# Patient Record
Sex: MALE | Race: WHITE | NOT HISPANIC OR LATINO | Employment: OTHER | URBAN - METROPOLITAN AREA
[De-identification: names, ages, dates, MRNs, and addresses within clinical notes are randomized per-mention and may not be internally consistent; named-entity substitution may affect disease eponyms.]

---

## 2017-03-15 ENCOUNTER — CONVERSION ENCOUNTER (OUTPATIENT)
Dept: CARDIOLOGY | Facility: CLINIC | Age: 77
End: 2017-03-15

## 2018-03-14 ENCOUNTER — HOSPITAL ENCOUNTER (OUTPATIENT)
Dept: CARDIOLOGY | Facility: HOSPITAL | Age: 78
Discharge: HOME | End: 2018-03-14
Attending: INTERNAL MEDICINE | Admitting: INTERNAL MEDICINE

## 2018-03-14 ENCOUNTER — CONVERSION ENCOUNTER (OUTPATIENT)
Dept: CARDIOLOGY | Facility: CLINIC | Age: 78
End: 2018-03-14

## 2018-11-07 ENCOUNTER — CONVERSION ENCOUNTER (OUTPATIENT)
Dept: CARDIOLOGY | Facility: CLINIC | Age: 78
End: 2018-11-07

## 2019-06-01 ENCOUNTER — TRANSCRIBE ORDERS (OUTPATIENT)
Dept: CARDIOLOGY | Facility: CLINIC | Age: 79
End: 2019-06-01

## 2019-06-01 DIAGNOSIS — Z86.79 PERSONAL HISTORY OF CARDIOVASCULAR DISORDER: ICD-10-CM

## 2019-06-01 DIAGNOSIS — I10 HYPERTENSION, UNSPECIFIED TYPE: ICD-10-CM

## 2019-06-01 DIAGNOSIS — IMO0001 DIABETES MELLITUS TYPE 2, UNCONTROLLED, WITHOUT COMPLICATIONS: ICD-10-CM

## 2019-06-01 DIAGNOSIS — I34.89: Primary | ICD-10-CM

## 2019-06-01 DIAGNOSIS — E13.9 DIABETES 1.5, MANAGED AS TYPE 1 (HCC): ICD-10-CM

## 2019-06-04 VITALS
OXYGEN SATURATION: 97 % | DIASTOLIC BLOOD PRESSURE: 82 MMHG | HEART RATE: 62 BPM | DIASTOLIC BLOOD PRESSURE: 87 MMHG | DIASTOLIC BLOOD PRESSURE: 78 MMHG | SYSTOLIC BLOOD PRESSURE: 149 MMHG | SYSTOLIC BLOOD PRESSURE: 156 MMHG | WEIGHT: 179.75 LBS | WEIGHT: 181 LBS | OXYGEN SATURATION: 98 % | HEART RATE: 60 BPM | SYSTOLIC BLOOD PRESSURE: 154 MMHG | HEART RATE: 65 BPM | OXYGEN SATURATION: 100 % | WEIGHT: 180.75 LBS

## 2019-07-08 PROBLEM — E11.9 TYPE 2 DIABETES MELLITUS (HCC): Status: ACTIVE | Noted: 2019-07-08

## 2019-07-08 RX ORDER — MULTIVITAMIN/IRON/FOLIC ACID 18MG-0.4MG
TABLET ORAL
COMMUNITY
Start: 2015-02-20

## 2019-07-08 RX ORDER — ASCORBIC ACID 500 MG
TABLET ORAL
COMMUNITY
Start: 2017-03-15

## 2019-07-08 RX ORDER — ASPIRIN 81 MG/1
TABLET ORAL
COMMUNITY
Start: 2015-02-20

## 2019-08-01 ENCOUNTER — OFFICE VISIT (OUTPATIENT)
Dept: CARDIOLOGY | Facility: CLINIC | Age: 79
End: 2019-08-01

## 2019-08-01 ENCOUNTER — HOSPITAL ENCOUNTER (OUTPATIENT)
Dept: CARDIOLOGY | Facility: HOSPITAL | Age: 79
Discharge: HOME | End: 2019-08-01
Admitting: INTERNAL MEDICINE

## 2019-08-01 VITALS
DIASTOLIC BLOOD PRESSURE: 80 MMHG | SYSTOLIC BLOOD PRESSURE: 147 MMHG | BODY MASS INDEX: 23.43 KG/M2 | WEIGHT: 173 LBS | HEIGHT: 72 IN

## 2019-08-01 VITALS
SYSTOLIC BLOOD PRESSURE: 138 MMHG | BODY MASS INDEX: 24.25 KG/M2 | OXYGEN SATURATION: 100 % | HEART RATE: 64 BPM | DIASTOLIC BLOOD PRESSURE: 89 MMHG | WEIGHT: 178.8 LBS

## 2019-08-01 DIAGNOSIS — IMO0001 DIABETES MELLITUS TYPE 2, UNCONTROLLED, WITHOUT COMPLICATIONS: ICD-10-CM

## 2019-08-01 DIAGNOSIS — I34.0 NON-RHEUMATIC MITRAL REGURGITATION: ICD-10-CM

## 2019-08-01 DIAGNOSIS — I34.89: ICD-10-CM

## 2019-08-01 DIAGNOSIS — I10 ESSENTIAL HYPERTENSION: Primary | ICD-10-CM

## 2019-08-01 DIAGNOSIS — I10 HYPERTENSION, UNSPECIFIED TYPE: ICD-10-CM

## 2019-08-01 DIAGNOSIS — E11.9 TYPE 2 DIABETES MELLITUS WITHOUT COMPLICATION, WITHOUT LONG-TERM CURRENT USE OF INSULIN (HCC): ICD-10-CM

## 2019-08-01 DIAGNOSIS — Z86.79 PERSONAL HISTORY OF CARDIOVASCULAR DISORDER: ICD-10-CM

## 2019-08-01 LAB
BH CV ECHO MEAS - ACS: 2.1 CM
BH CV ECHO MEAS - AO MAX PG (FULL): 2 MMHG
BH CV ECHO MEAS - AO MAX PG: 6.4 MMHG
BH CV ECHO MEAS - AO MEAN PG (FULL): 1.4 MMHG
BH CV ECHO MEAS - AO MEAN PG: 3.7 MMHG
BH CV ECHO MEAS - AO ROOT AREA: 7.9 CM^2
BH CV ECHO MEAS - AO ROOT DIAM: 3.2 CM
BH CV ECHO MEAS - AO V2 MAX: 126.7 CM/SEC
BH CV ECHO MEAS - AO V2 MEAN: 92.2 CM/SEC
BH CV ECHO MEAS - AO V2 VTI: 23.9 CM
BH CV ECHO MEAS - ASC AORTA: 3.3 CM
BH CV ECHO MEAS - AVA(I,A): 2.7 CM^2
BH CV ECHO MEAS - AVA(I,D): 2.7 CM^2
BH CV ECHO MEAS - AVA(V,A): 2.5 CM^2
BH CV ECHO MEAS - AVA(V,D): 2.5 CM^2
BH CV ECHO MEAS - EDV(CUBED): 93.4 ML
BH CV ECHO MEAS - EDV(MOD-SP4): 75.9 ML
BH CV ECHO MEAS - EDV(TEICH): 94.2 ML
BH CV ECHO MEAS - EF(CUBED): 79.3 %
BH CV ECHO MEAS - EF(MOD-SP4): 76.5 %
BH CV ECHO MEAS - EF(TEICH): 71.7 %
BH CV ECHO MEAS - ESV(CUBED): 19.3 ML
BH CV ECHO MEAS - ESV(MOD-SP4): 17.8 ML
BH CV ECHO MEAS - ESV(TEICH): 26.6 ML
BH CV ECHO MEAS - FS: 40.8 %
BH CV ECHO MEAS - IVS/LVPW: 1.1
BH CV ECHO MEAS - IVSD: 1.3 CM
BH CV ECHO MEAS - LA DIMENSION: 3.7 CM
BH CV ECHO MEAS - LA/AO: 1.2
BH CV ECHO MEAS - LV MASS(C)D: 212.1 GRAMS
BH CV ECHO MEAS - LV MAX PG: 4.4 MMHG
BH CV ECHO MEAS - LV MEAN PG: 2.3 MMHG
BH CV ECHO MEAS - LV V1 MAX: 104.9 CM/SEC
BH CV ECHO MEAS - LV V1 MEAN: 71.9 CM/SEC
BH CV ECHO MEAS - LV V1 VTI: 21.1 CM
BH CV ECHO MEAS - LVIDD: 4.5 CM
BH CV ECHO MEAS - LVIDS: 2.7 CM
BH CV ECHO MEAS - LVOT AREA: 3.1 CM^2
BH CV ECHO MEAS - LVOT DIAM: 2 CM
BH CV ECHO MEAS - LVPWD: 1.2 CM
BH CV ECHO MEAS - MV A MAX VEL: 88 CM/SEC
BH CV ECHO MEAS - MV DEC SLOPE: 322 CM/SEC^2
BH CV ECHO MEAS - MV DEC TIME: 0.3 SEC
BH CV ECHO MEAS - MV E MAX VEL: 96.9 CM/SEC
BH CV ECHO MEAS - MV E/A: 1.1
BH CV ECHO MEAS - MV MAX PG: 3.6 MMHG
BH CV ECHO MEAS - MV MEAN PG: 1.6 MMHG
BH CV ECHO MEAS - MV V2 MAX: 94.7 CM/SEC
BH CV ECHO MEAS - MV V2 MEAN: 58.2 CM/SEC
BH CV ECHO MEAS - MV V2 VTI: 29.8 CM
BH CV ECHO MEAS - MVA(VTI): 2.2 CM^2
BH CV ECHO MEAS - PA ACC TIME: 0.11 SEC
BH CV ECHO MEAS - PA PR(ACCEL): 30.7 MMHG
BH CV ECHO MEAS - RV MAX PG: 1.8 MMHG
BH CV ECHO MEAS - RV MEAN PG: 0.94 MMHG
BH CV ECHO MEAS - RV V1 MAX: 67.3 CM/SEC
BH CV ECHO MEAS - RV V1 MEAN: 45.9 CM/SEC
BH CV ECHO MEAS - RV V1 VTI: 15.1 CM
BH CV ECHO MEAS - RVDD: 2.6 CM
BH CV ECHO MEAS - SV(AO): 189.6 ML
BH CV ECHO MEAS - SV(CUBED): 74 ML
BH CV ECHO MEAS - SV(LVOT): 64.9 ML
BH CV ECHO MEAS - SV(MOD-SP4): 58.1 ML
BH CV ECHO MEAS - SV(TEICH): 67.6 ML
MAXIMAL PREDICTED HEART RATE: 144 BPM
STRESS TARGET HR: 122 BPM

## 2019-08-01 PROCEDURE — 93306 TTE W/DOPPLER COMPLETE: CPT | Performed by: INTERNAL MEDICINE

## 2019-08-01 PROCEDURE — 99213 OFFICE O/P EST LOW 20 MIN: CPT | Performed by: INTERNAL MEDICINE

## 2019-08-01 PROCEDURE — 93306 TTE W/DOPPLER COMPLETE: CPT

## 2019-08-01 PROCEDURE — 93000 ELECTROCARDIOGRAM COMPLETE: CPT | Performed by: INTERNAL MEDICINE

## 2019-08-01 NOTE — PROGRESS NOTES
Subjective:     Encounter Date:08/01/2019      Patient ID: Maurice Alvarado is a 78 y.o. male.    Chief Complaint:  History of Present Illness 78-year-old white male with history of mitral valve disorder with mitral regurgitation history of diabetes hypertension presents to my office for follow-up.  Patient is currently stable without any symptoms of chest pain or shortness of breath at rest on exertion.  No complaints of any PND orthopnea.  No palpitations dizziness syncope or swelling of the feet.  Patient has been taking all his medicines regularly.  He had an echocardiogram which showed normal LV function with moderate mitral regurgitation.  He does not smoke.  He is exercising regularly.    The following portions of the patient's history were reviewed and updated as appropriate: allergies, current medications, past family history, past medical history, past social history, past surgical history and problem list.  History reviewed. No pertinent past medical history.  History reviewed. No pertinent surgical history.  /89 (BP Location: Left arm, Patient Position: Sitting)   Pulse 64   Wt 81.1 kg (178 lb 12.8 oz)   SpO2 100%   BMI 24.25 kg/m²   History reviewed. No pertinent family history.    Current Outpatient Medications:   •  aspirin (ASPIR-LOW) 81 MG EC tablet, ASPIR-LOW 81 MG TBEC, Disp: , Rfl:   •  B Complex-Biotin-FA (SUPER B-COMPLEX) capsule, SUPER B-COMPLEX CAPS, Disp: , Rfl:   •  Coenzyme Q10 (COQ-10) 200 MG capsule, Take  by mouth., Disp: , Rfl:   •  Flaxseed, Linseed, (FLAXSEED OIL) oil, FLAX SEED OIL CAPS, Disp: , Rfl:   •  metFORMIN (GLUCOPHAGE) 500 MG tablet, METFORMIN  MG TABS, Disp: , Rfl:   •  Misc Natural Products (PROSTATE SUPPORT PO), Take  by mouth., Disp: , Rfl:   •  Multiple Vitamins-Minerals (EQ COMPLETE MULTIVITAMIN-ADULT) tablet, EQ COMPLETE MULTIVITAMIN-ADULT TABS, Disp: , Rfl:   •  vitamin C (ASCORBIC ACID) 500 MG tablet, VITAMIN C 500 MG TABS, Disp: , Rfl:   No  Known Allergies  Social History     Socioeconomic History   • Marital status:      Spouse name: Not on file   • Number of children: Not on file   • Years of education: Not on file   • Highest education level: Not on file   Tobacco Use   • Smoking status: Never Smoker   • Smokeless tobacco: Never Used   Substance and Sexual Activity   • Alcohol use: Yes     Review of Systems   Constitution: Negative for fever and malaise/fatigue.   Cardiovascular: Negative for chest pain, dyspnea on exertion and palpitations.   Respiratory: Negative for cough and shortness of breath.    Skin: Negative for rash.   Gastrointestinal: Negative for abdominal pain, nausea and vomiting.   Neurological: Negative for focal weakness and headaches.   All other systems reviewed and are negative.             Objective:     Physical Exam   Constitutional: He appears well-developed and well-nourished.   HENT:   Head: Normocephalic and atraumatic.   Eyes: Conjunctivae are normal. No scleral icterus.   Neck: Normal range of motion. Neck supple. No JVD present. Carotid bruit is not present.   Cardiovascular: Normal rate, regular rhythm, S1 normal, S2 normal, normal heart sounds and intact distal pulses. PMI is not displaced.   Pulmonary/Chest: Effort normal and breath sounds normal. He has no wheezes. He has no rales.   Abdominal: Soft. Bowel sounds are normal.   Neurological: He is alert. He has normal strength.   Skin: Skin is warm and dry. No rash noted.       ECG 12 Lead  Date/Time: 8/1/2019 10:30 AM  Performed by: Lenny Gilman MD  Authorized by: Lenny Gilman MD   Comments: Sinus rhythm with early repolarization  No previous EKGs to compare with            Lab Review:       Assessment:          Diagnosis Plan   1. Essential hypertension     2. Type 2 diabetes mellitus without complication, without long-term current use of insulin (CMS/[facility])     3. Non-rheumatic mitral regurgitation            Plan:       Patient has mitral valve disorder  with moderate mitral regurgitation with normal with function per  Patient's blood pressure and heart rate are stable.  Patient is on metformin for diabetes and his sugar levels are followed by the primary care doctor.

## 2020-05-28 ENCOUNTER — OFFICE VISIT (OUTPATIENT)
Dept: CARDIOLOGY | Facility: CLINIC | Age: 80
End: 2020-05-28

## 2020-05-28 ENCOUNTER — PATIENT MESSAGE (OUTPATIENT)
Dept: CARDIOLOGY | Facility: CLINIC | Age: 80
End: 2020-05-28

## 2020-05-28 DIAGNOSIS — I05.9 MITRAL VALVE DISEASE: Primary | ICD-10-CM

## 2020-05-28 DIAGNOSIS — E11.9 TYPE 2 DIABETES MELLITUS WITHOUT COMPLICATION, WITHOUT LONG-TERM CURRENT USE OF INSULIN (HCC): ICD-10-CM

## 2020-05-28 DIAGNOSIS — E78.00 PURE HYPERCHOLESTEROLEMIA: ICD-10-CM

## 2020-05-28 DIAGNOSIS — I10 ESSENTIAL HYPERTENSION: ICD-10-CM

## 2020-05-28 DIAGNOSIS — I34.0 NONRHEUMATIC MITRAL (VALVE) INSUFFICIENCY: ICD-10-CM

## 2020-05-28 PROCEDURE — 99214 OFFICE O/P EST MOD 30 MIN: CPT | Performed by: INTERNAL MEDICINE

## 2020-05-28 NOTE — PROGRESS NOTES
"    Subjective:     Encounter Date:05/28/2020      Patient ID: Maurice Alvarado is a 79 y.o. male.    Chief Complaint:  History of Present Illness 79-year-old white male with history of mitral valve disease hypertension hyperlipidemia and diabetes presents to my office for follow-up.  Patient is currently stable without dizziness of chest pain but has occasional shortness of breath with exertion.  No complains of any PND orthopnea.  No palpitation dizziness syncope or swelling of the feet.  He is taking his medicines regularly.  He is also very active.  He does not smoke.  He watches his diet.  He had an echocardiogram in the past which showed normal LV systolic function with moderate mitral regurgitation    The following portions of the patient's history were reviewed and updated as appropriate: allergies, current medications, past family history, past medical history, past social history, past surgical history and problem list.  History reviewed. No pertinent past medical history.  History reviewed. No pertinent surgical history.  /93 (BP Location: Left arm, Patient Position: Sitting)   Pulse 68   Ht 182.9 cm (72\")   Wt 82.1 kg (181 lb)   SpO2 100%   BMI 24.55 kg/m²   History reviewed. No pertinent family history.    Current Outpatient Medications:   •  aspirin (ASPIR-LOW) 81 MG EC tablet, ASPIR-LOW 81 MG TBEC, Disp: , Rfl:   •  B Complex-Biotin-FA (SUPER B-COMPLEX) capsule, SUPER B-COMPLEX CAPS, Disp: , Rfl:   •  Coenzyme Q10 (COQ-10) 200 MG capsule, Take  by mouth., Disp: , Rfl:   •  Flaxseed, Linseed, (FLAXSEED OIL) oil, FLAX SEED OIL CAPS, Disp: , Rfl:   •  metFORMIN (GLUCOPHAGE) 500 MG tablet, METFORMIN  MG TABS, Disp: , Rfl:   •  Misc Natural Products (PROSTATE SUPPORT PO), Take  by mouth., Disp: , Rfl:   •  Multiple Vitamins-Minerals (EQ COMPLETE MULTIVITAMIN-ADULT) tablet, EQ COMPLETE MULTIVITAMIN-ADULT TABS, Disp: , Rfl:   •  vitamin C (ASCORBIC ACID) 500 MG tablet, VITAMIN C 500 MG TABS, " Disp: , Rfl:   No Known Allergies  Social History     Socioeconomic History   • Marital status:      Spouse name: Not on file   • Number of children: Not on file   • Years of education: Not on file   • Highest education level: Not on file   Tobacco Use   • Smoking status: Never Smoker   • Smokeless tobacco: Never Used   Substance and Sexual Activity   • Alcohol use: Yes     Review of Systems   Constitution: Negative for fever and malaise/fatigue.   HENT: Negative for ear pain and nosebleeds.    Eyes: Negative for blurred vision and double vision.   Cardiovascular: Negative for chest pain, dyspnea on exertion and palpitations.   Respiratory: Positive for shortness of breath. Negative for cough.    Skin: Negative for rash.   Musculoskeletal: Negative for joint pain.   Gastrointestinal: Negative for abdominal pain, nausea and vomiting.   Neurological: Negative for focal weakness and headaches.   Psychiatric/Behavioral: Negative for depression. The patient is not nervous/anxious.    All other systems reviewed and are negative.             Objective:     Physical Exam   Constitutional: He appears well-developed and well-nourished.   HENT:   Head: Normocephalic and atraumatic.   Eyes: Pupils are equal, round, and reactive to light. Conjunctivae and EOM are normal. No scleral icterus.   Neck: Normal range of motion. Neck supple. No JVD present. Carotid bruit is not present.   Cardiovascular: Normal rate, regular rhythm, S1 normal, S2 normal and intact distal pulses. PMI is not displaced.   Murmur heard.  Pulmonary/Chest: Effort normal and breath sounds normal. He has no wheezes. He has no rales.   Abdominal: Soft. Bowel sounds are normal.   Musculoskeletal: Normal range of motion.   Neurological: He is alert. He has normal strength.   No focal deficits   Skin: Skin is warm and dry. No rash noted.   Psychiatric: He has a normal mood and affect.     Procedures    Lab Review:       Assessment:          Diagnosis Plan    1. Mitral valve disease     2. Essential hypertension     3. Type 2 diabetes mellitus without complication, without long-term current use of insulin (CMS/[facility])     4. Pure hypercholesterolemia            Plan:       Patient has history of moderate mitral regurgitation with normal systolic function has some shortness of breath on occasions.  Patient will have an echocardiogram at his next visit  Patient's blood pressure was slightly high initially but later it was better and is not on any medicines but will place him on medical therapy if his blood pressure is consistently high  Patient lipid levels are followed by the primary care doctor  Patient has diabetes and is on medical therapy and hemoglobin A1c is around 7.0  Discussed with patient about watching his diet and exercising regularly.  We will follow him in 6 months with an echocardiogram.

## 2020-05-29 VITALS
SYSTOLIC BLOOD PRESSURE: 134 MMHG | DIASTOLIC BLOOD PRESSURE: 84 MMHG | BODY MASS INDEX: 24.52 KG/M2 | HEIGHT: 72 IN | OXYGEN SATURATION: 100 % | HEART RATE: 68 BPM | WEIGHT: 181 LBS

## 2020-12-10 ENCOUNTER — HOSPITAL ENCOUNTER (OUTPATIENT)
Dept: CARDIOLOGY | Facility: HOSPITAL | Age: 80
Discharge: HOME | End: 2020-12-10
Admitting: INTERNAL MEDICINE

## 2020-12-10 ENCOUNTER — OFFICE VISIT (OUTPATIENT)
Dept: CARDIOLOGY | Facility: CLINIC | Age: 80
End: 2020-12-10

## 2020-12-10 VITALS
SYSTOLIC BLOOD PRESSURE: 130 MMHG | BODY MASS INDEX: 23.57 KG/M2 | HEIGHT: 72 IN | DIASTOLIC BLOOD PRESSURE: 59 MMHG | WEIGHT: 174 LBS

## 2020-12-10 VITALS
DIASTOLIC BLOOD PRESSURE: 88 MMHG | HEART RATE: 57 BPM | TEMPERATURE: 97.3 F | SYSTOLIC BLOOD PRESSURE: 138 MMHG | BODY MASS INDEX: 24.11 KG/M2 | HEIGHT: 72 IN | OXYGEN SATURATION: 100 % | WEIGHT: 178 LBS

## 2020-12-10 DIAGNOSIS — I34.0 NONRHEUMATIC MITRAL (VALVE) INSUFFICIENCY: ICD-10-CM

## 2020-12-10 DIAGNOSIS — I34.0 NONRHEUMATIC MITRAL (VALVE) INSUFFICIENCY: Primary | ICD-10-CM

## 2020-12-10 DIAGNOSIS — I05.9 MITRAL VALVE DISEASE: ICD-10-CM

## 2020-12-10 DIAGNOSIS — E78.00 PURE HYPERCHOLESTEROLEMIA: ICD-10-CM

## 2020-12-10 DIAGNOSIS — E11.9 TYPE 2 DIABETES MELLITUS WITHOUT COMPLICATION, WITHOUT LONG-TERM CURRENT USE OF INSULIN (HCC): ICD-10-CM

## 2020-12-10 DIAGNOSIS — I10 ESSENTIAL HYPERTENSION: ICD-10-CM

## 2020-12-10 LAB
BH CV ECHO MEAS - ACS: 1.8 CM
BH CV ECHO MEAS - AO MAX PG (FULL): 4.8 MMHG
BH CV ECHO MEAS - AO MAX PG: 9 MMHG
BH CV ECHO MEAS - AO MEAN PG (FULL): 2.3 MMHG
BH CV ECHO MEAS - AO MEAN PG: 4.2 MMHG
BH CV ECHO MEAS - AO ROOT AREA (BSA CORRECTED): 1.5
BH CV ECHO MEAS - AO ROOT AREA: 6.9 CM^2
BH CV ECHO MEAS - AO ROOT DIAM: 3 CM
BH CV ECHO MEAS - AO V2 MAX: 149.6 CM/SEC
BH CV ECHO MEAS - AO V2 MEAN: 95.6 CM/SEC
BH CV ECHO MEAS - AO V2 VTI: 30.4 CM
BH CV ECHO MEAS - AVA(I,A): 2.3 CM^2
BH CV ECHO MEAS - AVA(I,D): 2.3 CM^2
BH CV ECHO MEAS - AVA(V,A): 2.2 CM^2
BH CV ECHO MEAS - AVA(V,D): 2.2 CM^2
BH CV ECHO MEAS - BSA(HAYCOCK): 2 M^2
BH CV ECHO MEAS - BSA: 2 M^2
BH CV ECHO MEAS - BZI_BMI: 23.6 KILOGRAMS/M^2
BH CV ECHO MEAS - BZI_METRIC_HEIGHT: 182.9 CM
BH CV ECHO MEAS - BZI_METRIC_WEIGHT: 78.9 KG
BH CV ECHO MEAS - EDV(CUBED): 79.4 ML
BH CV ECHO MEAS - EDV(MOD-SP2): 89.1 ML
BH CV ECHO MEAS - EDV(MOD-SP4): 89.5 ML
BH CV ECHO MEAS - EDV(TEICH): 82.9 ML
BH CV ECHO MEAS - EF(CUBED): 47.8 %
BH CV ECHO MEAS - EF(MOD-SP2): 60.5 %
BH CV ECHO MEAS - EF(MOD-SP4): 60.7 %
BH CV ECHO MEAS - EF(TEICH): 40.3 %
BH CV ECHO MEAS - ESV(CUBED): 41.4 ML
BH CV ECHO MEAS - ESV(MOD-SP2): 35.2 ML
BH CV ECHO MEAS - ESV(MOD-SP4): 35.1 ML
BH CV ECHO MEAS - ESV(TEICH): 49.5 ML
BH CV ECHO MEAS - FS: 19.5 %
BH CV ECHO MEAS - IVS/LVPW: 1.2
BH CV ECHO MEAS - IVSD: 1.2 CM
BH CV ECHO MEAS - LA DIMENSION: 3 CM
BH CV ECHO MEAS - LA/AO: 1
BH CV ECHO MEAS - LV DIASTOLIC VOL/BSA (35-75): 44.6 ML/M^2
BH CV ECHO MEAS - LV MASS(C)D: 155 GRAMS
BH CV ECHO MEAS - LV MASS(C)DI: 77.2 GRAMS/M^2
BH CV ECHO MEAS - LV MAX PG: 4.2 MMHG
BH CV ECHO MEAS - LV MEAN PG: 2 MMHG
BH CV ECHO MEAS - LV SYSTOLIC VOL/BSA (12-30): 17.5 ML/M^2
BH CV ECHO MEAS - LV V1 MAX: 102.5 CM/SEC
BH CV ECHO MEAS - LV V1 MEAN: 65.7 CM/SEC
BH CV ECHO MEAS - LV V1 VTI: 22.5 CM
BH CV ECHO MEAS - LVIDD: 4.3 CM
BH CV ECHO MEAS - LVIDS: 3.5 CM
BH CV ECHO MEAS - LVOT AREA: 3.1 CM^2
BH CV ECHO MEAS - LVOT DIAM: 2 CM
BH CV ECHO MEAS - LVPWD: 0.95 CM
BH CV ECHO MEAS - MV A MAX VEL: 101.5 CM/SEC
BH CV ECHO MEAS - MV DEC SLOPE: 324.7 CM/SEC^2
BH CV ECHO MEAS - MV DEC TIME: 0.32 SEC
BH CV ECHO MEAS - MV E MAX VEL: 103.6 CM/SEC
BH CV ECHO MEAS - MV E/A: 1
BH CV ECHO MEAS - MV MAX PG: 5.1 MMHG
BH CV ECHO MEAS - MV MEAN PG: 2.2 MMHG
BH CV ECHO MEAS - MV V2 MAX: 112.4 CM/SEC
BH CV ECHO MEAS - MV V2 MEAN: 70.6 CM/SEC
BH CV ECHO MEAS - MV V2 VTI: 41.1 CM
BH CV ECHO MEAS - MVA(VTI): 1.7 CM^2
BH CV ECHO MEAS - PA ACC TIME: 0.08 SEC
BH CV ECHO MEAS - PA PR(ACCEL): 43.2 MMHG
BH CV ECHO MEAS - PULM A REVS DUR: 0.12 SEC
BH CV ECHO MEAS - PULM A REVS VEL: 31.2 CM/SEC
BH CV ECHO MEAS - PULM DIAS VEL: 51.9 CM/SEC
BH CV ECHO MEAS - PULM S/D: 1.3
BH CV ECHO MEAS - PULM SYS VEL: 66.7 CM/SEC
BH CV ECHO MEAS - RAP SYSTOLE: 3 MMHG
BH CV ECHO MEAS - RV MAX PG: 1.6 MMHG
BH CV ECHO MEAS - RV MEAN PG: 0.81 MMHG
BH CV ECHO MEAS - RV V1 MAX: 63 CM/SEC
BH CV ECHO MEAS - RV V1 MEAN: 42.5 CM/SEC
BH CV ECHO MEAS - RV V1 VTI: 14 CM
BH CV ECHO MEAS - RVDD: 2.4 CM
BH CV ECHO MEAS - RVSP: 26.3 MMHG
BH CV ECHO MEAS - SI(AO): 105 ML/M^2
BH CV ECHO MEAS - SI(CUBED): 18.9 ML/M^2
BH CV ECHO MEAS - SI(LVOT): 35.2 ML/M^2
BH CV ECHO MEAS - SI(MOD-SP2): 26.8 ML/M^2
BH CV ECHO MEAS - SI(MOD-SP4): 27.1 ML/M^2
BH CV ECHO MEAS - SI(TEICH): 16.7 ML/M^2
BH CV ECHO MEAS - SV(AO): 210.9 ML
BH CV ECHO MEAS - SV(CUBED): 37.9 ML
BH CV ECHO MEAS - SV(LVOT): 70.7 ML
BH CV ECHO MEAS - SV(MOD-SP2): 53.9 ML
BH CV ECHO MEAS - SV(MOD-SP4): 54.4 ML
BH CV ECHO MEAS - SV(TEICH): 33.4 ML
BH CV ECHO MEAS - TR MAX VEL: 241.2 CM/SEC
MAXIMAL PREDICTED HEART RATE: 142 BPM
STRESS TARGET HR: 121 BPM

## 2020-12-10 PROCEDURE — 93306 TTE W/DOPPLER COMPLETE: CPT | Performed by: INTERNAL MEDICINE

## 2020-12-10 PROCEDURE — 99214 OFFICE O/P EST MOD 30 MIN: CPT | Performed by: INTERNAL MEDICINE

## 2020-12-10 PROCEDURE — 93306 TTE W/DOPPLER COMPLETE: CPT

## 2020-12-10 NOTE — PROGRESS NOTES
"    Subjective:     Encounter Date:12/10/2020      Patient ID: Maurice Alvarado is a 80 y.o. male.    Chief Complaint:  History of Present Illness 80-year-old white male with history of mitral valve regurgitation hypertension hyperlipidemia diabetes presents to my office for follow-up.  Patient is currently stable without any symptoms of chest pain or shortness of breath at rest or exertion.  No complains of any PND orthopnea.  No palpitation dizziness syncope or swelling of the feet.  Patient has been taking all the medicines regularly.  Patient does not smoke.  Patient is trying to exercise regularly follows a good diet.  /88   Pulse 57   Temp 97.3 °F (36.3 °C)   Ht 182.9 cm (72\")   Wt 80.7 kg (178 lb)   SpO2 100%   BMI 24.14 kg/m²     The following portions of the patient's history were reviewed and updated as appropriate: allergies, current medications, past family history, past medical history, past social history, past surgical history and problem list.  Past Medical History:   Diagnosis Date   • Diabetes mellitus (CMS/HCC)    • Heart valve disease    • Hypertension      Past Surgical History:   Procedure Laterality Date   • GALLBLADDER SURGERY  2002     Social History     Socioeconomic History   • Marital status:      Spouse name: Not on file   • Number of children: Not on file   • Years of education: Not on file   • Highest education level: Not on file   Tobacco Use   • Smoking status: Never Smoker   • Smokeless tobacco: Never Used   Substance and Sexual Activity   • Alcohol use: Yes     Comment: occ    • Drug use: Never     History reviewed. No pertinent family history.    Current Outpatient Medications:   •  aspirin (ASPIR-LOW) 81 MG EC tablet, ASPIR-LOW 81 MG TBEC, Disp: , Rfl:   •  B Complex-Biotin-FA (SUPER B-COMPLEX) capsule, SUPER B-COMPLEX CAPS, Disp: , Rfl:   •  Coenzyme Q10 (COQ-10) 200 MG capsule, Take  by mouth., Disp: , Rfl:   •  Flaxseed, Linseed, (FLAXSEED OIL) oil, FLAX SEED " OIL CAPS, Disp: , Rfl:   •  metFORMIN (GLUCOPHAGE) 500 MG tablet, Take 500 mg by mouth 2 (Two) Times a Day With Meals., Disp: , Rfl:   •  Misc Natural Products (PROSTATE SUPPORT PO), Take  by mouth., Disp: , Rfl:   •  Multiple Vitamins-Minerals (EQ COMPLETE MULTIVITAMIN-ADULT) tablet, EQ COMPLETE MULTIVITAMIN-ADULT TABS, Disp: , Rfl:   •  vitamin C (ASCORBIC ACID) 500 MG tablet, VITAMIN C 500 MG TABS, Disp: , Rfl:   No Known Allergies    Review of Systems   Constitution: Positive for malaise/fatigue (not excessive). Negative for fever.   HENT: Negative for congestion and hearing loss.    Eyes: Negative for double vision and visual disturbance.   Cardiovascular: Negative for chest pain, claudication, dyspnea on exertion, leg swelling and syncope.   Respiratory: Negative for cough and shortness of breath.    Endocrine: Negative for cold intolerance.   Skin: Negative for color change and rash.   Musculoskeletal: Negative for arthritis and joint pain.   Gastrointestinal: Negative for abdominal pain and heartburn.   Genitourinary: Negative for hematuria.   Neurological: Negative for excessive daytime sleepiness and dizziness.   Psychiatric/Behavioral: Negative for depression. The patient is not nervous/anxious.    All other systems reviewed and are negative.             Objective:     Constitutional:       Appearance: Well-developed.   Eyes:      General: No scleral icterus.     Conjunctiva/sclera: Conjunctivae normal.      Pupils: Pupils are equal, round, and reactive to light.   HENT:      Head: Normocephalic and atraumatic.   Neck:      Musculoskeletal: Normal range of motion and neck supple.      Vascular: No carotid bruit or JVD.   Pulmonary:      Effort: Pulmonary effort is normal.      Breath sounds: Normal breath sounds. No wheezing. No rales.   Cardiovascular:      Normal rate. Regular rhythm.      Murmurs: There is a systolic murmur.   Pulses:     Intact distal pulses.   Abdominal:      General: Bowel sounds  are normal.      Palpations: Abdomen is soft.   Musculoskeletal: Normal range of motion.   Skin:     General: Skin is warm and dry.      Findings: No rash.   Neurological:      Mental Status: Alert.      Comments: No focal deficits         Procedures    Lab Review:       Assessment:          Diagnosis Plan   1. Nonrheumatic mitral (valve) insufficiency      2. Mitral valve disease     3. Essential hypertension     4. Type 2 diabetes mellitus without complication, without long-term current use of insulin (CMS/[facility])     5. Pure hypercholesterolemia            Plan:       Patient has history of mitral valve regurgitation with moderate mitral regurgitation normal LV function  Patient blood pressure and heart rate are stable  Patient lipids are followed by the primary care doctor  Patient has diabetes and is on oral medicines  Continue current medicines and follow-up in 6 months.

## 2021-07-15 ENCOUNTER — OFFICE VISIT (OUTPATIENT)
Dept: CARDIOLOGY | Facility: CLINIC | Age: 81
End: 2021-07-15

## 2021-07-15 VITALS
HEART RATE: 57 BPM | BODY MASS INDEX: 24.38 KG/M2 | SYSTOLIC BLOOD PRESSURE: 165 MMHG | DIASTOLIC BLOOD PRESSURE: 88 MMHG | HEIGHT: 72 IN | WEIGHT: 180 LBS | OXYGEN SATURATION: 99 %

## 2021-07-15 DIAGNOSIS — I05.9 MITRAL VALVE DISEASE: Primary | ICD-10-CM

## 2021-07-15 DIAGNOSIS — E11.9 TYPE 2 DIABETES MELLITUS WITHOUT COMPLICATION, WITHOUT LONG-TERM CURRENT USE OF INSULIN (HCC): ICD-10-CM

## 2021-07-15 DIAGNOSIS — E78.00 PURE HYPERCHOLESTEROLEMIA: ICD-10-CM

## 2021-07-15 DIAGNOSIS — I10 ESSENTIAL HYPERTENSION: ICD-10-CM

## 2021-07-15 PROCEDURE — 99214 OFFICE O/P EST MOD 30 MIN: CPT | Performed by: INTERNAL MEDICINE

## 2021-07-15 RX ORDER — TRIAMCINOLONE ACETONIDE 1 MG/G
CREAM TOPICAL 2 TIMES DAILY
COMMUNITY
End: 2022-02-17

## 2021-07-15 NOTE — PROGRESS NOTES
"    Subjective:     Encounter Date:07/15/2021      Patient ID: Maurice Alvarado is a 80 y.o. male.    Chief Complaint:  History of Present Illness 80-year-old white male with history of mitral valve disorder history of hypertension diabetes hyperlipidemia presents to my office for follow-up.  Patient is currently stable without any symptoms of chest pain or shortness of breath at rest on exertion.  No complains of any PND orthopnea.  No palpitation dizziness syncope or swelling of the feet.  Patient has been taking all the medicines regularly.  Patient does not smoke.  Patient is trying to exercise regularly.  Follows a good diet.    The following portions of the patient's history were reviewed and updated as appropriate: allergies, current medications, past family history, past medical history, past social history, past surgical history and problem list.  Past Medical History:   Diagnosis Date   • Diabetes mellitus (CMS/HCC)    • Heart valve disease    • Hypertension      Past Surgical History:   Procedure Laterality Date   • GALLBLADDER SURGERY  2002     /88 (BP Location: Left arm, Patient Position: Sitting)   Pulse 57   Ht 182.9 cm (72\")   Wt 81.6 kg (180 lb)   SpO2 99%   BMI 24.41 kg/m²   History reviewed. No pertinent family history.    Current Outpatient Medications:   •  aspirin (ASPIR-LOW) 81 MG EC tablet, ASPIR-LOW 81 MG TBEC, Disp: , Rfl:   •  B Complex-Biotin-FA (SUPER B-COMPLEX) capsule, SUPER B-COMPLEX CAPS, Disp: , Rfl:   •  Coenzyme Q10 (COQ-10) 200 MG capsule, Take  by mouth., Disp: , Rfl:   •  Flaxseed, Linseed, (FLAXSEED OIL) oil, FLAX SEED OIL CAPS, Disp: , Rfl:   •  metFORMIN (GLUCOPHAGE) 500 MG tablet, Take 500 mg by mouth 2 (Two) Times a Day With Meals., Disp: , Rfl:   •  Misc Natural Products (PROSTATE SUPPORT PO), Take  by mouth., Disp: , Rfl:   •  Multiple Vitamins-Minerals (EQ COMPLETE MULTIVITAMIN-ADULT) tablet, EQ COMPLETE MULTIVITAMIN-ADULT TABS, Disp: , Rfl:   •  triamcinolone " (KENALOG) 0.1 % cream, Apply  topically to the appropriate area as directed 2 (Two) Times a Day., Disp: , Rfl:   •  vitamin C (ASCORBIC ACID) 500 MG tablet, VITAMIN C 500 MG TABS, Disp: , Rfl:   No Known Allergies  Social History     Socioeconomic History   • Marital status:      Spouse name: Not on file   • Number of children: Not on file   • Years of education: Not on file   • Highest education level: Not on file   Tobacco Use   • Smoking status: Never Smoker   • Smokeless tobacco: Never Used   Substance and Sexual Activity   • Alcohol use: Yes     Comment: occ    • Drug use: Never     Review of Systems   Constitutional: Positive for malaise/fatigue. Negative for fever.   Cardiovascular: Negative for chest pain, dyspnea on exertion and palpitations.   Respiratory: Negative for cough and shortness of breath.    Skin: Negative for rash.   Gastrointestinal: Negative for abdominal pain, nausea and vomiting.   Neurological: Negative for focal weakness and headaches.   All other systems reviewed and are negative.             Objective:     Constitutional:       Appearance: Well-developed.   Eyes:      General: No scleral icterus.     Conjunctiva/sclera: Conjunctivae normal.   HENT:      Head: Normocephalic and atraumatic.   Neck:      Vascular: No carotid bruit or JVD.   Pulmonary:      Effort: Pulmonary effort is normal.      Breath sounds: Normal breath sounds. No wheezing. No rales.   Cardiovascular:      Normal rate. Regular rhythm.      Murmurs: There is a systolic murmur.   Pulses:     Intact distal pulses.   Abdominal:      General: Bowel sounds are normal.      Palpations: Abdomen is soft.   Musculoskeletal:      Cervical back: Normal range of motion and neck supple. Skin:     General: Skin is warm and dry.      Findings: No rash.   Neurological:      Mental Status: Alert.       Procedures    Lab Review:         MDM  1.  Mitral valve disease  Patient has moderate mitral regurgitation with normal V  function is currently stable  2.  Hypertension   t patient blood pressure is currently stable on medical therapy  3.  Hyperlipidemia  Patient's lipid levels are followed by the primary care doctor  4.  Diabetes  Patient is on oral medicines and followed by the primary care doctor.

## 2022-02-17 ENCOUNTER — OFFICE VISIT (OUTPATIENT)
Dept: CARDIOLOGY | Facility: CLINIC | Age: 82
End: 2022-02-17

## 2022-02-17 VITALS
BODY MASS INDEX: 24.52 KG/M2 | WEIGHT: 181 LBS | SYSTOLIC BLOOD PRESSURE: 161 MMHG | OXYGEN SATURATION: 100 % | HEART RATE: 55 BPM | DIASTOLIC BLOOD PRESSURE: 97 MMHG | HEIGHT: 72 IN

## 2022-02-17 DIAGNOSIS — E11.9 TYPE 2 DIABETES MELLITUS WITHOUT COMPLICATION, WITHOUT LONG-TERM CURRENT USE OF INSULIN: ICD-10-CM

## 2022-02-17 DIAGNOSIS — E78.00 PURE HYPERCHOLESTEROLEMIA: ICD-10-CM

## 2022-02-17 DIAGNOSIS — I05.9 MITRAL VALVE DISEASE: Primary | ICD-10-CM

## 2022-02-17 DIAGNOSIS — I10 ESSENTIAL HYPERTENSION: ICD-10-CM

## 2022-02-17 DIAGNOSIS — I34.0 NONRHEUMATIC MITRAL (VALVE) INSUFFICIENCY: ICD-10-CM

## 2022-02-17 PROCEDURE — 99213 OFFICE O/P EST LOW 20 MIN: CPT | Performed by: INTERNAL MEDICINE

## 2022-02-17 NOTE — PROGRESS NOTES
"    Subjective:     Encounter Date:02/17/2022      Patient ID: Maurice Alvarado is a 81 y.o. male.    Chief Complaint:  History of Present Illness 81-year-old white male with history of mitral valve disease history of hypertension diabetes hyperlipidemia presents to my office for follow-up.  Patient is currently stable without chest pain or shortness of breath at rest on exertion.  No palpitation dizziness syncope or swelling of the feet.  Patient has been taking all meds regular.  Patient does not smoke  The following portions of the patient's history were reviewed and updated as appropriate: allergies, current medications, past family history, past medical history, past social history, past surgical history and problem list.  Past Medical History:   Diagnosis Date   • Diabetes mellitus (HCC)    • Heart valve disease    • Hypertension      Past Surgical History:   Procedure Laterality Date   • GALLBLADDER SURGERY  2002     /97 (BP Location: Left arm, Patient Position: Sitting)   Pulse 55   Ht 182.9 cm (72\")   Wt 82.1 kg (181 lb)   SpO2 100%   BMI 24.55 kg/m²   History reviewed. No pertinent family history.    Current Outpatient Medications:   •  aspirin (ASPIR-LOW) 81 MG EC tablet, ASPIR-LOW 81 MG TBEC, Disp: , Rfl:   •  B Complex-Biotin-FA (SUPER B-COMPLEX) capsule, SUPER B-COMPLEX CAPS, Disp: , Rfl:   •  Coenzyme Q10 (COQ-10) 200 MG capsule, Take  by mouth., Disp: , Rfl:   •  Flaxseed, Linseed, (FLAXSEED OIL) oil, FLAX SEED OIL CAPS, Disp: , Rfl:   •  metFORMIN (GLUCOPHAGE) 500 MG tablet, Take 500 mg by mouth 2 (Two) Times a Day With Meals., Disp: , Rfl:   •  Misc Natural Products (PROSTATE SUPPORT PO), Take  by mouth., Disp: , Rfl:   •  Multiple Vitamins-Minerals (EQ COMPLETE MULTIVITAMIN-ADULT) tablet, EQ COMPLETE MULTIVITAMIN-ADULT TABS, Disp: , Rfl:   •  vitamin C (ASCORBIC ACID) 500 MG tablet, VITAMIN C 500 MG TABS, Disp: , Rfl:   No Known Allergies  Social History     Socioeconomic History   • " Marital status:    Tobacco Use   • Smoking status: Never Smoker   • Smokeless tobacco: Never Used   Substance and Sexual Activity   • Alcohol use: Yes     Comment: occ    • Drug use: Never     Review of Systems   Constitutional: Negative for fever and malaise/fatigue.   Cardiovascular: Negative for chest pain, dyspnea on exertion and palpitations.   Respiratory: Negative for cough and shortness of breath.    Skin: Negative for rash.   Gastrointestinal: Negative for abdominal pain, nausea and vomiting.   Neurological: Negative for focal weakness and headaches.   All other systems reviewed and are negative.             Objective:     Constitutional:       Appearance: Well-developed.   Eyes:      General: No scleral icterus.     Conjunctiva/sclera: Conjunctivae normal.   HENT:      Head: Normocephalic and atraumatic.   Neck:      Vascular: No carotid bruit or JVD.   Pulmonary:      Effort: Pulmonary effort is normal.      Breath sounds: Normal breath sounds. No wheezing. No rales.   Cardiovascular:      Normal rate. Regular rhythm.      Murmurs: There is a systolic murmur.   Pulses:     Intact distal pulses.   Abdominal:      General: Bowel sounds are normal.      Palpations: Abdomen is soft.   Musculoskeletal:      Cervical back: Normal range of motion and neck supple. Skin:     General: Skin is warm and dry.      Findings: No rash.   Neurological:      Mental Status: Alert.       Procedures    Lab Review:         MDM  01 mitral disease  Patient has mitral valve regurgitation and has a loud murmur and hence I'll get an echocardiogram  #2.  Hypertension excellent patient blood pressure is slightly high but is not taking any medicines and I will add ACE inhibitor's if needed  #3.  Diabetes  Patient is on oral medicines and followed by primary care doctor    #4 hyperlipidemia  Patient is on over-the-counter medicines and followed by the primary care doctor      Patient's previous medical records, labs, and EKG  were reviewed and discussed with the patient at today's visit.

## 2022-02-18 ENCOUNTER — TELEPHONE (OUTPATIENT)
Dept: CARDIOLOGY | Facility: CLINIC | Age: 82
End: 2022-02-18

## 2022-02-18 NOTE — TELEPHONE ENCOUNTER
Patient came in to the office today, asking his office visit from yesterday to be corrected. He is asking his dictation to state he is without chest pain.

## 2022-09-29 ENCOUNTER — HOSPITAL ENCOUNTER (OUTPATIENT)
Dept: CARDIOLOGY | Facility: HOSPITAL | Age: 82
Discharge: HOME | End: 2022-09-29
Admitting: INTERNAL MEDICINE

## 2022-09-29 ENCOUNTER — OFFICE VISIT (OUTPATIENT)
Dept: CARDIOLOGY | Facility: CLINIC | Age: 82
End: 2022-09-29

## 2022-09-29 VITALS
DIASTOLIC BLOOD PRESSURE: 81 MMHG | SYSTOLIC BLOOD PRESSURE: 157 MMHG | WEIGHT: 178 LBS | OXYGEN SATURATION: 99 % | HEART RATE: 59 BPM | HEIGHT: 72 IN | BODY MASS INDEX: 24.11 KG/M2

## 2022-09-29 VITALS — WEIGHT: 181 LBS | HEIGHT: 72 IN | BODY MASS INDEX: 24.52 KG/M2

## 2022-09-29 DIAGNOSIS — E78.00 PURE HYPERCHOLESTEROLEMIA: ICD-10-CM

## 2022-09-29 DIAGNOSIS — I05.9 MITRAL VALVE DISEASE: Primary | ICD-10-CM

## 2022-09-29 DIAGNOSIS — I34.0 NONRHEUMATIC MITRAL (VALVE) INSUFFICIENCY: ICD-10-CM

## 2022-09-29 DIAGNOSIS — E11.9 TYPE 2 DIABETES MELLITUS WITHOUT COMPLICATION, WITHOUT LONG-TERM CURRENT USE OF INSULIN: ICD-10-CM

## 2022-09-29 DIAGNOSIS — I10 ESSENTIAL HYPERTENSION: ICD-10-CM

## 2022-09-29 DIAGNOSIS — I05.9 MITRAL VALVE DISEASE: ICD-10-CM

## 2022-09-29 LAB
BH CV ECHO MEAS - AO MAX PG: 4.3 MMHG
BH CV ECHO MEAS - AO MEAN PG: 2.44 MMHG
BH CV ECHO MEAS - AO ROOT DIAM: 2.9 CM
BH CV ECHO MEAS - AO V2 MAX: 103.4 CM/SEC
BH CV ECHO MEAS - AO V2 VTI: 20.3 CM
BH CV ECHO MEAS - AVA(I,D): 2.41 CM2
BH CV ECHO MEAS - EDV(CUBED): 86.5 ML
BH CV ECHO MEAS - EDV(MOD-SP4): 84.7 ML
BH CV ECHO MEAS - EF(MOD-SP4): 74.7 %
BH CV ECHO MEAS - ESV(CUBED): 19.9 ML
BH CV ECHO MEAS - ESV(MOD-SP4): 21.4 ML
BH CV ECHO MEAS - FS: 38.7 %
BH CV ECHO MEAS - IVS/LVPW: 1.09 CM
BH CV ECHO MEAS - IVSD: 1.13 CM
BH CV ECHO MEAS - LA DIMENSION: 2.46 CM
BH CV ECHO MEAS - LV DIASTOLIC VOL/BSA (35-75): 41.7 CM2
BH CV ECHO MEAS - LV MASS(C)D: 167.1 GRAMS
BH CV ECHO MEAS - LV MAX PG: 3.8 MMHG
BH CV ECHO MEAS - LV MEAN PG: 1.94 MMHG
BH CV ECHO MEAS - LV SYSTOLIC VOL/BSA (12-30): 10.5 CM2
BH CV ECHO MEAS - LV V1 MAX: 97 CM/SEC
BH CV ECHO MEAS - LV V1 VTI: 16.8 CM
BH CV ECHO MEAS - LVIDD: 4.4 CM
BH CV ECHO MEAS - LVIDS: 2.7 CM
BH CV ECHO MEAS - LVOT AREA: 2.9 CM2
BH CV ECHO MEAS - LVOT DIAM: 1.93 CM
BH CV ECHO MEAS - LVPWD: 1.04 CM
BH CV ECHO MEAS - MV A MAX VEL: 68.6 CM/SEC
BH CV ECHO MEAS - MV DEC SLOPE: 389.5 CM/SEC2
BH CV ECHO MEAS - MV DEC TIME: 0.25 MSEC
BH CV ECHO MEAS - MV E MAX VEL: 97.1 CM/SEC
BH CV ECHO MEAS - MV E/A: 1.41
BH CV ECHO MEAS - MV MAX PG: 6 MMHG
BH CV ECHO MEAS - MV MEAN PG: 2.7 MMHG
BH CV ECHO MEAS - MV V2 VTI: 40.8 CM
BH CV ECHO MEAS - MVA(VTI): 1.2 CM2
BH CV ECHO MEAS - RAP SYSTOLE: 3 MMHG
BH CV ECHO MEAS - RVDD: 2.46 CM
BH CV ECHO MEAS - RVSP: 38.5 MMHG
BH CV ECHO MEAS - SI(MOD-SP4): 31.1 ML/M2
BH CV ECHO MEAS - SV(LVOT): 49 ML
BH CV ECHO MEAS - SV(MOD-SP4): 63.3 ML
BH CV ECHO MEAS - TR MAX PG: 35.5 MMHG
BH CV ECHO MEAS - TR MAX VEL: 285.4 CM/SEC
MAXIMAL PREDICTED HEART RATE: 141 BPM
STRESS TARGET HR: 120 BPM

## 2022-09-29 PROCEDURE — 99214 OFFICE O/P EST MOD 30 MIN: CPT | Performed by: INTERNAL MEDICINE

## 2022-09-29 PROCEDURE — 93306 TTE W/DOPPLER COMPLETE: CPT | Performed by: INTERNAL MEDICINE

## 2022-09-29 PROCEDURE — 93306 TTE W/DOPPLER COMPLETE: CPT

## 2022-09-29 RX ORDER — LISINOPRIL 10 MG/1
10 TABLET ORAL DAILY
Qty: 90 TABLET | Refills: 3 | Status: SHIPPED | OUTPATIENT
Start: 2022-09-29

## 2022-09-29 NOTE — PROGRESS NOTES
"    Subjective:     Encounter Date:09/29/2022      Patient ID: Maurice Alvarado is a 81 y.o. male.    Chief Complaint:  History of Present Illness 81-year-old white male with history of mitral valve disease hypertension diabetes hyperlipidemia presents to my office for follow-up.  Patient is currently stable without any symptoms of chest pain or shortness of breath at rest or exertion radiograms any PND orthopnea.  No palpitation dizziness syncope or swelling of the feet.  He is take his meds regular.  He does not smoke.    The following portions of the patient's history were reviewed and updated as appropriate: allergies, current medications, past family history, past medical history, past social history, past surgical history and problem list.  Past Medical History:   Diagnosis Date   • Diabetes mellitus (HCC)    • Heart murmur    • Heart valve disease    • Hypertension      Past Surgical History:   Procedure Laterality Date   • GALLBLADDER SURGERY  2002     /81   Pulse 59   Ht 182.9 cm (72\")   Wt 80.7 kg (178 lb)   SpO2 99%   BMI 24.14 kg/m²   Family History   Problem Relation Age of Onset   • No Known Problems Mother    • Heart attack Father    • No Known Problems Sister    • No Known Problems Brother    • No Known Problems Maternal Aunt    • No Known Problems Maternal Uncle    • No Known Problems Paternal Aunt    • No Known Problems Paternal Uncle    • No Known Problems Maternal Grandmother    • No Known Problems Maternal Grandfather    • No Known Problems Paternal Grandmother    • No Known Problems Paternal Grandfather    • No Known Problems Other    • Anemia Neg Hx    • Arrhythmia Neg Hx    • Asthma Neg Hx    • Clotting disorder Neg Hx    • Fainting Neg Hx    • Heart disease Neg Hx    • Heart failure Neg Hx    • Hyperlipidemia Neg Hx    • Hypertension Neg Hx        Current Outpatient Medications:   •  aspirin (aspirin) 81 MG EC tablet, ASPIR-LOW 81 MG TBEC, Disp: , Rfl:   •  B Complex-Biotin-FA " (SUPER B-COMPLEX) capsule, SUPER B-COMPLEX CAPS, Disp: , Rfl:   •  Coenzyme Q10 (COQ-10) 200 MG capsule, Take  by mouth., Disp: , Rfl:   •  Flaxseed, Linseed, (FLAXSEED OIL) oil, FLAX SEED OIL CAPS, Disp: , Rfl:   •  metFORMIN (GLUCOPHAGE) 500 MG tablet, Take 500 mg by mouth 2 (Two) Times a Day With Meals., Disp: , Rfl:   •  Misc Natural Products (PROSTATE SUPPORT PO), Take  by mouth., Disp: , Rfl:   •  Multiple Vitamins-Minerals (EQ COMPLETE MULTIVITAMIN-ADULT) tablet, EQ COMPLETE MULTIVITAMIN-ADULT TABS, Disp: , Rfl:   •  vitamin C (ASCORBIC ACID) 500 MG tablet, VITAMIN C 500 MG TABS, Disp: , Rfl:   No Known Allergies  Social History     Socioeconomic History   • Marital status:    Tobacco Use   • Smoking status: Never Smoker   • Smokeless tobacco: Never Used   Vaping Use   • Vaping Use: Never used   Substance and Sexual Activity   • Alcohol use: Yes     Alcohol/week: 2.0 standard drinks     Types: 1 Glasses of wine, 1 Cans of beer per week     Comment: occ    • Drug use: Never   • Sexual activity: Not Currently     Partners: Female     Review of Systems   Constitutional: Negative for fever and malaise/fatigue.   Cardiovascular: Negative for chest pain, dyspnea on exertion and palpitations.   Respiratory: Negative for cough and shortness of breath.    Skin: Negative for rash.   Gastrointestinal: Negative for abdominal pain, nausea and vomiting.   Neurological: Negative for focal weakness and headaches.   All other systems reviewed and are negative.             Objective:     Constitutional:       Appearance: Well-developed.   Eyes:      General: No scleral icterus.     Conjunctiva/sclera: Conjunctivae normal.   HENT:      Head: Normocephalic and atraumatic.   Neck:      Vascular: No carotid bruit or JVD.   Pulmonary:      Effort: Pulmonary effort is normal.      Breath sounds: Normal breath sounds. No wheezing. No rales.   Cardiovascular:      Normal rate. Regular rhythm.      Murmurs: There is a systolic  murmur.   Pulses:     Intact distal pulses.   Abdominal:      General: Bowel sounds are normal.      Palpations: Abdomen is soft.   Musculoskeletal:      Cervical back: Normal range of motion and neck supple. Skin:     General: Skin is warm and dry.      Findings: No rash.   Neurological:      Mental Status: Alert.       Procedures    Lab Review:         MDM    1.  Mitral valve disease  Patient has mitral valve disease with moderate to severe mitral regurgitation now but has normal LV function and is not on any medicines and hence I will start him on low-dose ACE inhibitor's  2.  Hypertension  Patient blood pressure is slightly high and I will start him on low-dose ACE inhibitor's  3.  Diabetes  Patient is on metformin and his sugar levels are followed by the primary care doctor  4.  Hyperlipidemia  Patient is on over-the-counter medicines and followed by the primary care doctor.    Patient's previous medical records, labs, and EKG were reviewed and discussed with the patient at today's visit.

## 2023-03-29 ENCOUNTER — OFFICE VISIT (OUTPATIENT)
Dept: CARDIOLOGY | Facility: CLINIC | Age: 83
End: 2023-03-29
Payer: MEDICARE

## 2023-03-29 VITALS
HEIGHT: 72 IN | SYSTOLIC BLOOD PRESSURE: 136 MMHG | DIASTOLIC BLOOD PRESSURE: 84 MMHG | BODY MASS INDEX: 23.7 KG/M2 | HEART RATE: 57 BPM | OXYGEN SATURATION: 99 % | WEIGHT: 175 LBS

## 2023-03-29 DIAGNOSIS — E11.9 TYPE 2 DIABETES MELLITUS WITHOUT COMPLICATION, WITHOUT LONG-TERM CURRENT USE OF INSULIN: ICD-10-CM

## 2023-03-29 DIAGNOSIS — I10 ESSENTIAL HYPERTENSION: ICD-10-CM

## 2023-03-29 DIAGNOSIS — E78.00 PURE HYPERCHOLESTEROLEMIA: ICD-10-CM

## 2023-03-29 DIAGNOSIS — I05.9 MITRAL VALVE DISEASE: Primary | ICD-10-CM

## 2023-03-29 NOTE — PROGRESS NOTES
"    Subjective:     Encounter Date:03/29/2023      Patient ID: Maurice Alvarado is a 82 y.o. male.    Chief Complaint:  History of Present Illness 82-year-old white male with history of mitral valve disease with mitral regurgitation hypertension diabetes hyperlipidemia presents to office for follow-up.  Patient is currently stable without any symptoms of chest pain or shortness of breath at rest or exertion radiograms any PND orthopnea.  No palpitation dizziness syncope or swelling of the feet.  Patient has been taking all her medicines regularly.  Patient does not smoke    The following portions of the patient's history were reviewed and updated as appropriate: allergies, current medications, past family history, past medical history, past social history, past surgical history and problem list.  Past Medical History:   Diagnosis Date   • Diabetes mellitus (HCC)    • Heart murmur    • Heart valve disease    • Hypertension      Past Surgical History:   Procedure Laterality Date   • GALLBLADDER SURGERY  2002     /86   Pulse 57   Ht 182.9 cm (72.01\")   Wt 79.4 kg (175 lb)   SpO2 99%   BMI 23.73 kg/m²   Family History   Problem Relation Age of Onset   • No Known Problems Mother    • Heart attack Father    • No Known Problems Sister    • No Known Problems Brother    • No Known Problems Maternal Aunt    • No Known Problems Maternal Uncle    • No Known Problems Paternal Aunt    • No Known Problems Paternal Uncle    • No Known Problems Maternal Grandmother    • No Known Problems Maternal Grandfather    • No Known Problems Paternal Grandmother    • No Known Problems Paternal Grandfather    • No Known Problems Other    • Anemia Neg Hx    • Arrhythmia Neg Hx    • Asthma Neg Hx    • Clotting disorder Neg Hx    • Fainting Neg Hx    • Heart disease Neg Hx    • Heart failure Neg Hx    • Hyperlipidemia Neg Hx    • Hypertension Neg Hx        Current Outpatient Medications:   •  aspirin (aspirin) 81 MG EC tablet, ASPIR-LOW " 81 MG TBEC, Disp: , Rfl:   •  B Complex-Biotin-FA (SUPER B-COMPLEX) capsule, SUPER B-COMPLEX CAPS, Disp: , Rfl:   •  Coenzyme Q10 (COQ-10) 200 MG capsule, Take  by mouth., Disp: , Rfl:   •  Flaxseed, Linseed, (FLAXSEED OIL) oil, FLAX SEED OIL CAPS, Disp: , Rfl:   •  lisinopril (PRINIVIL,ZESTRIL) 10 MG tablet, Take 1 tablet by mouth Daily., Disp: 90 tablet, Rfl: 3  •  metFORMIN (GLUCOPHAGE) 500 MG tablet, Take 1 tablet by mouth 2 (Two) Times a Day With Meals., Disp: , Rfl:   •  Misc Natural Products (PROSTATE SUPPORT PO), Take  by mouth., Disp: , Rfl:   •  Multiple Vitamins-Minerals (EQ COMPLETE MULTIVITAMIN-ADULT) tablet, EQ COMPLETE MULTIVITAMIN-ADULT TABS, Disp: , Rfl:   •  vitamin C (ASCORBIC ACID) 500 MG tablet, VITAMIN C 500 MG TABS, Disp: , Rfl:   No Known Allergies  Social History     Socioeconomic History   • Marital status:    Tobacco Use   • Smoking status: Never   • Smokeless tobacco: Never   Vaping Use   • Vaping Use: Never used   Substance and Sexual Activity   • Alcohol use: Yes     Alcohol/week: 2.0 standard drinks     Types: 1 Glasses of wine, 1 Cans of beer per week     Comment: occ    • Drug use: Never   • Sexual activity: Not Currently     Partners: Female     Review of Systems   Constitutional: Negative for malaise/fatigue.   Cardiovascular: Negative for chest pain, dyspnea on exertion, leg swelling and palpitations.   Respiratory: Negative for cough and shortness of breath.    Gastrointestinal: Negative for abdominal pain, nausea and vomiting.   Neurological: Negative for dizziness, focal weakness, headaches, light-headedness and numbness.   All other systems reviewed and are negative.             Objective:     Constitutional:       Appearance: Well-developed.   Eyes:      General: No scleral icterus.     Conjunctiva/sclera: Conjunctivae normal.   HENT:      Head: Normocephalic and atraumatic.   Neck:      Vascular: No carotid bruit or JVD.   Pulmonary:      Effort: Pulmonary effort is  normal.      Breath sounds: Normal breath sounds. No wheezing. No rales.   Cardiovascular:      Normal rate. Regular rhythm.      Murmurs: There is a systolic murmur.   Pulses:     Intact distal pulses.   Abdominal:      General: Bowel sounds are normal.      Palpations: Abdomen is soft.   Musculoskeletal:      Cervical back: Normal range of motion and neck supple. Skin:     General: Skin is warm and dry.      Findings: No rash.   Neurological:      Mental Status: Alert.       Procedures    Lab Review:         MDM  1.  Mitral valve disease  Patient has moderate mitral regurgitation with normal V function is currently stable on medications  2.  Hypertension  Patient blood pressure currently stable on lisinopril  3.  Diabetes  Patient is on oral medicines and followed by primary care doctor  4.  Hyperlipidemia  Patient is on over-the-counter medicines and followed by the primary care doctor      Patient's previous medical records, labs, and EKG were reviewed and discussed with the patient at today's visit.

## 2023-09-21 RX ORDER — LISINOPRIL 10 MG/1
TABLET ORAL
Qty: 90 TABLET | Refills: 3 | Status: SHIPPED | OUTPATIENT
Start: 2023-09-21

## 2023-09-21 NOTE — TELEPHONE ENCOUNTER
Rx Refill Note  Requested Prescriptions     Pending Prescriptions Disp Refills    lisinopril (PRINIVIL,ZESTRIL) 10 MG tablet [Pharmacy Med Name: LISINOPRIL 10 MG TABLET] 90 tablet 3     Sig: TAKE ONE TABLET BY MOUTH DAILY      Last office visit with prescribing clinician: 3/29/2023   Last telemedicine visit with prescribing clinician: Visit date not found   Next office visit with prescribing clinician: 10/10/2023                         Would you like a call back once the refill request has been completed: [] Yes [] No    If the office needs to give you a call back, can they leave a voicemail: [] Yes [] No    Ana Clemons MA  09/21/23, 09:31 EDT

## 2023-10-10 ENCOUNTER — OFFICE VISIT (OUTPATIENT)
Dept: CARDIOLOGY | Facility: CLINIC | Age: 83
End: 2023-10-10
Payer: MEDICARE

## 2023-10-10 VITALS
HEART RATE: 59 BPM | SYSTOLIC BLOOD PRESSURE: 125 MMHG | HEIGHT: 72 IN | OXYGEN SATURATION: 97 % | BODY MASS INDEX: 23.3 KG/M2 | DIASTOLIC BLOOD PRESSURE: 68 MMHG | WEIGHT: 172 LBS

## 2023-10-10 DIAGNOSIS — E78.00 PURE HYPERCHOLESTEROLEMIA: ICD-10-CM

## 2023-10-10 DIAGNOSIS — E11.9 TYPE 2 DIABETES MELLITUS WITHOUT COMPLICATION, WITHOUT LONG-TERM CURRENT USE OF INSULIN: ICD-10-CM

## 2023-10-10 DIAGNOSIS — I10 ESSENTIAL HYPERTENSION: ICD-10-CM

## 2023-10-10 DIAGNOSIS — I05.9 MITRAL VALVE DISEASE: Primary | ICD-10-CM

## 2023-10-10 DIAGNOSIS — I34.0 NONRHEUMATIC MITRAL (VALVE) INSUFFICIENCY: ICD-10-CM

## 2023-10-10 PROCEDURE — 1160F RVW MEDS BY RX/DR IN RCRD: CPT | Performed by: INTERNAL MEDICINE

## 2023-10-10 PROCEDURE — 3074F SYST BP LT 130 MM HG: CPT | Performed by: INTERNAL MEDICINE

## 2023-10-10 PROCEDURE — 3078F DIAST BP <80 MM HG: CPT | Performed by: INTERNAL MEDICINE

## 2023-10-10 PROCEDURE — 99214 OFFICE O/P EST MOD 30 MIN: CPT | Performed by: INTERNAL MEDICINE

## 2023-10-10 PROCEDURE — 1159F MED LIST DOCD IN RCRD: CPT | Performed by: INTERNAL MEDICINE

## 2023-10-10 NOTE — PROGRESS NOTES
"    Subjective:     Encounter Date:10/10/2023      Patient ID: Maurice Alvarado is a 82 y.o. male.    Chief Complaint:  History of Present Illness 82-year-old white male with history of mitral valve disorder with mitral insufficiency hypertension hyperlipidemia diabetes presents to my office for a follow-up.  Patient is currently stable without any symptoms of chest pain or shortness of breath at rest or exertion.  No complaint of any PND orthopnea.  No palpitation dizziness syncope or swelling of the feet.  Patient is taking all her medicines regularly.  Patient does not smoke.    The following portions of the patient's history were reviewed and updated as appropriate: allergies, current medications, past family history, past medical history, past social history, past surgical history, and problem list.  Past Medical History:   Diagnosis Date    Diabetes mellitus     Heart murmur     Heart valve disease     Hypertension      Past Surgical History:   Procedure Laterality Date    GALLBLADDER SURGERY  2002     /70   Pulse 59   Ht 182.9 cm (72\")   Wt 78 kg (172 lb)   SpO2 97%   BMI 23.33 kg/mý   Family History   Problem Relation Age of Onset    No Known Problems Mother     Heart attack Father     No Known Problems Sister     No Known Problems Brother     No Known Problems Maternal Aunt     No Known Problems Maternal Uncle     No Known Problems Paternal Aunt     No Known Problems Paternal Uncle     No Known Problems Maternal Grandmother     No Known Problems Maternal Grandfather     No Known Problems Paternal Grandmother     No Known Problems Paternal Grandfather     No Known Problems Other     Anemia Neg Hx     Arrhythmia Neg Hx     Asthma Neg Hx     Clotting disorder Neg Hx     Fainting Neg Hx     Heart disease Neg Hx     Heart failure Neg Hx     Hyperlipidemia Neg Hx     Hypertension Neg Hx        Current Outpatient Medications:     aspirin (aspirin) 81 MG EC tablet, ASPIR-LOW 81 MG TBEC, Disp: , Rfl:     B " "Care Management Initial Consult    General Information  Assessment completed with: Seng Yeh  Type of CM/SW Visit: Initial Assessment    Primary Care Provider verified and updated as needed: Yes   Readmission within the last 30 days: previous discharge plan unsuccessful (6/21/22 - 6/25/22)      Reason for Consult: discharge planning  Advance Care Planning: Advance Care Planning Reviewed: no concerns identified          Communication Assessment  Patient's communication style: spoken language (English or Bilingual)            Living Environment:   People in home: alone (\"wife Kamala is currently n [facility]\")     Current living Arrangements: house      Able to return to prior arrangements: other (see comments) (likely needs TCU)       Family/Social Support:  Care provided by: self  Provides care for: no one  Marital Status:   Wife, Other (specify) (friends)  Kamala       Description of Support System: Supportive, Involved    Support Assessment: Lacks necessary supervision and assistance    Current Resources:   Patient receiving home care services: Yes  Skilled Home Care Services: Home Health Aid, Physicial Therapy, Occupational Therapy (Unable to verify with home care agency since after hours and no return call, but had \"[facility], PT, and OT help after last hospitalization\".)  Community Resources: Housekeeping/Chore Agency (\"a friend Rosy helps with cleaning\")  Equipment currently used at home: cane, quad, walker, rolling  Supplies currently used at home: Nutritional Supplements (\"Ensure\")    Employment/Financial:  Employment Status: retired        Financial Concerns:     Referral to Financial Worker: No       Lifestyle & Psychosocial Needs:  Social Determinants of Health     Tobacco Use: Low Risk      Smoking Tobacco Use: Never Smoker     Smokeless Tobacco Use: Never Used   Alcohol Use: Not on file   Financial Resource Strain: Not on file   Food Insecurity: Not on file " "  Transportation Needs: Not on file   Physical Activity: Not on file   Stress: Not on file   Social Connections: Not on file   Intimate Partner Violence: Not on file   Depression: Not at risk     PHQ-2 Score: 2   Housing Stability: Not on file       Functional Status:  Prior to admission patient needed assistance:   Dependent ADLs:: Ambulation-cane, Ambulation-walker, Independent  Dependent IADLs:: Cleaning  Assesssment of Functional Status: Not at baseline with ADL Functioning, Not at baseline with mobility, Not at  functional baseline    Mental Health Status:          Chemical Dependency Status:                Values/Beliefs:  Spiritual, Cultural Beliefs, Hinduism Practices, Values that affect care:                 Additional Information:  Seng lives in a house alone. It has multiple levels. His wife is in [facility].    He is independent with ADLs and uses a walker or cane for mobility. He has help from a \"friend Rosy who helps with housekeeping\".    At discharge on 6/25/22 he was set up with [facility], PT and OT. I was unable to verify with them if he is still open to their services, since it is after hours and they did not return my call.    He likely needs TCU also and would like to be at [facility] where his wife is.    [facility] transport at discharge.    Evie Mccormick RN      " Complex-Biotin-FA (SUPER B-COMPLEX) capsule, SUPER B-COMPLEX CAPS, Disp: , Rfl:     Coenzyme Q10 (COQ-10) 200 MG capsule, Take  by mouth., Disp: , Rfl:     Flaxseed, Linseed, (FLAXSEED OIL) oil, FLAX SEED OIL CAPS, Disp: , Rfl:     lisinopril (PRINIVIL,ZESTRIL) 10 MG tablet, TAKE ONE TABLET BY MOUTH DAILY, Disp: 90 tablet, Rfl: 3    metFORMIN (GLUCOPHAGE) 500 MG tablet, Take 1 tablet by mouth 2 (Two) Times a Day With Meals., Disp: , Rfl:     Misc Natural Products (PROSTATE SUPPORT PO), Take  by mouth., Disp: , Rfl:     Multiple Vitamins-Minerals (EQ COMPLETE MULTIVITAMIN-ADULT) tablet, EQ COMPLETE MULTIVITAMIN-ADULT TABS, Disp: , Rfl:     vitamin C (ASCORBIC ACID) 500 MG tablet, VITAMIN C 500 MG TABS, Disp: , Rfl:   No Known Allergies  Social History     Socioeconomic History    Marital status:    Tobacco Use    Smoking status: Never     Passive exposure: Never    Smokeless tobacco: Never   Vaping Use    Vaping Use: Never used   Substance and Sexual Activity    Alcohol use: Yes     Alcohol/week: 2.0 standard drinks of alcohol     Types: 1 Glasses of wine, 1 Cans of beer per week     Comment: occ     Drug use: Never    Sexual activity: Not Currently     Partners: Female     Review of Systems   Constitutional: Positive for malaise/fatigue.   Cardiovascular:  Negative for chest pain, dyspnea on exertion, leg swelling and palpitations.   Respiratory:  Negative for cough and shortness of breath.    Gastrointestinal:  Negative for abdominal pain, nausea and vomiting.   Neurological:  Negative for dizziness, focal weakness, headaches, light-headedness and numbness.   All other systems reviewed and are negative.             Objective:     Constitutional:       Appearance: Well-developed.   Eyes:      General: No scleral icterus.     Conjunctiva/sclera: Conjunctivae normal.   HENT:      Head: Normocephalic and atraumatic.   Neck:      Vascular: No carotid bruit or JVD.   Pulmonary:      Effort: Pulmonary effort is  normal.      Breath sounds: Normal breath sounds. No wheezing. No rales.   Cardiovascular:      Normal rate. Regular rhythm.      Murmurs: There is a systolic murmur.   Pulses:     Intact distal pulses.   Abdominal:      General: Bowel sounds are normal.      Palpations: Abdomen is soft.   Musculoskeletal:      Cervical back: Normal range of motion and neck supple. Skin:     General: Skin is warm and dry.      Findings: No rash.   Neurological:      Mental Status: Alert.       Procedures    Lab Review:         MDM    #1 mitral valve disease  Patient has history of mitral valve disease status post echo which showed mitral regurgitation the past and I will check an echocardiogram at his next visit to assess the mitral valve  2.  Hypertension  Patient blood pressure is currently stable on lisinopril  3.  Diabetes  Patient is on metformin and then sugar levels are followed by the primary care doctor  4.  Hyperlipidemia  Patient is lipid levels are followed by the primary care doctor.    Patient's previous medical records, labs, and EKG were reviewed and discussed with the patient at today's visit.

## 2024-04-24 ENCOUNTER — OFFICE VISIT (OUTPATIENT)
Dept: CARDIOLOGY | Facility: CLINIC | Age: 84
End: 2024-04-24
Payer: MEDICARE

## 2024-04-24 ENCOUNTER — HOSPITAL ENCOUNTER (OUTPATIENT)
Dept: CARDIOLOGY | Facility: HOSPITAL | Age: 84
Discharge: HOME | End: 2024-04-24
Admitting: INTERNAL MEDICINE
Payer: MEDICARE

## 2024-04-24 VITALS
DIASTOLIC BLOOD PRESSURE: 73 MMHG | SYSTOLIC BLOOD PRESSURE: 134 MMHG | HEIGHT: 72 IN | OXYGEN SATURATION: 99 % | HEART RATE: 53 BPM | WEIGHT: 178 LBS | BODY MASS INDEX: 24.11 KG/M2

## 2024-04-24 DIAGNOSIS — I05.9 MITRAL VALVE DISEASE: ICD-10-CM

## 2024-04-24 DIAGNOSIS — I34.0 NONRHEUMATIC MITRAL (VALVE) INSUFFICIENCY: ICD-10-CM

## 2024-04-24 DIAGNOSIS — I10 ESSENTIAL HYPERTENSION: ICD-10-CM

## 2024-04-24 DIAGNOSIS — E78.00 PURE HYPERCHOLESTEROLEMIA: ICD-10-CM

## 2024-04-24 DIAGNOSIS — E11.9 TYPE 2 DIABETES MELLITUS WITHOUT COMPLICATION, WITHOUT LONG-TERM CURRENT USE OF INSULIN: ICD-10-CM

## 2024-04-24 DIAGNOSIS — I05.9 MITRAL VALVE DISEASE: Primary | ICD-10-CM

## 2024-04-24 LAB
BH CV ECHO MEAS - ACS: 1.8 CM
BH CV ECHO MEAS - AO MAX PG: 6.4 MMHG
BH CV ECHO MEAS - AO MEAN PG: 3.2 MMHG
BH CV ECHO MEAS - AO ROOT DIAM: 3.3 CM
BH CV ECHO MEAS - AO V2 MAX: 126.7 CM/SEC
BH CV ECHO MEAS - AO V2 VTI: 29 CM
BH CV ECHO MEAS - AVA(I,D): 2.13 CM2
BH CV ECHO MEAS - EDV(CUBED): 99.4 ML
BH CV ECHO MEAS - EDV(MOD-SP4): 101.2 ML
BH CV ECHO MEAS - EF(MOD-BP): 60 %
BH CV ECHO MEAS - EF(MOD-SP4): 59.6 %
BH CV ECHO MEAS - ESV(CUBED): 37.8 ML
BH CV ECHO MEAS - ESV(MOD-SP4): 40.9 ML
BH CV ECHO MEAS - FS: 27.6 %
BH CV ECHO MEAS - IVS/LVPW: 0.99 CM
BH CV ECHO MEAS - IVSD: 1.11 CM
BH CV ECHO MEAS - LA DIMENSION: 3.9 CM
BH CV ECHO MEAS - LV MASS(C)D: 187.4 GRAMS
BH CV ECHO MEAS - LV MAX PG: 1.47 MMHG
BH CV ECHO MEAS - LV MEAN PG: 0.66 MMHG
BH CV ECHO MEAS - LV V1 MAX: 60.5 CM/SEC
BH CV ECHO MEAS - LV V1 VTI: 16.7 CM
BH CV ECHO MEAS - LVIDD: 4.6 CM
BH CV ECHO MEAS - LVIDS: 3.4 CM
BH CV ECHO MEAS - LVOT AREA: 3.7 CM2
BH CV ECHO MEAS - LVOT DIAM: 2.17 CM
BH CV ECHO MEAS - LVPWD: 1.12 CM
BH CV ECHO MEAS - MR MAX PG: 103.2 MMHG
BH CV ECHO MEAS - MR MAX VEL: 508 CM/SEC
BH CV ECHO MEAS - MV A MAX VEL: 86.9 CM/SEC
BH CV ECHO MEAS - MV DEC SLOPE: 644.2 CM/SEC2
BH CV ECHO MEAS - MV DEC TIME: 0.18 SEC
BH CV ECHO MEAS - MV E MAX VEL: 117.1 CM/SEC
BH CV ECHO MEAS - MV E/A: 1.35
BH CV ECHO MEAS - MV MAX PG: 5.4 MMHG
BH CV ECHO MEAS - MV MEAN PG: 2.2 MMHG
BH CV ECHO MEAS - MV V2 VTI: 42.3 CM
BH CV ECHO MEAS - MVA(VTI): 1.46 CM2
BH CV ECHO MEAS - PA ACC TIME: 0.14 SEC
BH CV ECHO MEAS - PULM A REVS DUR: 0.14 SEC
BH CV ECHO MEAS - PULM A REVS VEL: 29.1 CM/SEC
BH CV ECHO MEAS - PULM DIAS VEL: 51.3 CM/SEC
BH CV ECHO MEAS - PULM S/D: 1.11
BH CV ECHO MEAS - PULM SYS VEL: 57.1 CM/SEC
BH CV ECHO MEAS - RAP SYSTOLE: 8 MMHG
BH CV ECHO MEAS - RV MAX PG: 1.61 MMHG
BH CV ECHO MEAS - RV V1 MAX: 63.5 CM/SEC
BH CV ECHO MEAS - RV V1 VTI: 14.3 CM
BH CV ECHO MEAS - RVSP: 26.8 MMHG
BH CV ECHO MEAS - SV(LVOT): 61.9 ML
BH CV ECHO MEAS - SV(MOD-SP4): 60.3 ML
BH CV ECHO MEAS - TR MAX PG: 18.8 MMHG
BH CV ECHO MEAS - TR MAX VEL: 216.8 CM/SEC

## 2024-04-24 PROCEDURE — 93306 TTE W/DOPPLER COMPLETE: CPT

## 2024-04-24 PROCEDURE — 93356 MYOCRD STRAIN IMG SPCKL TRCK: CPT

## 2024-04-24 NOTE — H&P (VIEW-ONLY)
"    Subjective:     Encounter Date:04/24/2024      Patient ID: Maurice Alvarado is a 83 y.o. male.    Chief Complaint:  History of Present Illness 83-year-old white male with history of mitral valve disorder with severe mitral gestation the past history of hypertension hyperlipidemia and diabetes presents to office for a follow-up.  Patient is currently stable without any symptoms of chest pain or shortness of breath at rest on exertion.  No complaint of any PND orthopnea.  No palpitation dizziness syncope or swelling of the feet.  He is taking his medicine regular.  He does not smoke    The following portions of the patient's history were reviewed and updated as appropriate: allergies, current medications, past family history, past medical history, past social history, past surgical history, and problem list.  Past Medical History:   Diagnosis Date    Diabetes mellitus     Heart murmur     Heart valve disease     Hypertension      Past Surgical History:   Procedure Laterality Date    GALLBLADDER SURGERY  2002     /73   Pulse 53   Ht 182.9 cm (72.01\")   Wt 80.7 kg (178 lb)   SpO2 99%   BMI 24.14 kg/m²   Family History   Problem Relation Age of Onset    No Known Problems Mother     Heart attack Father     No Known Problems Sister     No Known Problems Brother     No Known Problems Maternal Aunt     No Known Problems Maternal Uncle     No Known Problems Paternal Aunt     No Known Problems Paternal Uncle     No Known Problems Maternal Grandmother     No Known Problems Maternal Grandfather     No Known Problems Paternal Grandmother     No Known Problems Paternal Grandfather     No Known Problems Other     Anemia Neg Hx     Arrhythmia Neg Hx     Asthma Neg Hx     Clotting disorder Neg Hx     Fainting Neg Hx     Heart disease Neg Hx     Heart failure Neg Hx     Hyperlipidemia Neg Hx     Hypertension Neg Hx        Current Outpatient Medications:     B Complex-Biotin-FA (SUPER B-COMPLEX) capsule, SUPER B-COMPLEX " CAPS, Disp: , Rfl:     Coenzyme Q10 (COQ-10) 200 MG capsule, Take  by mouth., Disp: , Rfl:     Flaxseed, Linseed, (FLAXSEED OIL) oil, FLAX SEED OIL CAPS, Disp: , Rfl:     lisinopril (PRINIVIL,ZESTRIL) 10 MG tablet, TAKE ONE TABLET BY MOUTH DAILY, Disp: 90 tablet, Rfl: 3    metFORMIN (GLUCOPHAGE) 500 MG tablet, Take 1 tablet by mouth 2 (Two) Times a Day With Meals., Disp: , Rfl:     Multiple Vitamins-Minerals (EQ COMPLETE MULTIVITAMIN-ADULT) tablet, EQ COMPLETE MULTIVITAMIN-ADULT TABS, Disp: , Rfl:     vitamin C (ASCORBIC ACID) 500 MG tablet, VITAMIN C 500 MG TABS, Disp: , Rfl:   No Known Allergies  Social History     Socioeconomic History    Marital status:    Tobacco Use    Smoking status: Never     Passive exposure: Never    Smokeless tobacco: Never   Vaping Use    Vaping status: Never Used   Substance and Sexual Activity    Alcohol use: Yes     Alcohol/week: 2.0 standard drinks of alcohol     Types: 1 Glasses of wine, 1 Cans of beer per week     Comment: occ     Drug use: Never    Sexual activity: Not Currently     Partners: Female     Review of Systems   Constitutional: Negative for malaise/fatigue.   Cardiovascular:  Negative for chest pain, dyspnea on exertion, leg swelling and palpitations.   Respiratory:  Negative for cough and shortness of breath.    Gastrointestinal:  Negative for abdominal pain, nausea and vomiting.   Neurological:  Negative for dizziness, focal weakness, headaches, light-headedness and numbness.   All other systems reviewed and are negative.             Objective:     Constitutional:       Appearance: Well-developed.   Eyes:      General: No scleral icterus.     Conjunctiva/sclera: Conjunctivae normal.      Pupils: Pupils are equal, round, and reactive to light.   HENT:      Head: Normocephalic and atraumatic.   Neck:      Vascular: No carotid bruit or JVD.   Pulmonary:      Effort: Pulmonary effort is normal.      Breath sounds: Normal breath sounds. No wheezing. No rales.    Cardiovascular:      Normal rate. Regular rhythm.      Murmurs: There is a systolic murmur.   Pulses:     Intact distal pulses.   Abdominal:      General: Bowel sounds are normal.      Palpations: Abdomen is soft.   Musculoskeletal: Normal range of motion.      Cervical back: Normal range of motion and neck supple. Skin:     General: Skin is warm and dry.      Findings: No rash.   Neurological:      Mental Status: Alert.      Comments: No focal deficits       Procedures    Lab Review:         MDM    #1 mitral valve disease  Patient had mitral regurgitation the past but today's echocardiogram showed severe mitral gestation with normal function and hence he will need a LEO and a cardiac catheterization to assess the mechanism and also for treatment  2.  Hypertension  Patient blood pressure currently stable on lisinopril  3 diabetes  Patient on metformin  4.  Hyperlipidemia  Patient is on over-the-counter medicines only    Patient's previous medical records, labs, and EKG were reviewed and discussed with the patient at today's visit.

## 2024-05-06 ENCOUNTER — LAB (OUTPATIENT)
Dept: LAB | Facility: HOSPITAL | Age: 84
End: 2024-05-06
Payer: MEDICARE

## 2024-05-06 DIAGNOSIS — E78.00 PURE HYPERCHOLESTEROLEMIA: ICD-10-CM

## 2024-05-06 DIAGNOSIS — E11.9 TYPE 2 DIABETES MELLITUS WITHOUT COMPLICATION, WITHOUT LONG-TERM CURRENT USE OF INSULIN: ICD-10-CM

## 2024-05-06 DIAGNOSIS — I05.9 MITRAL VALVE DISEASE: ICD-10-CM

## 2024-05-06 DIAGNOSIS — I10 ESSENTIAL HYPERTENSION: ICD-10-CM

## 2024-05-06 LAB
ANION GAP SERPL CALCULATED.3IONS-SCNC: 7.9 MMOL/L (ref 5–15)
BASOPHILS # BLD AUTO: 0.05 10*3/MM3 (ref 0–0.2)
BASOPHILS NFR BLD AUTO: 0.9 % (ref 0–1.5)
BUN SERPL-MCNC: 13 MG/DL (ref 8–23)
BUN/CREAT SERPL: 13.3 (ref 7–25)
CALCIUM SPEC-SCNC: 9.1 MG/DL (ref 8.6–10.5)
CHLORIDE SERPL-SCNC: 105 MMOL/L (ref 98–107)
CO2 SERPL-SCNC: 27.1 MMOL/L (ref 22–29)
CREAT SERPL-MCNC: 0.98 MG/DL (ref 0.76–1.27)
DEPRECATED RDW RBC AUTO: 43.6 FL (ref 37–54)
EGFRCR SERPLBLD CKD-EPI 2021: 77.5 ML/MIN/1.73
EOSINOPHIL # BLD AUTO: 0.35 10*3/MM3 (ref 0–0.4)
EOSINOPHIL NFR BLD AUTO: 6.2 % (ref 0.3–6.2)
ERYTHROCYTE [DISTWIDTH] IN BLOOD BY AUTOMATED COUNT: 12.7 % (ref 12.3–15.4)
GLUCOSE SERPL-MCNC: 222 MG/DL (ref 65–99)
HCT VFR BLD AUTO: 39.7 % (ref 37.5–51)
HGB BLD-MCNC: 12.8 G/DL (ref 13–17.7)
IMM GRANULOCYTES # BLD AUTO: 0.01 10*3/MM3 (ref 0–0.05)
IMM GRANULOCYTES NFR BLD AUTO: 0.2 % (ref 0–0.5)
LYMPHOCYTES # BLD AUTO: 1.27 10*3/MM3 (ref 0.7–3.1)
LYMPHOCYTES NFR BLD AUTO: 22.6 % (ref 19.6–45.3)
MCH RBC QN AUTO: 30.3 PG (ref 26.6–33)
MCHC RBC AUTO-ENTMCNC: 32.2 G/DL (ref 31.5–35.7)
MCV RBC AUTO: 94.1 FL (ref 79–97)
MONOCYTES # BLD AUTO: 0.43 10*3/MM3 (ref 0.1–0.9)
MONOCYTES NFR BLD AUTO: 7.7 % (ref 5–12)
NEUTROPHILS NFR BLD AUTO: 3.51 10*3/MM3 (ref 1.7–7)
NEUTROPHILS NFR BLD AUTO: 62.4 % (ref 42.7–76)
NRBC BLD AUTO-RTO: 0 /100 WBC (ref 0–0.2)
PLATELET # BLD AUTO: 188 10*3/MM3 (ref 140–450)
PMV BLD AUTO: 9.9 FL (ref 6–12)
POTASSIUM SERPL-SCNC: 4.1 MMOL/L (ref 3.5–5.2)
RBC # BLD AUTO: 4.22 10*6/MM3 (ref 4.14–5.8)
SODIUM SERPL-SCNC: 140 MMOL/L (ref 136–145)
WBC NRBC COR # BLD AUTO: 5.62 10*3/MM3 (ref 3.4–10.8)

## 2024-05-06 PROCEDURE — 36415 COLL VENOUS BLD VENIPUNCTURE: CPT

## 2024-05-06 PROCEDURE — 85025 COMPLETE CBC W/AUTO DIFF WBC: CPT

## 2024-05-06 PROCEDURE — 80048 BASIC METABOLIC PNL TOTAL CA: CPT

## 2024-05-07 RX ORDER — TAMSULOSIN HYDROCHLORIDE 0.4 MG/1
1 CAPSULE ORAL 2 TIMES DAILY
COMMUNITY

## 2024-05-08 ENCOUNTER — ANESTHESIA (OUTPATIENT)
Dept: CARDIOLOGY | Facility: HOSPITAL | Age: 84
End: 2024-05-08
Payer: MEDICARE

## 2024-05-08 ENCOUNTER — ANESTHESIA EVENT (OUTPATIENT)
Dept: CARDIOLOGY | Facility: HOSPITAL | Age: 84
End: 2024-05-08
Payer: MEDICARE

## 2024-05-08 ENCOUNTER — PREP FOR SURGERY (OUTPATIENT)
Dept: OTHER | Facility: HOSPITAL | Age: 84
End: 2024-05-08
Payer: MEDICARE

## 2024-05-08 ENCOUNTER — HOSPITAL ENCOUNTER (OUTPATIENT)
Dept: CARDIOLOGY | Facility: HOSPITAL | Age: 84
Setting detail: HOSPITAL OUTPATIENT SURGERY
Discharge: HOME | End: 2024-05-08
Payer: MEDICARE

## 2024-05-08 ENCOUNTER — HOSPITAL ENCOUNTER (OUTPATIENT)
Facility: HOSPITAL | Age: 84
Setting detail: HOSPITAL OUTPATIENT SURGERY
Discharge: HOME | End: 2024-05-08
Attending: INTERNAL MEDICINE | Admitting: INTERNAL MEDICINE
Payer: MEDICARE

## 2024-05-08 VITALS
DIASTOLIC BLOOD PRESSURE: 82 MMHG | SYSTOLIC BLOOD PRESSURE: 150 MMHG | TEMPERATURE: 98.1 F | RESPIRATION RATE: 18 BRPM | BODY MASS INDEX: 23.35 KG/M2 | OXYGEN SATURATION: 99 % | HEART RATE: 59 BPM | WEIGHT: 176.15 LBS | HEIGHT: 73 IN

## 2024-05-08 VITALS
SYSTOLIC BLOOD PRESSURE: 111 MMHG | HEART RATE: 59 BPM | DIASTOLIC BLOOD PRESSURE: 70 MMHG | OXYGEN SATURATION: 94 % | RESPIRATION RATE: 14 BRPM

## 2024-05-08 DIAGNOSIS — Z01.818 PREOPERATIVE TESTING: ICD-10-CM

## 2024-05-08 DIAGNOSIS — I25.10 CAD (CORONARY ARTERY DISEASE): ICD-10-CM

## 2024-05-08 DIAGNOSIS — R09.89 OTHER SPECIFIED SYMPTOMS AND SIGNS INVOLVING THE CIRCULATORY AND RESPIRATORY SYSTEMS: ICD-10-CM

## 2024-05-08 DIAGNOSIS — I05.9 MITRAL VALVE DISEASE: Primary | ICD-10-CM

## 2024-05-08 DIAGNOSIS — E11.9 TYPE 2 DIABETES MELLITUS WITHOUT COMPLICATION, WITHOUT LONG-TERM CURRENT USE OF INSULIN: ICD-10-CM

## 2024-05-08 DIAGNOSIS — E78.00 PURE HYPERCHOLESTEROLEMIA: ICD-10-CM

## 2024-05-08 DIAGNOSIS — I05.9 MITRAL VALVE DISEASE: ICD-10-CM

## 2024-05-08 DIAGNOSIS — I10 ESSENTIAL HYPERTENSION: ICD-10-CM

## 2024-05-08 DIAGNOSIS — R79.1 ABNORMAL COAGULATION PROFILE: ICD-10-CM

## 2024-05-08 DIAGNOSIS — R79.9 ABNORMAL FINDING OF BLOOD CHEMISTRY, UNSPECIFIED: ICD-10-CM

## 2024-05-08 DIAGNOSIS — I34.0 NONRHEUMATIC MITRAL (VALVE) INSUFFICIENCY: ICD-10-CM

## 2024-05-08 DIAGNOSIS — Z01.810 PREOP CARDIOVASCULAR EXAM: ICD-10-CM

## 2024-05-08 LAB
BH CV ECHO MEAS - MR MAX PG: 133.2 MMHG
BH CV ECHO MEAS - MR MAX VEL: 577 CM/SEC
BH CV ECHO SHUNT ASSESSMENT PERFORMED (HIDDEN SCRIPTING): 1
GLUCOSE BLDC GLUCOMTR-MCNC: 152 MG/DL (ref 70–105)

## 2024-05-08 PROCEDURE — 25010000002 PROPOFOL 200 MG/20ML EMULSION

## 2024-05-08 PROCEDURE — C1894 INTRO/SHEATH, NON-LASER: HCPCS | Performed by: INTERNAL MEDICINE

## 2024-05-08 PROCEDURE — 25510000001 IOPAMIDOL PER 1 ML: Performed by: INTERNAL MEDICINE

## 2024-05-08 PROCEDURE — 82948 REAGENT STRIP/BLOOD GLUCOSE: CPT

## 2024-05-08 PROCEDURE — 93458 L HRT ARTERY/VENTRICLE ANGIO: CPT | Performed by: INTERNAL MEDICINE

## 2024-05-08 PROCEDURE — 93325 DOPPLER ECHO COLOR FLOW MAPG: CPT

## 2024-05-08 PROCEDURE — 99222 1ST HOSP IP/OBS MODERATE 55: CPT | Performed by: PHYSICIAN ASSISTANT

## 2024-05-08 PROCEDURE — 93312 ECHO TRANSESOPHAGEAL: CPT

## 2024-05-08 PROCEDURE — 99152 MOD SED SAME PHYS/QHP 5/>YRS: CPT | Performed by: INTERNAL MEDICINE

## 2024-05-08 PROCEDURE — 25010000002 MIDAZOLAM PER 1 MG: Performed by: INTERNAL MEDICINE

## 2024-05-08 PROCEDURE — 25810000003 SODIUM CHLORIDE 0.9 % SOLUTION: Performed by: INTERNAL MEDICINE

## 2024-05-08 PROCEDURE — 25010000002 FENTANYL CITRATE (PF) 100 MCG/2ML SOLUTION: Performed by: INTERNAL MEDICINE

## 2024-05-08 PROCEDURE — C1769 GUIDE WIRE: HCPCS | Performed by: INTERNAL MEDICINE

## 2024-05-08 PROCEDURE — 93320 DOPPLER ECHO COMPLETE: CPT

## 2024-05-08 RX ORDER — SODIUM CHLORIDE 9 MG/ML
250 INJECTION, SOLUTION INTRAVENOUS ONCE AS NEEDED
Status: DISCONTINUED | OUTPATIENT
Start: 2024-05-08 | End: 2024-05-08 | Stop reason: HOSPADM

## 2024-05-08 RX ORDER — LIDOCAINE HYDROCHLORIDE 20 MG/ML
INJECTION, SOLUTION INFILTRATION; PERINEURAL
Status: DISCONTINUED | OUTPATIENT
Start: 2024-05-08 | End: 2024-05-08 | Stop reason: HOSPADM

## 2024-05-08 RX ORDER — NITROGLYCERIN 0.4 MG/1
0.4 TABLET SUBLINGUAL
OUTPATIENT
Start: 2024-05-08

## 2024-05-08 RX ORDER — SODIUM CHLORIDE 0.9 % (FLUSH) 0.9 %
30 SYRINGE (ML) INJECTION ONCE AS NEEDED
OUTPATIENT
Start: 2024-05-08

## 2024-05-08 RX ORDER — SODIUM CHLORIDE 9 MG/ML
75 INJECTION, SOLUTION INTRAVENOUS CONTINUOUS
Status: DISCONTINUED | OUTPATIENT
Start: 2024-05-08 | End: 2024-05-08 | Stop reason: HOSPADM

## 2024-05-08 RX ORDER — CHLORHEXIDINE GLUCONATE ORAL RINSE 1.2 MG/ML
15 SOLUTION DENTAL ONCE
OUTPATIENT
Start: 2024-05-08 | End: 2024-05-08

## 2024-05-08 RX ORDER — ALPRAZOLAM 0.25 MG/1
0.25 TABLET ORAL ONCE
OUTPATIENT
Start: 2024-05-08 | End: 2024-05-08

## 2024-05-08 RX ORDER — SODIUM CHLORIDE 0.9 % (FLUSH) 0.9 %
3 SYRINGE (ML) INJECTION EVERY 12 HOURS SCHEDULED
OUTPATIENT
Start: 2024-05-08

## 2024-05-08 RX ORDER — PROPOFOL 10 MG/ML
INJECTION, EMULSION INTRAVENOUS AS NEEDED
Status: DISCONTINUED | OUTPATIENT
Start: 2024-05-08 | End: 2024-05-08 | Stop reason: SURG

## 2024-05-08 RX ORDER — IBUPROFEN 600 MG/1
1 TABLET ORAL
OUTPATIENT
Start: 2024-05-08

## 2024-05-08 RX ORDER — MIDAZOLAM HYDROCHLORIDE 1 MG/ML
INJECTION INTRAMUSCULAR; INTRAVENOUS
Status: DISCONTINUED | OUTPATIENT
Start: 2024-05-08 | End: 2024-05-08 | Stop reason: HOSPADM

## 2024-05-08 RX ORDER — CHLORHEXIDINE GLUCONATE ORAL RINSE 1.2 MG/ML
15 SOLUTION DENTAL EVERY 12 HOURS
Status: CANCELLED | OUTPATIENT
Start: 2024-05-08 | End: 2024-05-09

## 2024-05-08 RX ORDER — SODIUM CHLORIDE 9 MG/ML
INJECTION, SOLUTION INTRAVENOUS CONTINUOUS PRN
Status: DISCONTINUED | OUTPATIENT
Start: 2024-05-08 | End: 2024-05-08 | Stop reason: SURG

## 2024-05-08 RX ORDER — NITROGLYCERIN 0.4 MG/1
0.4 TABLET SUBLINGUAL
Status: DISCONTINUED | OUTPATIENT
Start: 2024-05-08 | End: 2024-05-08 | Stop reason: HOSPADM

## 2024-05-08 RX ORDER — CHLORHEXIDINE GLUCONATE 500 MG/1
CLOTH TOPICAL EVERY 12 HOURS PRN
Status: CANCELLED | OUTPATIENT
Start: 2024-05-08

## 2024-05-08 RX ORDER — SODIUM CHLORIDE 9 MG/ML
INJECTION, SOLUTION INTRAVENOUS
Status: COMPLETED | OUTPATIENT
Start: 2024-05-08 | End: 2024-05-08

## 2024-05-08 RX ORDER — SODIUM CHLORIDE 0.9 % (FLUSH) 0.9 %
3-10 SYRINGE (ML) INJECTION AS NEEDED
OUTPATIENT
Start: 2024-05-08

## 2024-05-08 RX ORDER — FENTANYL CITRATE 50 UG/ML
INJECTION, SOLUTION INTRAMUSCULAR; INTRAVENOUS
Status: DISCONTINUED | OUTPATIENT
Start: 2024-05-08 | End: 2024-05-08 | Stop reason: HOSPADM

## 2024-05-08 RX ORDER — LIDOCAINE HYDROCHLORIDE 20 MG/ML
INJECTION, SOLUTION EPIDURAL; INFILTRATION; INTRACAUDAL; PERINEURAL AS NEEDED
Status: DISCONTINUED | OUTPATIENT
Start: 2024-05-08 | End: 2024-05-08 | Stop reason: SURG

## 2024-05-08 RX ORDER — ACETAMINOPHEN 325 MG/1
650 TABLET ORAL EVERY 4 HOURS PRN
Status: DISCONTINUED | OUTPATIENT
Start: 2024-05-08 | End: 2024-05-08 | Stop reason: HOSPADM

## 2024-05-08 RX ORDER — SODIUM CHLORIDE 9 MG/ML
30 INJECTION, SOLUTION INTRAVENOUS CONTINUOUS PRN
OUTPATIENT
Start: 2024-05-08

## 2024-05-08 RX ORDER — ACETAMINOPHEN 325 MG/1
650 TABLET ORAL EVERY 4 HOURS PRN
OUTPATIENT
Start: 2024-05-08

## 2024-05-08 RX ORDER — LISINOPRIL 10 MG/1
10 TABLET ORAL DAILY
COMMUNITY

## 2024-05-08 RX ORDER — DEXTROSE MONOHYDRATE 25 G/50ML
10-50 INJECTION, SOLUTION INTRAVENOUS
OUTPATIENT
Start: 2024-05-08

## 2024-05-08 RX ORDER — CHLORHEXIDINE GLUCONATE 500 MG/1
CLOTH TOPICAL EVERY 12 HOURS PRN
OUTPATIENT
Start: 2024-05-08

## 2024-05-08 RX ORDER — SODIUM CHLORIDE 9 MG/ML
40 INJECTION, SOLUTION INTRAVENOUS AS NEEDED
OUTPATIENT
Start: 2024-05-08

## 2024-05-08 RX ORDER — NICOTINE POLACRILEX 4 MG
15 LOZENGE BUCCAL
OUTPATIENT
Start: 2024-05-08

## 2024-05-08 RX ADMIN — PROPOFOL 20 MG: 10 INJECTION, EMULSION INTRAVENOUS at 10:18

## 2024-05-08 RX ADMIN — PROPOFOL 20 MG: 10 INJECTION, EMULSION INTRAVENOUS at 10:17

## 2024-05-08 RX ADMIN — PROPOFOL 20 MG: 10 INJECTION, EMULSION INTRAVENOUS at 10:19

## 2024-05-08 RX ADMIN — PROPOFOL 20 MG: 10 INJECTION, EMULSION INTRAVENOUS at 10:15

## 2024-05-08 RX ADMIN — LIDOCAINE HYDROCHLORIDE 50 MG: 20 INJECTION, SOLUTION EPIDURAL; INFILTRATION; INTRACAUDAL; PERINEURAL at 10:13

## 2024-05-08 RX ADMIN — SODIUM CHLORIDE: 9 INJECTION, SOLUTION INTRAVENOUS at 10:06

## 2024-05-08 RX ADMIN — PROPOFOL 100 MG: 10 INJECTION, EMULSION INTRAVENOUS at 10:13

## 2024-05-08 NOTE — PROGRESS NOTES
Appointment made for patient to see Dr. Grier in the [place] office on 5/16/24 at 11:00 am for surgical evaluation of MR/CAD.

## 2024-05-08 NOTE — CONSULTS
Patient Care Team:  Alexandria Tubbs MD as PCP - General  Lenny Gilman MD as Consulting Physician (Cardiology)    Chief complaint: Heart murmur    Subjective     History of Present Illness:   Mr. Alvarado is an 83 year-old male with PMH of HTN, HLD, and DM who has been followed by Cardiology for the past 10 years with a heart murmur. He was recently seen in the office by Dr. Gilman on 4/24/24 and an echocardiogram was performed showing progression of his mitral valve regurgitation to severe. He presented today for elective LEO and cardiac catheterization. LEO revealed severe mitral regurgitation and cardiac catheterization revealed single vessel CAD. Cardiac Surgery has been consulted for surgical evaluation. Patient reports increased fatigue and occasional dyspnea on exertion over the past few months, but is still very active. He chops wood and mows his 2 acre lawn with a self propelled lawnmower. He denies chest pain.         Review of Systems   Constitutional:  Positive for fatigue. Negative for activity change.   Respiratory:  Positive for shortness of breath.    Cardiovascular:  Negative for chest pain.   All other systems reviewed and are negative.       Past Medical History:   Diagnosis Date    Diabetes mellitus     Heart murmur     Heart valve disease     Hypertension      Past Surgical History:   Procedure Laterality Date    GALLBLADDER SURGERY  2002     Family History   Problem Relation Age of Onset    No Known Problems Mother     Heart attack Father     No Known Problems Sister     No Known Problems Brother     No Known Problems Maternal Aunt     No Known Problems Maternal Uncle     No Known Problems Paternal Aunt     No Known Problems Paternal Uncle     No Known Problems Maternal Grandmother     No Known Problems Maternal Grandfather     No Known Problems Paternal Grandmother     No Known Problems Paternal Grandfather     No Known Problems Other     Anemia Neg Hx     Arrhythmia Neg Hx     Asthma Neg  "Hx     Clotting disorder Neg Hx     Fainting Neg Hx     Heart disease Neg Hx     Heart failure Neg Hx     Hyperlipidemia Neg Hx     Hypertension Neg Hx      Social History     Tobacco Use    Smoking status: Never     Passive exposure: Never    Smokeless tobacco: Never   Vaping Use    Vaping status: Never Used   Substance Use Topics    Alcohol use: Yes     Alcohol/week: 2.0 standard drinks of alcohol     Types: 1 Glasses of wine, 1 Cans of beer per week     Comment: occ     Drug use: Never     No medications prior to admission.        Allergies:  Patient has no known allergies.    Objective      Vital Signs  Temp:  [98.1 °F (36.7 °C)] 98.1 °F (36.7 °C)  Heart Rate:  [52-64] 59  Resp:  [12-19] 18  BP: (106-150)/(50-84) 150/82    Flowsheet Rows      Flowsheet Row First Filed Value   Admission Height 185.4 cm (73\") Documented at 05/08/2024 0847   Admission Weight 79.9 kg (176 lb 2.4 oz) Documented at 05/08/2024 0847          185.4 cm (73\")    Physical Exam  Vitals and nursing note reviewed.   Constitutional:       General: He is not in acute distress.     Appearance: Normal appearance. He is normal weight. He is not ill-appearing, toxic-appearing or diaphoretic.      Comments: Appears younger than stated age   HENT:      Head: Normocephalic and atraumatic.      Nose: Nose normal.      Mouth/Throat:      Mouth: Mucous membranes are moist. Mucous membranes are dry.      Pharynx: Oropharynx is clear.   Eyes:      Extraocular Movements: Extraocular movements intact.      Conjunctiva/sclera: Conjunctivae normal.      Pupils: Pupils are equal, round, and reactive to light.   Cardiovascular:      Rate and Rhythm: Normal rate and regular rhythm.      Heart sounds: Murmur (III/VI systolic murmur) heard.   Pulmonary:      Effort: Pulmonary effort is normal. No respiratory distress.      Breath sounds: Normal breath sounds. No wheezing or rales.   Abdominal:      General: Abdomen is flat. Bowel sounds are normal.      Palpations: " Abdomen is soft.   Musculoskeletal:         General: Normal range of motion.      Cervical back: Normal range of motion and neck supple.      Right lower leg: No edema.      Left lower leg: No edema.   Skin:     General: Skin is warm and dry.   Neurological:      General: No focal deficit present.      Mental Status: He is alert and oriented to person, place, and time.   Psychiatric:         Mood and Affect: Mood normal.         Behavior: Behavior normal.         Thought Content: Thought content normal.         Judgment: Judgment normal.         Results Review:   Lab Results (last 24 hours)       ** No results found for the last 24 hours. **          TRANSESOPHAGEAL ECHOCARDIOGRAM 5/8/24:  Official report pending       CARDIAC CATHETERIZATION 5/8/24:    LVEDP:8 Estimated EF %:60    Initial Aortic Pressure: 120/70    AV Gradient: No gradient    Rt. Heart Pressure:    Wall Motion:   Dominance: [ ] Left [ x] Right [ ] Co-Dominant     Coronary Arteriography: (Please Code highest degree of stenosis)    Left Main %: 0  Proximal LAD %: 0  Mid/Distal LAD %: 80 to 90% mid in the origin of the diagonal branch  LCX %: 0   Ramus:   RCA %: 0   Lima %:   SVG(s) %:       TRANSTHORACIC ECHOCARDIOGRAM 4/24/24:      Left ventricular ejection fraction appears to be 56 - 60%.    Left ventricular diastolic function is consistent with (grade I) impaired relaxation.    Severe mitral valve regurgitation is present.    Estimated right ventricular systolic pressure from tricuspid regurgitation is normal (<35 mmHg).      Assessment & Plan    Severe mitral regurgitation  Single vessel CAD  Normal LV systolic function---EF 56-60%  HTN  HLD  DM type 2      Patient seen and evaluated by Dr. Grier who recommends mitral valve repair/replacement and CABG. The nature of the procedure, risks, and benefits were discussed with the patient and his wife and the patient wishes to proceed with surgery. Will plan for surgery next week, likely 5/17/24. Our  office will call patient tomorrow to make arrangements. We will cancel his office appointment since he was able to be seen today prior to discharge. He was provided with Dr. Grier's business card and instructed to call us with any questions or concerns. He was also instructed to take it easy over the next week and avoid strenuous activity.        ROBERT Auguste  05/08/24  15:56 EDT

## 2024-05-09 ENCOUNTER — TELEPHONE (OUTPATIENT)
Dept: CARDIAC SURGERY | Facility: CLINIC | Age: 84
End: 2024-05-09
Payer: MEDICARE

## 2024-05-09 PROBLEM — I25.10 CAD (CORONARY ARTERY DISEASE): Status: ACTIVE | Noted: 2024-05-08

## 2024-05-09 NOTE — PAT
Spoke with  Christiano and went over PAT time, told to stop all meds that are OTC and vitamins,supplements. Pt verbalized understanding of all instructions.

## 2024-05-13 ENCOUNTER — TELEPHONE (OUTPATIENT)
Dept: CARDIAC SURGERY | Facility: CLINIC | Age: 84
End: 2024-05-13
Payer: MEDICARE

## 2024-05-13 RX ORDER — CHLORHEXIDINE GLUCONATE ORAL RINSE 1.2 MG/ML
15 SOLUTION DENTAL EVERY 12 HOURS
Status: DISPENSED | OUTPATIENT
Start: 2024-05-13 | End: 2024-05-14

## 2024-05-13 RX ORDER — CHLORHEXIDINE GLUCONATE 500 MG/1
CLOTH TOPICAL EVERY 12 HOURS PRN
Status: DISCONTINUED | OUTPATIENT
Start: 2024-05-13 | End: 2024-05-17

## 2024-05-14 ENCOUNTER — HOSPITAL ENCOUNTER (OUTPATIENT)
Dept: RESPIRATORY THERAPY | Facility: HOSPITAL | Age: 84
Discharge: HOME | End: 2024-05-14
Payer: MEDICARE

## 2024-05-14 ENCOUNTER — PRE-ADMISSION TESTING (OUTPATIENT)
Dept: PREADMISSION TESTING | Facility: HOSPITAL | Age: 84
DRG: 219 | End: 2024-05-14
Payer: MEDICARE

## 2024-05-14 ENCOUNTER — LAB (OUTPATIENT)
Dept: LAB | Facility: HOSPITAL | Age: 84
End: 2024-05-14
Payer: MEDICARE

## 2024-05-14 ENCOUNTER — HOSPITAL ENCOUNTER (OUTPATIENT)
Dept: CARDIOLOGY | Facility: HOSPITAL | Age: 84
Discharge: HOME | End: 2024-05-14
Payer: MEDICARE

## 2024-05-14 ENCOUNTER — HOSPITAL ENCOUNTER (OUTPATIENT)
Dept: GENERAL RADIOLOGY | Facility: HOSPITAL | Age: 84
Discharge: HOME | End: 2024-05-14
Payer: MEDICARE

## 2024-05-14 VITALS
DIASTOLIC BLOOD PRESSURE: 60 MMHG | HEIGHT: 73 IN | WEIGHT: 177 LBS | SYSTOLIC BLOOD PRESSURE: 114 MMHG | TEMPERATURE: 97.9 F | BODY MASS INDEX: 23.46 KG/M2 | HEART RATE: 59 BPM | RESPIRATION RATE: 16 BRPM | OXYGEN SATURATION: 99 %

## 2024-05-14 DIAGNOSIS — I05.9 MITRAL VALVE DISEASE: ICD-10-CM

## 2024-05-14 DIAGNOSIS — I25.10 CAD (CORONARY ARTERY DISEASE): ICD-10-CM

## 2024-05-14 DIAGNOSIS — R79.1 ABNORMAL COAGULATION PROFILE: ICD-10-CM

## 2024-05-14 DIAGNOSIS — R79.9 ABNORMAL FINDING OF BLOOD CHEMISTRY, UNSPECIFIED: ICD-10-CM

## 2024-05-14 DIAGNOSIS — Z01.810 PREOP CARDIOVASCULAR EXAM: ICD-10-CM

## 2024-05-14 DIAGNOSIS — Z01.818 PREOPERATIVE TESTING: ICD-10-CM

## 2024-05-14 DIAGNOSIS — R09.89 OTHER SPECIFIED SYMPTOMS AND SIGNS INVOLVING THE CIRCULATORY AND RESPIRATORY SYSTEMS: ICD-10-CM

## 2024-05-14 LAB
ABO GROUP BLD: NORMAL
ALBUMIN SERPL-MCNC: 4.5 G/DL (ref 3.5–5.2)
ALBUMIN/GLOB SERPL: 1.7 G/DL
ALP SERPL-CCNC: 71 U/L (ref 39–117)
ALT SERPL W P-5'-P-CCNC: 17 U/L (ref 1–41)
ANION GAP SERPL CALCULATED.3IONS-SCNC: 8 MMOL/L (ref 5–15)
APTT PPP: 27.8 SECONDS (ref 24–31)
ARTERIAL PATENCY WRIST A: POSITIVE
AST SERPL-CCNC: 17 U/L (ref 1–40)
ATMOSPHERIC PRESS: NORMAL MM[HG]
BASE EXCESS BLDA CALC-SCNC: 1 MMOL/L (ref 0–3)
BASOPHILS # BLD AUTO: 0.04 10*3/MM3 (ref 0–0.2)
BASOPHILS NFR BLD AUTO: 0.8 % (ref 0–1.5)
BDY SITE: NORMAL
BH CV XLRA MEAS - DIST GSV THIGH DIST LEFT: 0.47 CM
BH CV XLRA MEAS - DIST GSV THIGH DIST RIGHT: 0.4 CM
BH CV XLRA MEAS - GSV ANKLE DIST LEFT: 0.35 CM
BH CV XLRA MEAS - GSV ANKLE DIST RIGHT: 0.29 CM
BH CV XLRA MEAS - MID GSV CALF LEFT: 0.39 CM
BH CV XLRA MEAS - MID GSV CALF RIGHT: 0.29 CM
BH CV XLRA MEAS - MID GSV THIGH  LEFT: 0.37 CM
BH CV XLRA MEAS - MID GSV THIGH  RIGHT: 0.35 CM
BH CV XLRA MEAS - PROX GSV CALF DIST LEFT: 0.41 CM
BH CV XLRA MEAS - PROX GSV CALF DIST RIGHT: 0.4 CM
BH CV XLRA MEAS - PROX GSV THIGH  LEFT: 0.52 CM
BH CV XLRA MEAS - PROX GSV THIGH  RIGHT: 0.86 CM
BH CV XLRA MEAS LEFT DIST CCA EDV: -11 CM/SEC
BH CV XLRA MEAS LEFT DIST CCA PSV: -80.1 CM/SEC
BH CV XLRA MEAS LEFT DIST ICA EDV: -31.1 CM/SEC
BH CV XLRA MEAS LEFT DIST ICA PSV: -112 CM/SEC
BH CV XLRA MEAS LEFT ICA/CCA RATIO: 0.8
BH CV XLRA MEAS LEFT PROX CCA EDV: 19.1 CM/SEC
BH CV XLRA MEAS LEFT PROX CCA PSV: 137 CM/SEC
BH CV XLRA MEAS LEFT PROX ECA PSV: -122 CM/SEC
BH CV XLRA MEAS LEFT PROX ICA EDV: -21.1 CM/SEC
BH CV XLRA MEAS LEFT PROX ICA PSV: -75.7 CM/SEC
BH CV XLRA MEAS LEFT PROX SCLA PSV: 118 CM/SEC
BH CV XLRA MEAS LEFT VERTEBRAL A PSV: 65.9 CM/SEC
BH CV XLRA MEAS RIGHT DIST CCA EDV: -19.6 CM/SEC
BH CV XLRA MEAS RIGHT DIST CCA PSV: -86.9 CM/SEC
BH CV XLRA MEAS RIGHT DIST ICA EDV: -27.3 CM/SEC
BH CV XLRA MEAS RIGHT DIST ICA PSV: -82 CM/SEC
BH CV XLRA MEAS RIGHT ICA/CCA RATIO: 0.8
BH CV XLRA MEAS RIGHT PROX CCA EDV: -12.6 CM/SEC
BH CV XLRA MEAS RIGHT PROX CCA PSV: -116 CM/SEC
BH CV XLRA MEAS RIGHT PROX ECA PSV: -93.4 CM/SEC
BH CV XLRA MEAS RIGHT PROX ICA EDV: -23 CM/SEC
BH CV XLRA MEAS RIGHT PROX ICA PSV: -96.6 CM/SEC
BH CV XLRA MEAS RIGHT PROX SCLA PSV: 103 CM/SEC
BH CV XLRA MEAS RIGHT VERTEBRAL A PSV: -60.4 CM/SEC
BILIRUB SERPL-MCNC: 2.2 MG/DL (ref 0–1.2)
BILIRUB UR QL STRIP: NEGATIVE
BLD GP AB SCN SERPL QL: NEGATIVE
BUN SERPL-MCNC: 14 MG/DL (ref 8–23)
BUN/CREAT SERPL: 15.1 (ref 7–25)
CALCIUM SPEC-SCNC: 9.6 MG/DL (ref 8.6–10.5)
CHLORIDE SERPL-SCNC: 104 MMOL/L (ref 98–107)
CHOLEST SERPL-MCNC: 156 MG/DL (ref 0–200)
CLARITY UR: CLEAR
CLOSE TME COLL+ADP + EPINEP PNL BLD: 93 % (ref 86–100)
CO2 BLDA-SCNC: 27 MMOL/L (ref 22–29)
CO2 SERPL-SCNC: 27 MMOL/L (ref 22–29)
COLOR UR: YELLOW
CREAT SERPL-MCNC: 0.93 MG/DL (ref 0.76–1.27)
DEPRECATED RDW RBC AUTO: 43.1 FL (ref 37–54)
EGFRCR SERPLBLD CKD-EPI 2021: 82.5 ML/MIN/1.73
EOSINOPHIL # BLD AUTO: 0.34 10*3/MM3 (ref 0–0.4)
EOSINOPHIL NFR BLD AUTO: 6.4 % (ref 0.3–6.2)
ERYTHROCYTE [DISTWIDTH] IN BLOOD BY AUTOMATED COUNT: 12.4 % (ref 12.3–15.4)
GLOBULIN UR ELPH-MCNC: 2.7 GM/DL
GLUCOSE SERPL-MCNC: 154 MG/DL (ref 65–99)
GLUCOSE UR STRIP-MCNC: NEGATIVE MG/DL
HBA1C MFR BLD: 7 % (ref 4.8–5.6)
HCO3 BLDA-SCNC: 25.8 MMOL/L (ref 21–28)
HCT VFR BLD AUTO: 40.7 % (ref 37.5–51)
HDLC SERPL-MCNC: 52 MG/DL (ref 40–60)
HEMODILUTION: NO
HGB BLD-MCNC: 13.3 G/DL (ref 13–17.7)
HGB UR QL STRIP.AUTO: NEGATIVE
IMM GRANULOCYTES # BLD AUTO: 0.01 10*3/MM3 (ref 0–0.05)
IMM GRANULOCYTES NFR BLD AUTO: 0.2 % (ref 0–0.5)
INHALED O2 CONCENTRATION: 21 %
INR PPP: 0.94 (ref 0.93–1.1)
KETONES UR QL STRIP: ABNORMAL
LDLC SERPL CALC-MCNC: 95 MG/DL (ref 0–100)
LDLC/HDLC SERPL: 1.84 {RATIO}
LEUKOCYTE ESTERASE UR QL STRIP.AUTO: NEGATIVE
LYMPHOCYTES # BLD AUTO: 1.09 10*3/MM3 (ref 0.7–3.1)
LYMPHOCYTES NFR BLD AUTO: 20.5 % (ref 19.6–45.3)
MCH RBC QN AUTO: 30.5 PG (ref 26.6–33)
MCHC RBC AUTO-ENTMCNC: 32.7 G/DL (ref 31.5–35.7)
MCV RBC AUTO: 93.3 FL (ref 79–97)
MODALITY: NORMAL
MONOCYTES # BLD AUTO: 0.42 10*3/MM3 (ref 0.1–0.9)
MONOCYTES NFR BLD AUTO: 7.9 % (ref 5–12)
MRSA DNA SPEC QL NAA+PROBE: NORMAL
NEUTROPHILS NFR BLD AUTO: 3.41 10*3/MM3 (ref 1.7–7)
NEUTROPHILS NFR BLD AUTO: 64.2 % (ref 42.7–76)
NITRITE UR QL STRIP: NEGATIVE
NRBC BLD AUTO-RTO: 0 /100 WBC (ref 0–0.2)
PCO2 BLDA: 40.7 MM HG (ref 35–48)
PH BLDA: 7.41 PH UNITS (ref 7.35–7.45)
PH UR STRIP.AUTO: 6.5 [PH] (ref 5–8)
PLATELET # BLD AUTO: 191 10*3/MM3 (ref 140–450)
PMV BLD AUTO: 9.4 FL (ref 6–12)
PO2 BLDA: 86 MM HG (ref 83–108)
POTASSIUM SERPL-SCNC: 4.4 MMOL/L (ref 3.5–5.2)
PROT SERPL-MCNC: 7.2 G/DL (ref 6–8.5)
PROT UR QL STRIP: NEGATIVE
PROTHROMBIN TIME: 10.3 SECONDS (ref 9.6–11.7)
QT INTERVAL: 411 MS
QTC INTERVAL: 419 MS
RBC # BLD AUTO: 4.36 10*6/MM3 (ref 4.14–5.8)
RH BLD: NEGATIVE
SAO2 % BLDCOA: 96.6 % (ref 94–98)
SARS-COV-2 RNA RESP QL NAA+PROBE: NOT DETECTED
SODIUM SERPL-SCNC: 139 MMOL/L (ref 136–145)
SP GR UR STRIP: 1.01 (ref 1–1.03)
T&S EXPIRATION DATE: NORMAL
TRIGL SERPL-MCNC: 42 MG/DL (ref 0–150)
UROBILINOGEN UR QL STRIP: ABNORMAL
VLDLC SERPL-MCNC: 9 MG/DL (ref 5–40)
WBC NRBC COR # BLD AUTO: 5.31 10*3/MM3 (ref 3.4–10.8)

## 2024-05-14 PROCEDURE — 93880 EXTRACRANIAL BILAT STUDY: CPT

## 2024-05-14 PROCEDURE — 85025 COMPLETE CBC W/AUTO DIFF WBC: CPT

## 2024-05-14 PROCEDURE — 85576 BLOOD PLATELET AGGREGATION: CPT

## 2024-05-14 PROCEDURE — 82803 BLOOD GASES ANY COMBINATION: CPT | Performed by: THORACIC SURGERY (CARDIOTHORACIC VASCULAR SURGERY)

## 2024-05-14 PROCEDURE — 86900 BLOOD TYPING SEROLOGIC ABO: CPT

## 2024-05-14 PROCEDURE — 80061 LIPID PANEL: CPT

## 2024-05-14 PROCEDURE — 93005 ELECTROCARDIOGRAM TRACING: CPT | Performed by: NURSE PRACTITIONER

## 2024-05-14 PROCEDURE — 85610 PROTHROMBIN TIME: CPT

## 2024-05-14 PROCEDURE — 80053 COMPREHEN METABOLIC PANEL: CPT

## 2024-05-14 PROCEDURE — 93970 EXTREMITY STUDY: CPT

## 2024-05-14 PROCEDURE — 87635 SARS-COV-2 COVID-19 AMP PRB: CPT

## 2024-05-14 PROCEDURE — 83036 HEMOGLOBIN GLYCOSYLATED A1C: CPT

## 2024-05-14 PROCEDURE — 36600 WITHDRAWAL OF ARTERIAL BLOOD: CPT | Performed by: THORACIC SURGERY (CARDIOTHORACIC VASCULAR SURGERY)

## 2024-05-14 PROCEDURE — 86901 BLOOD TYPING SEROLOGIC RH(D): CPT

## 2024-05-14 PROCEDURE — 86923 COMPATIBILITY TEST ELECTRIC: CPT

## 2024-05-14 PROCEDURE — 36415 COLL VENOUS BLD VENIPUNCTURE: CPT

## 2024-05-14 PROCEDURE — 87641 MR-STAPH DNA AMP PROBE: CPT

## 2024-05-14 PROCEDURE — 85730 THROMBOPLASTIN TIME PARTIAL: CPT

## 2024-05-14 PROCEDURE — 71046 X-RAY EXAM CHEST 2 VIEWS: CPT

## 2024-05-14 PROCEDURE — 86850 RBC ANTIBODY SCREEN: CPT

## 2024-05-14 PROCEDURE — 81003 URINALYSIS AUTO W/O SCOPE: CPT

## 2024-05-14 NOTE — H&P
ADDENDUM     Patient Care Team:  Alexandria Tubbs MD as PCP - General  Lenny Gilman MD as Consulting Physician (Cardiology)     Chief complaint: Heart murmur        Subjective  History of Present Illness:              Mr. Alvarado is an 83 year-old male with PMH of HTN, HLD, and DM who has been followed by Cardiology for the past 10 years with a heart murmur. He was recently seen in the office by Dr. Gilman on 4/24/24 and an echocardiogram was performed showing progression of his mitral valve regurgitation to severe. He presented today for elective LEO and cardiac catheterization. LEO revealed severe mitral regurgitation and cardiac catheterization revealed single vessel CAD. Cardiac Surgery has been consulted for surgical evaluation. Patient reports increased fatigue and occasional dyspnea on exertion over the past few months, but is still very active. He chops wood and mows his 2 acre lawn with a self propelled lawnmower. He denies chest pain.          Review of Systems   Constitutional:  Positive for fatigue. Negative for activity change.   Respiratory:  Positive for shortness of breath.    Cardiovascular:  Negative for chest pain.   All other systems reviewed and are negative.        Medical History        Past Medical History:   Diagnosis Date    Diabetes mellitus      Heart murmur      Heart valve disease      Hypertension           Surgical History         Past Surgical History:   Procedure Laterality Date    GALLBLADDER SURGERY   2002               Family History   Problem Relation Age of Onset    No Known Problems Mother      Heart attack Father      No Known Problems Sister      No Known Problems Brother      No Known Problems Maternal Aunt      No Known Problems Maternal Uncle      No Known Problems Paternal Aunt      No Known Problems Paternal Uncle      No Known Problems Maternal Grandmother      No Known Problems Maternal Grandfather      No Known Problems Paternal Grandmother      No Known Problems  "Paternal Grandfather      No Known Problems Other      Anemia Neg Hx      Arrhythmia Neg Hx      Asthma Neg Hx      Clotting disorder Neg Hx      Fainting Neg Hx      Heart disease Neg Hx      Heart failure Neg Hx      Hyperlipidemia Neg Hx      Hypertension Neg Hx        Social History   Social History            Tobacco Use    Smoking status: Never       Passive exposure: Never    Smokeless tobacco: Never   Vaping Use    Vaping status: Never Used   Substance Use Topics    Alcohol use: Yes       Alcohol/week: 2.0 standard drinks of alcohol       Types: 1 Glasses of wine, 1 Cans of beer per week       Comment: occ     Drug use: Never         Prescriptions Prior to Admission   No medications prior to admission.         Scheduled Medication         Allergies:  Patient has no known allergies.           Objective[]Expand by Default  Vital Signs  Temp:  [98.1 °F (36.7 °C)] 98.1 °F (36.7 °C)  Heart Rate:  [52-64] 59  Resp:  [12-19] 18  BP: (106-150)/(50-84) 150/82     Flowsheet Rows       Flowsheet Row First Filed Value   Admission Height 185.4 cm (73\") Documented at 05/08/2024 0847   Admission Weight 79.9 kg (176 lb 2.4 oz) Documented at 05/08/2024 0847             185.4 cm (73\")     Physical Exam  Vitals and nursing note reviewed.   Constitutional:       General: He is not in acute distress.     Appearance: Normal appearance. He is normal weight. He is not ill-appearing, toxic-appearing or diaphoretic.      Comments: Appears younger than stated age   HENT:      Head: Normocephalic and atraumatic.      Nose: Nose normal.      Mouth/Throat:      Mouth: Mucous membranes are moist. Mucous membranes are dry.      Pharynx: Oropharynx is clear.   Eyes:      Extraocular Movements: Extraocular movements intact.      Conjunctiva/sclera: Conjunctivae normal.      Pupils: Pupils are equal, round, and reactive to light.   Cardiovascular:      Rate and Rhythm: Normal rate and regular rhythm.      Heart sounds: Murmur (III/VI systolic " murmur) heard.   Pulmonary:      Effort: Pulmonary effort is normal. No respiratory distress.      Breath sounds: Normal breath sounds. No wheezing or rales.   Abdominal:      General: Abdomen is flat. Bowel sounds are normal.      Palpations: Abdomen is soft.   Musculoskeletal:         General: Normal range of motion.      Cervical back: Normal range of motion and neck supple.      Right lower leg: No edema.      Left lower leg: No edema.   Skin:     General: Skin is warm and dry.   Neurological:      General: No focal deficit present.      Mental Status: He is alert and oriented to person, place, and time.   Psychiatric:         Mood and Affect: Mood normal.         Behavior: Behavior normal.         Thought Content: Thought content normal.         Judgment: Judgment normal.            Results Review:   Lab Results (last 24 hours)         ** No results found for the last 24 hours. **             TRANSESOPHAGEAL ECHOCARDIOGRAM 5/8/24:  Official report pending         CARDIAC CATHETERIZATION 5/8/24:     LVEDP:8 Estimated EF %:60    Initial Aortic Pressure: 120/70    AV Gradient: No gradient    Rt. Heart Pressure:    Wall Motion:   Dominance: [ ] Left [ x] Right [ ] Co-Dominant     Coronary Arteriography: (Please Code highest degree of stenosis)    Left Main %: 0  Proximal LAD %: 0  Mid/Distal LAD %: 80 to 90% mid in the origin of the diagonal branch  LCX %: 0   Ramus:   RCA %: 0   Lima %:   SVG(s) %:         TRANSTHORACIC ECHOCARDIOGRAM 4/24/24:       Left ventricular ejection fraction appears to be 56 - 60%.    Left ventricular diastolic function is consistent with (grade I) impaired relaxation.    Severe mitral valve regurgitation is present.    Estimated right ventricular systolic pressure from tricuspid regurgitation is normal (<35 mmHg).        Assessment & Plan     Severe mitral regurgitation  Single vessel CAD  Normal LV systolic function---EF 56-60%  HTN  HLD  DM type 2        Patient seen and evaluated by  Dr. Grier who recommends mitral valve repair/replacement and CABG. The nature of the procedure, risks, and benefits were discussed with the patient and his wife and the patient wishes to proceed with surgery. Will plan for surgery next week, likely 5/17/24. Our office will call patient tomorrow to make arrangements. We will cancel his office appointment since he was able to be seen today prior to discharge. He was provided with Dr. Grier's business card and instructed to call us with any questions or concerns. He was also instructed to take it easy over the next week and avoid strenuous activity.          ROBERT Auguste  05/08/24  15:56 EDT    ADDENDUM     Saw patient for the first time today in PAT, I answered all questions/concerns with verbalized understanding. Patient denies any recent illness, rash, fever, mouth infection, sore gums, or lesion. Patient denies hx of smoking, kidney disease, pulmonary disease, alcohol use, and denies taking anticoagulant. Patient to not take ay medications and NPO after midnight day of surgery. Patient reports his symptoms are the same as above. Pre-op studies reviewed and listed below.     Covid-19 -- neg   Hemoglobin A1c -- 7.0   Lipid panel -- WNL   MRSA -- neg   Urinalysis -- positive for ketones   EKG -- SR   Carotid duplex US -- without significant disease   PT/PTT/INR -- WNL   Platelet aggregation -- 93% (plt ct of 191)    Tentative plan for CABGx1 with Lima to LAD, mitral valve repair/replacement with Dr. Grier Friday morning.    Joseph Noe PA-C  5/14/2024

## 2024-05-16 ENCOUNTER — ANESTHESIA EVENT (OUTPATIENT)
Dept: PERIOP | Facility: HOSPITAL | Age: 84
End: 2024-05-16
Payer: MEDICARE

## 2024-05-16 NOTE — ANESTHESIA PREPROCEDURE EVALUATION
Anesthesia Evaluation     Patient summary reviewed and Nursing notes reviewed   NPO Solid Status: > 8 hours  NPO Liquid Status: > 8 hours           Airway   Mallampati: II  TM distance: >3 FB  Neck ROM: full  No difficulty expected  Dental - normal exam     Pulmonary - normal exam   Cardiovascular - normal exam    ECG reviewed    (+) hypertension, valvular problems/murmurs MR, CAD, hyperlipidemia      Neuro/Psych  GI/Hepatic/Renal/Endo    (+) diabetes mellitus    Musculoskeletal     Abdominal  - normal exam    Bowel sounds: normal.   Substance History      OB/GYN          Other        ROS/Med Hx Other: SR     Left Main %: 0  Proximal LAD %: 0  Mid/Distal LAD %: 80 to 90% mid in the origin of the diagonal branch  LCX %: 0   Ramus:   RCA %: 0   Lima %:   SVG(s) %:       ·  Left ventricular ejection fraction appears to be 61 - 65%.  ·  Saline test results are negative.  ·  There are myxomatous changes of the mitral valve apparatus present.  ·  Severe mitral valve regurgitation is present.    FINDINGS:  No focal or diffuse pulmonary infiltrate is identified. No pleural  effusion or pneumothorax is seen.  Heart size and mediastinal contour  appear within normal limits. Mild degenerative changes in the thoracic  spine.     IMPRESSION:  No radiographic findings of acute cardiopulmonary abnormality.                          Anesthesia Plan    ASA 4     general, Camille, CVL and PAC     intravenous induction     Anesthetic plan, risks, benefits, and alternatives have been provided, discussed and informed consent has been obtained with: patient.        CODE STATUS:

## 2024-05-17 ENCOUNTER — APPOINTMENT (OUTPATIENT)
Dept: GENERAL RADIOLOGY | Facility: HOSPITAL | Age: 84
DRG: 219 | End: 2024-05-17
Payer: MEDICARE

## 2024-05-17 ENCOUNTER — ANESTHESIA (OUTPATIENT)
Dept: PERIOP | Facility: HOSPITAL | Age: 84
End: 2024-05-17
Payer: MEDICARE

## 2024-05-17 ENCOUNTER — HOSPITAL ENCOUNTER (INPATIENT)
Facility: HOSPITAL | Age: 84
LOS: 5 days | Discharge: HOME | DRG: 219 | End: 2024-05-22
Attending: THORACIC SURGERY (CARDIOTHORACIC VASCULAR SURGERY) | Admitting: THORACIC SURGERY (CARDIOTHORACIC VASCULAR SURGERY)
Payer: MEDICARE

## 2024-05-17 ENCOUNTER — OFFICE VISIT (OUTPATIENT)
Dept: PERIOP | Facility: HOSPITAL | Age: 84
DRG: 219 | End: 2024-05-17
Payer: MEDICARE

## 2024-05-17 DIAGNOSIS — I25.10 CAD (CORONARY ARTERY DISEASE): ICD-10-CM

## 2024-05-17 DIAGNOSIS — Z95.1 S/P CABG X 1: Primary | ICD-10-CM

## 2024-05-17 DIAGNOSIS — I05.9 MITRAL VALVE DISEASE: ICD-10-CM

## 2024-05-17 DIAGNOSIS — Z98.890 S/P MVR (MITRAL VALVE REPAIR): ICD-10-CM

## 2024-05-17 LAB
ACT BLD: 116 SECONDS (ref 89–137)
ACT BLD: 140 SECONDS (ref 89–137)
ACT BLD: 342 SECONDS (ref 89–137)
ACT BLD: 348 SECONDS (ref 89–137)
ACT BLD: 397 SECONDS (ref 89–137)
ACT BLD: 415 SECONDS (ref 89–137)
ALBUMIN SERPL-MCNC: 4 G/DL (ref 3.5–5.2)
ANION GAP SERPL CALCULATED.3IONS-SCNC: 10 MMOL/L (ref 5–15)
APTT PPP: 25.6 SECONDS (ref 24–31)
APTT PPP: 25.9 SECONDS (ref 24–31)
ARTERIAL PATENCY WRIST A: ABNORMAL
ATMOSPHERIC PRESS: ABNORMAL MM[HG]
BASE DEFICIT: -1.3 MEQ/LITER
BASE DEFICIT: ABNORMAL
BASE EXCESS BLDA CALC-SCNC: -1.2 MMOL/L (ref 0–3)
BASE EXCESS BLDA CALC-SCNC: -1.8 MMOL/L (ref 0–3)
BASE EXCESS BLDA CALC-SCNC: -1.9 MMOL/L (ref 0–3)
BASE EXCESS BLDA CALC-SCNC: -2.5 MMOL/L (ref 0–3)
BASE EXCESS BLDA CALC-SCNC: -3.1 MMOL/L (ref 0–3)
BASE EXCESS BLDA CALC-SCNC: 0.9 MMOL/L (ref 0–3)
BASE EXCESS BLDA CALC-SCNC: 2 MMOL/L (ref 0–3)
BASE EXCESS BLDA CALC-SCNC: <0 MMOL/L (ref 0–3)
BASE EXCESS BLDV CALC-SCNC: ABNORMAL MMOL/L
BDY SITE: ABNORMAL
BH BB BLOOD EXPIRATION DATE: NORMAL
BH BB BLOOD TYPE BARCODE: 6200
BH BB BLOOD TYPE BARCODE: 9500
BH BB BLOOD TYPE BARCODE: 9500
BH BB DISPENSE STATUS: NORMAL
BH BB PRODUCT CODE: NORMAL
BH BB UNIT NUMBER: NORMAL
BUN SERPL-MCNC: 11 MG/DL (ref 8–23)
BUN/CREAT SERPL: 13.8 (ref 7–25)
BURR CELLS BLD QL SMEAR: ABNORMAL
CA-I BLDA-SCNC: 0.96 MMOL/L (ref 1.12–1.32)
CA-I BLDA-SCNC: 1 MMOL/L (ref 1.12–1.32)
CA-I BLDA-SCNC: 1.05 MMOL/L (ref 1.12–1.32)
CA-I BLDA-SCNC: 1.07 MMOL/L (ref 1.12–1.32)
CA-I BLDA-SCNC: 1.16 MMOL/L (ref 1.15–1.33)
CA-I BLDA-SCNC: 1.16 MMOL/L (ref 1.15–1.33)
CA-I BLDA-SCNC: 1.17 MMOL/L (ref 1.15–1.33)
CA-I BLDA-SCNC: 1.18 MMOL/L (ref 1.15–1.33)
CA-I BLDA-SCNC: 1.18 MMOL/L (ref 1.15–1.33)
CA-I BLDA-SCNC: 1.2 MMOL/L (ref 1.12–1.32)
CA-I BLDA-SCNC: 1.25 MMOL/L (ref 1.12–1.32)
CA-I BLDA-SCNC: 1.29 MMOL/L (ref 1.12–1.32)
CA-I SERPL ISE-MCNC: 1.17 MMOL/L (ref 1.2–1.3)
CALCIUM SPEC-SCNC: 8.4 MG/DL (ref 8.6–10.5)
CHLORIDE SERPL-SCNC: 111 MMOL/L (ref 98–107)
CLOSE TME COLL+ADP + EPINEP PNL BLD: 95 % (ref 86–100)
CO2 BLDA-SCNC: 25 MMOL/L (ref 23–27)
CO2 BLDA-SCNC: 25 MMOL/L (ref 23–27)
CO2 BLDA-SCNC: 25.3 MMOL/L (ref 22–29)
CO2 BLDA-SCNC: 26 MMOL/L (ref 23–27)
CO2 BLDA-SCNC: 29 MMOL/L (ref 23–27)
CO2 CONTENT VENOUS: ABNORMAL
CO2 SERPL-SCNC: 24 MMOL/L (ref 22–29)
CREAT BLDA-MCNC: 0.75 MG/DL (ref 0.6–1.3)
CREAT BLDA-MCNC: 0.77 MG/DL (ref 0.6–1.3)
CREAT BLDA-MCNC: 0.77 MG/DL (ref 0.6–1.3)
CREAT BLDA-MCNC: 0.83 MG/DL (ref 0.6–1.3)
CREAT BLDA-MCNC: 0.84 MG/DL (ref 0.6–1.3)
CREAT SERPL-MCNC: 0.8 MG/DL (ref 0.76–1.27)
CROSSMATCH INTERPRETATION: NORMAL
CROSSMATCH INTERPRETATION: NORMAL
DEPRECATED RDW RBC AUTO: 41.3 FL (ref 37–54)
DEPRECATED RDW RBC AUTO: 42.2 FL (ref 37–54)
EGFRCR SERPLBLD CKD-EPI 2021: 87.6 ML/MIN/1.73
EGFRCR SERPLBLD CKD-EPI 2021: 87.9 ML/MIN/1.73
EGFRCR SERPLBLD CKD-EPI 2021: 88.9 ML/MIN/1.73
EGFRCR SERPLBLD CKD-EPI 2021: 89.9 ML/MIN/1.73
EGFRCR SERPLBLD CKD-EPI 2021: 89.9 ML/MIN/1.73
EGFRCR SERPLBLD CKD-EPI 2021: 90.7 ML/MIN/1.73
ERYTHROCYTE [DISTWIDTH] IN BLOOD BY AUTOMATED COUNT: 12.4 % (ref 12.3–15.4)
ERYTHROCYTE [DISTWIDTH] IN BLOOD BY AUTOMATED COUNT: 12.4 % (ref 12.3–15.4)
FIBRINOGEN PPP-MCNC: 194 MG/DL (ref 210–450)
GLUCOSE BLDC GLUCOMTR-MCNC: 109 MG/DL (ref 70–105)
GLUCOSE BLDC GLUCOMTR-MCNC: 123 MG/DL (ref 74–100)
GLUCOSE BLDC GLUCOMTR-MCNC: 123 MG/DL (ref 74–100)
GLUCOSE BLDC GLUCOMTR-MCNC: 126 MG/DL (ref 70–105)
GLUCOSE BLDC GLUCOMTR-MCNC: 127 MG/DL (ref 70–105)
GLUCOSE BLDC GLUCOMTR-MCNC: 131 MG/DL (ref 70–105)
GLUCOSE BLDC GLUCOMTR-MCNC: 131 MG/DL (ref 70–105)
GLUCOSE BLDC GLUCOMTR-MCNC: 133 MG/DL (ref 70–105)
GLUCOSE BLDC GLUCOMTR-MCNC: 136 MG/DL (ref 70–105)
GLUCOSE BLDC GLUCOMTR-MCNC: 145 MG/DL (ref 70–105)
GLUCOSE BLDC GLUCOMTR-MCNC: 154 MG/DL (ref 70–105)
GLUCOSE BLDC GLUCOMTR-MCNC: 157 MG/DL (ref 70–105)
GLUCOSE BLDC GLUCOMTR-MCNC: 163 MG/DL (ref 70–105)
GLUCOSE BLDC GLUCOMTR-MCNC: 168 MG/DL (ref 70–105)
GLUCOSE BLDC GLUCOMTR-MCNC: 169 MG/DL (ref 70–105)
GLUCOSE BLDC GLUCOMTR-MCNC: 172 MG/DL (ref 74–100)
GLUCOSE BLDC GLUCOMTR-MCNC: 172 MG/DL (ref 74–100)
GLUCOSE BLDC GLUCOMTR-MCNC: 175 MG/DL (ref 70–105)
GLUCOSE BLDC GLUCOMTR-MCNC: 183 MG/DL (ref 74–100)
GLUCOSE BLDC GLUCOMTR-MCNC: 183 MG/DL (ref 74–100)
GLUCOSE BLDC GLUCOMTR-MCNC: 202 MG/DL (ref 74–100)
GLUCOSE BLDC GLUCOMTR-MCNC: 202 MG/DL (ref 74–100)
GLUCOSE BLDC GLUCOMTR-MCNC: 205 MG/DL (ref 74–100)
GLUCOSE BLDC GLUCOMTR-MCNC: 205 MG/DL (ref 74–100)
GLUCOSE BLDC GLUCOMTR-MCNC: 77 MG/DL (ref 70–105)
GLUCOSE SERPL-MCNC: 126 MG/DL (ref 65–99)
HCO3 BLDA-SCNC: 22 MMOL/L (ref 21–28)
HCO3 BLDA-SCNC: 22.6 MMOL/L (ref 21–28)
HCO3 BLDA-SCNC: 22.9 MMOL/L (ref 21–28)
HCO3 BLDA-SCNC: 22.9 MMOL/L (ref 21–28)
HCO3 BLDA-SCNC: 23.4 MMOL/L (ref 22–26)
HCO3 BLDA-SCNC: 24.1 MMOL/L (ref 22–26)
HCO3 BLDA-SCNC: 24.2 MMOL/L (ref 22–26)
HCO3 BLDA-SCNC: 24.3 MMOL/L (ref 22–26)
HCO3 BLDA-SCNC: 24.7 MMOL/L (ref 22–26)
HCO3 BLDA-SCNC: 25.3 MMOL/L (ref 21–28)
HCO3 BLDA-SCNC: 27.4 MMOL/L (ref 22–26)
HCO3 BLDV-SCNC: 27.2 MMOL/L (ref 23–28)
HCO3 MIXED: 24 MMOL/L (ref 21–29)
HCT VFR BLD AUTO: 29.1 % (ref 37.5–51)
HCT VFR BLD AUTO: 33.2 % (ref 37.5–51)
HCT VFR BLDA CALC: 25 % (ref 38–51)
HCT VFR BLDA CALC: 25 % (ref 38–51)
HCT VFR BLDA CALC: 26 % (ref 38–51)
HCT VFR BLDA CALC: 28 % (ref 38–51)
HCT VFR BLDA CALC: 29 % (ref 38–51)
HCT VFR BLDA CALC: 30 % (ref 38–51)
HCT VFR BLDA CALC: 30 % (ref 38–51)
HCT VFR BLDA CALC: 32 % (ref 38–51)
HCT VFR BLDA CALC: 32 % (ref 38–51)
HCT VFR BLDA CALC: 33 % (ref 38–51)
HEMODILUTION: NO
HEMODILUTION: YES
HGB BLD-MCNC: 11.1 G/DL (ref 13–17.7)
HGB BLD-MCNC: 9.6 G/DL (ref 13–17.7)
HGB BLDA-MCNC: 10.2 G/DL (ref 12–17)
HGB BLDA-MCNC: 10.2 G/DL (ref 12–17)
HGB BLDA-MCNC: 10.8 G/DL (ref 12–17)
HGB BLDA-MCNC: 11 G/DL (ref 12–17)
HGB BLDA-MCNC: 11.1 G/DL (ref 12–17)
HGB BLDA-MCNC: 11.1 G/DL (ref 12–17)
HGB BLDA-MCNC: 11.2 G/DL (ref 12–17)
HGB BLDA-MCNC: 8.5 G/DL (ref 12–17)
HGB BLDA-MCNC: 8.5 G/DL (ref 12–17)
HGB BLDA-MCNC: 8.8 G/DL (ref 12–17)
HGB BLDA-MCNC: 9.5 G/DL (ref 12–17)
HGB BLDA-MCNC: 9.9 G/DL (ref 12–17)
INHALED O2 CONCENTRATION: 36 %
INHALED O2 CONCENTRATION: 40 %
INHALED O2 CONCENTRATION: 40 %
INHALED O2 CONCENTRATION: 50 %
INHALED O2 CONCENTRATION: 50 %
INHALED O2 CONCENTRATION: 70 %
INR PPP: 1.06 (ref 0.93–1.1)
INR PPP: 1.4 (ref 0.93–1.1)
LYMPHOCYTES # BLD MANUAL: 0.62 10*3/MM3 (ref 0.7–3.1)
LYMPHOCYTES NFR BLD MANUAL: 3 % (ref 5–12)
MCH RBC QN AUTO: 30.7 PG (ref 26.6–33)
MCH RBC QN AUTO: 30.8 PG (ref 26.6–33)
MCHC RBC AUTO-ENTMCNC: 33 G/DL (ref 31.5–35.7)
MCHC RBC AUTO-ENTMCNC: 33.4 G/DL (ref 31.5–35.7)
MCV RBC AUTO: 91.7 FL (ref 79–97)
MCV RBC AUTO: 93.3 FL (ref 79–97)
METAMYELOCYTES NFR BLD MANUAL: 5 % (ref 0–0)
MODALITY: ABNORMAL
MONOCYTES # BLD: 0.31 10*3/MM3 (ref 0.1–0.9)
NEUTROPHILS # BLD AUTO: 8.85 10*3/MM3 (ref 1.7–7)
NEUTROPHILS NFR BLD MANUAL: 68 % (ref 42.7–76)
NEUTS BAND NFR BLD MANUAL: 18 % (ref 0–5)
O2 SATURATION MIXED: 73.2 %
PCO2 BLDA: 36.4 MM HG (ref 35–48)
PCO2 BLDA: 38.2 MM HG (ref 35–48)
PCO2 BLDA: 38.7 MM HG (ref 35–48)
PCO2 BLDA: 38.9 MM HG (ref 35–48)
PCO2 BLDA: 40.5 MM HG (ref 35–45)
PCO2 BLDA: 41.2 MM HG (ref 35–48)
PCO2 BLDA: 42.7 MM HG (ref 35–45)
PCO2 BLDA: 42.7 MM HG (ref 35–45)
PCO2 BLDA: 46.2 MM HG (ref 35–45)
PCO2 BLDA: 47 MM HG (ref 35–45)
PCO2 BLDA: 51.5 MM HG (ref 35–45)
PCO2 BLDV: 52.5 MM HG (ref 41–51)
PCO2 MIXED: 41.3 MMHG (ref 35–51)
PEEP RESPIRATORY: 5 CM[H2O]
PEEP RESPIRATORY: 8 CM[H2O]
PH BLDA: 7.33 PH UNITS (ref 7.35–7.45)
PH BLDA: 7.35 PH UNITS (ref 7.35–7.45)
PH BLDA: 7.35 PH UNITS (ref 7.35–7.45)
PH BLDA: 7.36 PH UNITS (ref 7.35–7.45)
PH BLDA: 7.36 PH UNITS (ref 7.35–7.45)
PH BLDA: 7.38 PH UNITS (ref 7.35–7.45)
PH BLDA: 7.38 PH UNITS (ref 7.35–7.45)
PH BLDA: 7.4 PH UNITS (ref 7.35–7.45)
PH BLDA: 7.42 PH UNITS (ref 7.35–7.45)
PH BLDV: 7.32 PH UNITS (ref 7.31–7.41)
PH MIXED: 7.37 PH UNITS (ref 7.32–7.45)
PHOSPHATE SERPL-MCNC: 1.6 MG/DL (ref 2.5–4.5)
PHOSPHATE SERPL-MCNC: 1.9 MG/DL (ref 2.5–4.5)
PHOSPHATE SERPL-MCNC: 3.5 MG/DL (ref 2.5–4.5)
PLATELET # BLD AUTO: 129 10*3/MM3 (ref 140–450)
PLATELET # BLD AUTO: 137 10*3/MM3 (ref 140–450)
PMV BLD AUTO: 9.4 FL (ref 6–12)
PMV BLD AUTO: 9.4 FL (ref 6–12)
PO2 BLDA: 108.1 MM HG (ref 83–108)
PO2 BLDA: 214.2 MM HG (ref 83–108)
PO2 BLDA: 230 MM HG (ref 80–105)
PO2 BLDA: 365 MM HG (ref 80–105)
PO2 BLDA: 415 MM HG (ref 80–105)
PO2 BLDA: 428 MM HG (ref 80–105)
PO2 BLDA: 449 MM HG (ref 80–105)
PO2 BLDA: 454 MM HG (ref 80–105)
PO2 BLDA: 96.2 MM HG (ref 83–108)
PO2 BLDA: 98.5 MM HG (ref 83–108)
PO2 BLDA: 98.7 MM HG (ref 83–108)
PO2 BLDV: 39 MM HG (ref 35–42)
PO2 MIXED: 39.8 MMHG
POTASSIUM BLDA-SCNC: 3.7 MMOL/L (ref 3.5–4.9)
POTASSIUM BLDA-SCNC: 3.7 MMOL/L (ref 3.5–4.9)
POTASSIUM BLDA-SCNC: 3.8 MMOL/L (ref 3.5–4.9)
POTASSIUM BLDA-SCNC: 3.8 MMOL/L (ref 3.5–4.9)
POTASSIUM BLDA-SCNC: 3.9 MMOL/L (ref 3.5–4.5)
POTASSIUM BLDA-SCNC: 4 MMOL/L (ref 3.5–4.5)
POTASSIUM BLDA-SCNC: 4.1 MMOL/L (ref 3.5–4.9)
POTASSIUM BLDA-SCNC: 4.1 MMOL/L (ref 3.5–4.9)
POTASSIUM BLDA-SCNC: 4.2 MMOL/L (ref 3.5–4.5)
POTASSIUM BLDA-SCNC: 4.3 MMOL/L (ref 3.5–4.5)
POTASSIUM BLDA-SCNC: 4.4 MMOL/L (ref 3.5–4.5)
POTASSIUM BLDA-SCNC: 4.9 MMOL/L (ref 3.5–4.9)
POTASSIUM SERPL-SCNC: 2.4 MMOL/L (ref 3.5–5.2)
POTASSIUM SERPL-SCNC: 3.9 MMOL/L (ref 3.5–5.2)
POTASSIUM SERPL-SCNC: 4.2 MMOL/L (ref 3.5–5.2)
PROTHROMBIN TIME: 11.5 SECONDS (ref 9.6–11.7)
PROTHROMBIN TIME: 14.9 SECONDS (ref 9.6–11.7)
PSV: 10 CMH2O
RBC # BLD AUTO: 3.12 10*6/MM3 (ref 4.14–5.8)
RBC # BLD AUTO: 3.62 10*6/MM3 (ref 4.14–5.8)
RESPIRATORY RATE: 14
SAO2 % BLDCOA: 100 % (ref 95–98)
SAO2 % BLDCOA: 97.3 % (ref 94–98)
SAO2 % BLDCOA: 97.5 % (ref 94–98)
SAO2 % BLDCOA: 97.6 % (ref 94–98)
SAO2 % BLDCOA: 98 % (ref 94–98)
SAO2 % BLDCOA: 99.8 % (ref 94–98)
SAO2 % BLDCOV: 29 % (ref 45–75)
SCAN SLIDE: NORMAL
SET MECH RESP RATE: 14
SMALL PLATELETS BLD QL SMEAR: ABNORMAL
SODIUM BLD-SCNC: 137 MMOL/L (ref 138–146)
SODIUM BLD-SCNC: 139 MMOL/L (ref 138–146)
SODIUM BLD-SCNC: 140 MMOL/L (ref 138–146)
SODIUM BLD-SCNC: 141 MMOL/L (ref 138–146)
SODIUM BLD-SCNC: 142 MMOL/L (ref 138–146)
SODIUM BLD-SCNC: 142 MMOL/L (ref 138–146)
SODIUM BLD-SCNC: 144 MMOL/L (ref 138–146)
SODIUM BLD-SCNC: 145 MMOL/L (ref 138–146)
SODIUM BLD-SCNC: 146 MMOL/L (ref 138–146)
SODIUM BLD-SCNC: 147 MMOL/L (ref 138–146)
SODIUM SERPL-SCNC: 145 MMOL/L (ref 136–145)
TOXIC GRANULATION: ABNORMAL
UNIT  ABO: NORMAL
UNIT  RH: NORMAL
VARIANT LYMPHS NFR BLD MANUAL: 6 % (ref 19.6–45.3)
VENTILATOR MODE: ABNORMAL
VENTILATOR MODE: AC
VT ON VENT VENT: 700 ML
WBC NRBC COR # BLD AUTO: 10.29 10*3/MM3 (ref 3.4–10.8)
WBC NRBC COR # BLD AUTO: 8.61 10*3/MM3 (ref 3.4–10.8)

## 2024-05-17 PROCEDURE — 82803 BLOOD GASES ANY COMBINATION: CPT

## 2024-05-17 PROCEDURE — 36430 TRANSFUSION BLD/BLD COMPNT: CPT

## 2024-05-17 PROCEDURE — 84100 ASSAY OF PHOSPHORUS: CPT | Performed by: THORACIC SURGERY (CARDIOTHORACIC VASCULAR SURGERY)

## 2024-05-17 PROCEDURE — 84132 ASSAY OF SERUM POTASSIUM: CPT

## 2024-05-17 PROCEDURE — 93318 ECHO TRANSESOPHAGEAL INTRAOP: CPT | Performed by: ANESTHESIOLOGY

## 2024-05-17 PROCEDURE — 25010000002 MAGNESIUM SULFATE IN D5W 1G/100ML (PREMIX) 1-5 GM/100ML-% SOLUTION: Performed by: NURSE PRACTITIONER

## 2024-05-17 PROCEDURE — 93005 ELECTROCARDIOGRAM TRACING: CPT | Performed by: NURSE PRACTITIONER

## 2024-05-17 PROCEDURE — 85610 PROTHROMBIN TIME: CPT | Performed by: NURSE PRACTITIONER

## 2024-05-17 PROCEDURE — 25810000003 SODIUM CHLORIDE 0.9 % SOLUTION: Performed by: ANESTHESIOLOGY

## 2024-05-17 PROCEDURE — 86927 PLASMA FRESH FROZEN: CPT

## 2024-05-17 PROCEDURE — 25010000002 MIDAZOLAM PER 1 MG: Performed by: ANESTHESIOLOGY

## 2024-05-17 PROCEDURE — 25010000002 VASOPRESSIN 20 UNIT/ML SOLUTION: Performed by: ANESTHESIOLOGY

## 2024-05-17 PROCEDURE — 25010000002 HEPARIN (PORCINE) PER 1000 UNITS: Performed by: THORACIC SURGERY (CARDIOTHORACIC VASCULAR SURGERY)

## 2024-05-17 PROCEDURE — 74018 RADEX ABDOMEN 1 VIEW: CPT

## 2024-05-17 PROCEDURE — 80051 ELECTROLYTE PANEL: CPT

## 2024-05-17 PROCEDURE — C1751 CATH, INF, PER/CENT/MIDLINE: HCPCS | Performed by: ANESTHESIOLOGY

## 2024-05-17 PROCEDURE — 85007 BL SMEAR W/DIFF WBC COUNT: CPT | Performed by: NURSE PRACTITIONER

## 2024-05-17 PROCEDURE — 85610 PROTHROMBIN TIME: CPT | Performed by: THORACIC SURGERY (CARDIOTHORACIC VASCULAR SURGERY)

## 2024-05-17 PROCEDURE — 33533 CABG ARTERIAL SINGLE: CPT | Performed by: THORACIC SURGERY (CARDIOTHORACIC VASCULAR SURGERY)

## 2024-05-17 PROCEDURE — 94799 UNLISTED PULMONARY SVC/PX: CPT

## 2024-05-17 PROCEDURE — 85730 THROMBOPLASTIN TIME PARTIAL: CPT | Performed by: NURSE PRACTITIONER

## 2024-05-17 PROCEDURE — 82948 REAGENT STRIP/BLOOD GLUCOSE: CPT

## 2024-05-17 PROCEDURE — 25010000002 MAGNESIUM SULFATE PER 500 MG OF MAGNESIUM: Performed by: ANESTHESIOLOGY

## 2024-05-17 PROCEDURE — 82803 BLOOD GASES ANY COMBINATION: CPT | Performed by: NURSE PRACTITIONER

## 2024-05-17 PROCEDURE — 25010000002 ALBUMIN HUMAN 5% PER 50 ML: Performed by: NURSE PRACTITIONER

## 2024-05-17 PROCEDURE — P9012 CRYOPRECIPITATE EACH UNIT: HCPCS

## 2024-05-17 PROCEDURE — 25010000002 ACETAMINOPHEN 10 MG/ML SOLUTION: Performed by: NURSE PRACTITIONER

## 2024-05-17 PROCEDURE — 99233 SBSQ HOSP IP/OBS HIGH 50: CPT | Performed by: INTERNAL MEDICINE

## 2024-05-17 PROCEDURE — 25010000002 ACETAMINOPHEN 10 MG/ML SOLUTION: Performed by: ANESTHESIOLOGY

## 2024-05-17 PROCEDURE — 25010000002 POTASSIUM CHLORIDE PER 2 MEQ: Performed by: THORACIC SURGERY (CARDIOTHORACIC VASCULAR SURGERY)

## 2024-05-17 PROCEDURE — 25010000002 DIPHENHYDRAMINE PER 50 MG: Performed by: ANESTHESIOLOGY

## 2024-05-17 PROCEDURE — 93010 ELECTROCARDIOGRAM REPORT: CPT | Performed by: INTERNAL MEDICINE

## 2024-05-17 PROCEDURE — 25010000002 NICARDIPINE 2.5 MG/ML SOLUTION 10 ML VIAL: Performed by: NURSE PRACTITIONER

## 2024-05-17 PROCEDURE — 02UG0JZ SUPPLEMENT MITRAL VALVE WITH SYNTHETIC SUBSTITUTE, OPEN APPROACH: ICD-10-PCS | Performed by: THORACIC SURGERY (CARDIOTHORACIC VASCULAR SURGERY)

## 2024-05-17 PROCEDURE — 25810000003 SODIUM CHLORIDE 0.9 % SOLUTION 250 ML FLEX CONT: Performed by: NURSE PRACTITIONER

## 2024-05-17 PROCEDURE — 25010000002 ONDANSETRON PER 1 MG: Performed by: ANESTHESIOLOGY

## 2024-05-17 PROCEDURE — 85014 HEMATOCRIT: CPT

## 2024-05-17 PROCEDURE — 33533 CABG ARTERIAL SINGLE: CPT | Performed by: PHYSICIAN ASSISTANT

## 2024-05-17 PROCEDURE — 71045 X-RAY EXAM CHEST 1 VIEW: CPT

## 2024-05-17 PROCEDURE — 85730 THROMBOPLASTIN TIME PARTIAL: CPT | Performed by: THORACIC SURGERY (CARDIOTHORACIC VASCULAR SURGERY)

## 2024-05-17 PROCEDURE — 33427 REPAIR OF MITRAL VALVE: CPT | Performed by: PHYSICIAN ASSISTANT

## 2024-05-17 PROCEDURE — 25810000003 SODIUM CHLORIDE 0.9 % SOLUTION: Performed by: NURSE PRACTITIONER

## 2024-05-17 PROCEDURE — 25010000002 PHENYLEPHRINE 10 MG/ML SOLUTION: Performed by: ANESTHESIOLOGY

## 2024-05-17 PROCEDURE — 82947 ASSAY GLUCOSE BLOOD QUANT: CPT

## 2024-05-17 PROCEDURE — 84295 ASSAY OF SERUM SODIUM: CPT

## 2024-05-17 PROCEDURE — 82330 ASSAY OF CALCIUM: CPT

## 2024-05-17 PROCEDURE — 85018 HEMOGLOBIN: CPT

## 2024-05-17 PROCEDURE — 33427 REPAIR OF MITRAL VALVE: CPT | Performed by: THORACIC SURGERY (CARDIOTHORACIC VASCULAR SURGERY)

## 2024-05-17 PROCEDURE — 84132 ASSAY OF SERUM POTASSIUM: CPT | Performed by: THORACIC SURGERY (CARDIOTHORACIC VASCULAR SURGERY)

## 2024-05-17 PROCEDURE — 85347 COAGULATION TIME ACTIVATED: CPT

## 2024-05-17 PROCEDURE — 25010000002 CEFAZOLIN PER 500 MG: Performed by: ANESTHESIOLOGY

## 2024-05-17 PROCEDURE — 80069 RENAL FUNCTION PANEL: CPT | Performed by: NURSE PRACTITIONER

## 2024-05-17 PROCEDURE — 25010000002 HEPARIN (PORCINE) PER 1000 UNITS: Performed by: ANESTHESIOLOGY

## 2024-05-17 PROCEDURE — P9041 ALBUMIN (HUMAN),5%, 50ML: HCPCS | Performed by: NURSE PRACTITIONER

## 2024-05-17 PROCEDURE — 25010000002 HYDROCORTISONE SOD SUC (PF) 100 MG RECONSTITUTED SOLUTION: Performed by: ANESTHESIOLOGY

## 2024-05-17 PROCEDURE — 25010000002 CALCIUM GLUCONATE 2-0.675 GM/100ML-% SOLUTION: Performed by: NURSE PRACTITIONER

## 2024-05-17 PROCEDURE — 94002 VENT MGMT INPAT INIT DAY: CPT

## 2024-05-17 PROCEDURE — 85384 FIBRINOGEN ACTIVITY: CPT | Performed by: THORACIC SURGERY (CARDIOTHORACIC VASCULAR SURGERY)

## 2024-05-17 PROCEDURE — 85025 COMPLETE CBC W/AUTO DIFF WBC: CPT | Performed by: NURSE PRACTITIONER

## 2024-05-17 PROCEDURE — 25010000002 FENTANYL CITRATE (PF) 250 MCG/5ML SOLUTION: Performed by: ANESTHESIOLOGY

## 2024-05-17 PROCEDURE — 82565 ASSAY OF CREATININE: CPT

## 2024-05-17 PROCEDURE — 85027 COMPLETE CBC AUTOMATED: CPT | Performed by: THORACIC SURGERY (CARDIOTHORACIC VASCULAR SURGERY)

## 2024-05-17 PROCEDURE — 25010000002 AMIODARONE PER 30 MG: Performed by: ANESTHESIOLOGY

## 2024-05-17 PROCEDURE — 85576 BLOOD PLATELET AGGREGATION: CPT | Performed by: THORACIC SURGERY (CARDIOTHORACIC VASCULAR SURGERY)

## 2024-05-17 PROCEDURE — 25010000002 CEFAZOLIN PER 500 MG: Performed by: NURSE PRACTITIONER

## 2024-05-17 PROCEDURE — 82330 ASSAY OF CALCIUM: CPT | Performed by: NURSE PRACTITIONER

## 2024-05-17 PROCEDURE — 02100Z9 BYPASS CORONARY ARTERY, ONE ARTERY FROM LEFT INTERNAL MAMMARY, OPEN APPROACH: ICD-10-PCS | Performed by: THORACIC SURGERY (CARDIOTHORACIC VASCULAR SURGERY)

## 2024-05-17 PROCEDURE — 25010000002 PROTAMINE SULFATE PER 10 MG: Performed by: ANESTHESIOLOGY

## 2024-05-17 PROCEDURE — 5A1221Z PERFORMANCE OF CARDIAC OUTPUT, CONTINUOUS: ICD-10-PCS | Performed by: THORACIC SURGERY (CARDIOTHORACIC VASCULAR SURGERY)

## 2024-05-17 PROCEDURE — 25010000002 PAPAVERINE PER 60 MG: Performed by: THORACIC SURGERY (CARDIOTHORACIC VASCULAR SURGERY)

## 2024-05-17 PROCEDURE — 25010000002 NITROGLYCERIN 200 MCG/ML SOLUTION: Performed by: NURSE PRACTITIONER

## 2024-05-17 RX ORDER — DEXTROSE MONOHYDRATE 25 G/50ML
10-50 INJECTION, SOLUTION INTRAVENOUS
Status: DISCONTINUED | OUTPATIENT
Start: 2024-05-17 | End: 2024-05-19

## 2024-05-17 RX ORDER — NOREPINEPHRINE BITARTRATE 0.03 MG/ML
INJECTION, SOLUTION INTRAVENOUS CONTINUOUS PRN
Status: DISCONTINUED | OUTPATIENT
Start: 2024-05-17 | End: 2024-05-17 | Stop reason: SURG

## 2024-05-17 RX ORDER — ALBUMIN, HUMAN INJ 5% 5 %
12.5 SOLUTION INTRAVENOUS AS NEEDED
Status: COMPLETED | OUTPATIENT
Start: 2024-05-17 | End: 2024-05-18

## 2024-05-17 RX ORDER — SODIUM CHLORIDE 0.9 % (FLUSH) 0.9 %
30 SYRINGE (ML) INJECTION ONCE AS NEEDED
Status: DISCONTINUED | OUTPATIENT
Start: 2024-05-17 | End: 2024-05-17 | Stop reason: HOSPADM

## 2024-05-17 RX ORDER — NITROGLYCERIN 0.4 MG/1
0.4 TABLET SUBLINGUAL
Status: DISCONTINUED | OUTPATIENT
Start: 2024-05-17 | End: 2024-05-17 | Stop reason: HOSPADM

## 2024-05-17 RX ORDER — DEXMEDETOMIDINE HYDROCHLORIDE 4 UG/ML
.2-1.5 INJECTION, SOLUTION INTRAVENOUS
Status: DISCONTINUED | OUTPATIENT
Start: 2024-05-17 | End: 2024-05-18

## 2024-05-17 RX ORDER — POLYETHYLENE GLYCOL 3350 17 G/17G
17 POWDER, FOR SOLUTION ORAL 2 TIMES DAILY
Status: DISCONTINUED | OUTPATIENT
Start: 2024-05-17 | End: 2024-05-21

## 2024-05-17 RX ORDER — SODIUM CHLORIDE 9 MG/ML
30 INJECTION, SOLUTION INTRAVENOUS CONTINUOUS
Status: DISCONTINUED | OUTPATIENT
Start: 2024-05-17 | End: 2024-05-20

## 2024-05-17 RX ORDER — PANTOPRAZOLE SODIUM 40 MG/1
40 TABLET, DELAYED RELEASE ORAL
Status: DISCONTINUED | OUTPATIENT
Start: 2024-05-18 | End: 2024-05-22 | Stop reason: HOSPADM

## 2024-05-17 RX ORDER — VECURONIUM BROMIDE 1 MG/ML
INJECTION, POWDER, LYOPHILIZED, FOR SOLUTION INTRAVENOUS AS NEEDED
Status: DISCONTINUED | OUTPATIENT
Start: 2024-05-17 | End: 2024-05-17 | Stop reason: SURG

## 2024-05-17 RX ORDER — SODIUM CHLORIDE 9 MG/ML
30 INJECTION, SOLUTION INTRAVENOUS CONTINUOUS PRN
Status: DISCONTINUED | OUTPATIENT
Start: 2024-05-17 | End: 2024-05-17

## 2024-05-17 RX ORDER — ACETAMINOPHEN 325 MG/1
650 TABLET ORAL EVERY 4 HOURS PRN
Status: DISCONTINUED | OUTPATIENT
Start: 2024-05-17 | End: 2024-05-17 | Stop reason: HOSPADM

## 2024-05-17 RX ORDER — ENOXAPARIN SODIUM 100 MG/ML
40 INJECTION SUBCUTANEOUS EVERY 24 HOURS
Status: DISCONTINUED | OUTPATIENT
Start: 2024-05-18 | End: 2024-05-18

## 2024-05-17 RX ORDER — ACETAMINOPHEN 10 MG/ML
INJECTION, SOLUTION INTRAVENOUS AS NEEDED
Status: DISCONTINUED | OUTPATIENT
Start: 2024-05-17 | End: 2024-05-17 | Stop reason: SURG

## 2024-05-17 RX ORDER — AMIODARONE HYDROCHLORIDE 150 MG/3ML
INJECTION, SOLUTION INTRAVENOUS AS NEEDED
Status: DISCONTINUED | OUTPATIENT
Start: 2024-05-17 | End: 2024-05-17 | Stop reason: SURG

## 2024-05-17 RX ORDER — ASPIRIN 81 MG/1
81 TABLET ORAL DAILY
Status: DISCONTINUED | OUTPATIENT
Start: 2024-05-18 | End: 2024-05-22 | Stop reason: HOSPADM

## 2024-05-17 RX ORDER — FENTANYL CITRATE 50 UG/ML
INJECTION, SOLUTION INTRAMUSCULAR; INTRAVENOUS AS NEEDED
Status: DISCONTINUED | OUTPATIENT
Start: 2024-05-17 | End: 2024-05-17 | Stop reason: SURG

## 2024-05-17 RX ORDER — ONDANSETRON 2 MG/ML
INJECTION INTRAMUSCULAR; INTRAVENOUS AS NEEDED
Status: DISCONTINUED | OUTPATIENT
Start: 2024-05-17 | End: 2024-05-17 | Stop reason: SURG

## 2024-05-17 RX ORDER — MAGNESIUM SULFATE HEPTAHYDRATE 500 MG/ML
INJECTION, SOLUTION INTRAMUSCULAR; INTRAVENOUS AS NEEDED
Status: DISCONTINUED | OUTPATIENT
Start: 2024-05-17 | End: 2024-05-17 | Stop reason: SURG

## 2024-05-17 RX ORDER — FENTANYL/ROPIVACAINE/NS/PF 2-625MCG/1
15 PLASTIC BAG, INJECTION (ML) EPIDURAL
Status: COMPLETED | OUTPATIENT
Start: 2024-05-17 | End: 2024-05-17

## 2024-05-17 RX ORDER — NALOXONE HCL 0.4 MG/ML
0.4 VIAL (ML) INJECTION
Status: DISCONTINUED | OUTPATIENT
Start: 2024-05-17 | End: 2024-05-20

## 2024-05-17 RX ORDER — IBUPROFEN 600 MG/1
1 TABLET ORAL
Status: DISCONTINUED | OUTPATIENT
Start: 2024-05-17 | End: 2024-05-17 | Stop reason: HOSPADM

## 2024-05-17 RX ORDER — SODIUM CHLORIDE 9 MG/ML
INJECTION, SOLUTION INTRAVENOUS CONTINUOUS PRN
Status: DISCONTINUED | OUTPATIENT
Start: 2024-05-17 | End: 2024-05-17 | Stop reason: SURG

## 2024-05-17 RX ORDER — NICOTINE POLACRILEX 4 MG
15 LOZENGE BUCCAL
Status: DISCONTINUED | OUTPATIENT
Start: 2024-05-17 | End: 2024-05-17 | Stop reason: HOSPADM

## 2024-05-17 RX ORDER — SODIUM CHLORIDE 0.9 % (FLUSH) 0.9 %
3 SYRINGE (ML) INJECTION EVERY 12 HOURS SCHEDULED
Status: DISCONTINUED | OUTPATIENT
Start: 2024-05-17 | End: 2024-05-17 | Stop reason: HOSPADM

## 2024-05-17 RX ORDER — CHLORHEXIDINE GLUCONATE ORAL RINSE 1.2 MG/ML
15 SOLUTION DENTAL EVERY 12 HOURS
Status: DISCONTINUED | OUTPATIENT
Start: 2024-05-17 | End: 2024-05-20

## 2024-05-17 RX ORDER — KETAMINE HCL IN NACL, ISO-OSM 100MG/10ML
SYRINGE (ML) INJECTION AS NEEDED
Status: DISCONTINUED | OUTPATIENT
Start: 2024-05-17 | End: 2024-05-17 | Stop reason: SURG

## 2024-05-17 RX ORDER — CHLORHEXIDINE GLUCONATE 500 MG/1
CLOTH TOPICAL EVERY 12 HOURS PRN
Status: DISCONTINUED | OUTPATIENT
Start: 2024-05-17 | End: 2024-05-17 | Stop reason: HOSPADM

## 2024-05-17 RX ORDER — PANTOPRAZOLE SODIUM 40 MG/10ML
40 INJECTION, POWDER, LYOPHILIZED, FOR SOLUTION INTRAVENOUS ONCE
Qty: 10 ML | Refills: 0 | Status: COMPLETED | OUTPATIENT
Start: 2024-05-17 | End: 2024-05-17

## 2024-05-17 RX ORDER — CHLORHEXIDINE GLUCONATE ORAL RINSE 1.2 MG/ML
15 SOLUTION DENTAL ONCE
Status: COMPLETED | OUTPATIENT
Start: 2024-05-17 | End: 2024-05-17

## 2024-05-17 RX ORDER — MAGNESIUM HYDROXIDE 1200 MG/15ML
LIQUID ORAL AS NEEDED
Status: DISCONTINUED | OUTPATIENT
Start: 2024-05-17 | End: 2024-05-17 | Stop reason: HOSPADM

## 2024-05-17 RX ORDER — ALPRAZOLAM 0.25 MG/1
0.25 TABLET ORAL ONCE
Status: DISCONTINUED | OUTPATIENT
Start: 2024-05-17 | End: 2024-05-17 | Stop reason: HOSPADM

## 2024-05-17 RX ORDER — DEXTROSE MONOHYDRATE 25 G/50ML
INJECTION, SOLUTION INTRAVENOUS AS NEEDED
Status: DISCONTINUED | OUTPATIENT
Start: 2024-05-17 | End: 2024-05-17 | Stop reason: SURG

## 2024-05-17 RX ORDER — VASOPRESSIN 20 U/ML
INJECTION PARENTERAL AS NEEDED
Status: DISCONTINUED | OUTPATIENT
Start: 2024-05-17 | End: 2024-05-17 | Stop reason: SURG

## 2024-05-17 RX ORDER — CALCIUM GLUCONATE 20 MG/ML
2000 INJECTION, SOLUTION INTRAVENOUS ONCE
Status: COMPLETED | OUTPATIENT
Start: 2024-05-17 | End: 2024-05-17

## 2024-05-17 RX ORDER — NITROGLYCERIN 20 MG/100ML
5-50 INJECTION INTRAVENOUS CONTINUOUS PRN
Status: DISCONTINUED | OUTPATIENT
Start: 2024-05-17 | End: 2024-05-20

## 2024-05-17 RX ORDER — NICOTINE POLACRILEX 4 MG
15 LOZENGE BUCCAL
Status: DISCONTINUED | OUTPATIENT
Start: 2024-05-17 | End: 2024-05-19

## 2024-05-17 RX ORDER — MIDAZOLAM HYDROCHLORIDE 1 MG/ML
INJECTION INTRAMUSCULAR; INTRAVENOUS AS NEEDED
Status: DISCONTINUED | OUTPATIENT
Start: 2024-05-17 | End: 2024-05-17 | Stop reason: SURG

## 2024-05-17 RX ORDER — AMINOCAPROIC ACID 250 MG/ML
INJECTION, SOLUTION INTRAVENOUS AS NEEDED
Status: DISCONTINUED | OUTPATIENT
Start: 2024-05-17 | End: 2024-05-17 | Stop reason: SURG

## 2024-05-17 RX ORDER — ACETAMINOPHEN 160 MG/5ML
650 SOLUTION ORAL EVERY 4 HOURS PRN
Status: DISCONTINUED | OUTPATIENT
Start: 2024-05-18 | End: 2024-05-22 | Stop reason: HOSPADM

## 2024-05-17 RX ORDER — MEPERIDINE HYDROCHLORIDE 25 MG/ML
25 INJECTION INTRAMUSCULAR; INTRAVENOUS; SUBCUTANEOUS ONCE AS NEEDED
Status: DISCONTINUED | OUTPATIENT
Start: 2024-05-17 | End: 2024-05-22 | Stop reason: HOSPADM

## 2024-05-17 RX ORDER — DIPHENHYDRAMINE HYDROCHLORIDE 50 MG/ML
INJECTION INTRAMUSCULAR; INTRAVENOUS AS NEEDED
Status: DISCONTINUED | OUTPATIENT
Start: 2024-05-17 | End: 2024-05-17 | Stop reason: SURG

## 2024-05-17 RX ORDER — NOREPINEPHRINE BITARTRATE 0.03 MG/ML
.02-.06 INJECTION, SOLUTION INTRAVENOUS CONTINUOUS PRN
Status: DISCONTINUED | OUTPATIENT
Start: 2024-05-17 | End: 2024-05-20

## 2024-05-17 RX ORDER — POTASSIUM CHLORIDE 29.8 MG/ML
20 INJECTION INTRAVENOUS ONCE
Status: COMPLETED | OUTPATIENT
Start: 2024-05-17 | End: 2024-05-17

## 2024-05-17 RX ORDER — ASPIRIN 325 MG
325 TABLET ORAL ONCE
Qty: 1 TABLET | Refills: 0 | Status: COMPLETED | OUTPATIENT
Start: 2024-05-17 | End: 2024-05-17

## 2024-05-17 RX ORDER — DEXTROSE MONOHYDRATE 25 G/50ML
10-50 INJECTION, SOLUTION INTRAVENOUS
Status: DISCONTINUED | OUTPATIENT
Start: 2024-05-17 | End: 2024-05-17 | Stop reason: HOSPADM

## 2024-05-17 RX ORDER — ONDANSETRON 2 MG/ML
4 INJECTION INTRAMUSCULAR; INTRAVENOUS EVERY 6 HOURS PRN
Status: DISCONTINUED | OUTPATIENT
Start: 2024-05-17 | End: 2024-05-22 | Stop reason: HOSPADM

## 2024-05-17 RX ORDER — MORPHINE SULFATE 2 MG/ML
2 INJECTION, SOLUTION INTRAMUSCULAR; INTRAVENOUS
Status: DISCONTINUED | OUTPATIENT
Start: 2024-05-17 | End: 2024-05-20

## 2024-05-17 RX ORDER — ACETAMINOPHEN 325 MG/1
650 TABLET ORAL EVERY 4 HOURS PRN
Status: DISCONTINUED | OUTPATIENT
Start: 2024-05-18 | End: 2024-05-22 | Stop reason: HOSPADM

## 2024-05-17 RX ORDER — IBUPROFEN 600 MG/1
1 TABLET ORAL
Status: DISCONTINUED | OUTPATIENT
Start: 2024-05-17 | End: 2024-05-19

## 2024-05-17 RX ORDER — ATORVASTATIN CALCIUM 40 MG/1
40 TABLET, FILM COATED ORAL NIGHTLY
Status: DISCONTINUED | OUTPATIENT
Start: 2024-05-18 | End: 2024-05-22 | Stop reason: HOSPADM

## 2024-05-17 RX ORDER — EPHEDRINE SULFATE 5 MG/ML
INJECTION INTRAVENOUS AS NEEDED
Status: DISCONTINUED | OUTPATIENT
Start: 2024-05-17 | End: 2024-05-17 | Stop reason: SURG

## 2024-05-17 RX ORDER — ACETAMINOPHEN 650 MG
TABLET, EXTENDED RELEASE ORAL AS NEEDED
Status: DISCONTINUED | OUTPATIENT
Start: 2024-05-17 | End: 2024-05-17 | Stop reason: HOSPADM

## 2024-05-17 RX ORDER — MAGNESIUM SULFATE 1 G/100ML
1 INJECTION INTRAVENOUS EVERY 8 HOURS
Qty: 300 ML | Refills: 0 | Status: COMPLETED | OUTPATIENT
Start: 2024-05-17 | End: 2024-05-18

## 2024-05-17 RX ORDER — ACETAMINOPHEN 10 MG/ML
1000 INJECTION, SOLUTION INTRAVENOUS EVERY 8 HOURS
Qty: 200 ML | Refills: 0 | Status: COMPLETED | OUTPATIENT
Start: 2024-05-17 | End: 2024-05-18

## 2024-05-17 RX ORDER — SODIUM CHLORIDE 0.9 % (FLUSH) 0.9 %
3-10 SYRINGE (ML) INJECTION AS NEEDED
Status: DISCONTINUED | OUTPATIENT
Start: 2024-05-17 | End: 2024-05-17 | Stop reason: HOSPADM

## 2024-05-17 RX ORDER — HEPARIN SODIUM 1000 [USP'U]/ML
INJECTION, SOLUTION INTRAVENOUS; SUBCUTANEOUS AS NEEDED
Status: DISCONTINUED | OUTPATIENT
Start: 2024-05-17 | End: 2024-05-17 | Stop reason: SURG

## 2024-05-17 RX ORDER — PHENYLEPHRINE HYDROCHLORIDE 10 MG/ML
INJECTION INTRAVENOUS AS NEEDED
Status: DISCONTINUED | OUTPATIENT
Start: 2024-05-17 | End: 2024-05-17 | Stop reason: SURG

## 2024-05-17 RX ORDER — NITROGLYCERIN 0.4 MG/1
0.4 TABLET SUBLINGUAL
Status: DISCONTINUED | OUTPATIENT
Start: 2024-05-17 | End: 2024-05-22 | Stop reason: HOSPADM

## 2024-05-17 RX ORDER — ACETAMINOPHEN 650 MG/1
650 SUPPOSITORY RECTAL EVERY 4 HOURS PRN
Status: DISCONTINUED | OUTPATIENT
Start: 2024-05-18 | End: 2024-05-22 | Stop reason: HOSPADM

## 2024-05-17 RX ORDER — AMOXICILLIN 250 MG
2 CAPSULE ORAL 2 TIMES DAILY
Status: DISCONTINUED | OUTPATIENT
Start: 2024-05-17 | End: 2024-05-21

## 2024-05-17 RX ORDER — HYDROCODONE BITARTRATE AND ACETAMINOPHEN 5; 325 MG/1; MG/1
1 TABLET ORAL EVERY 4 HOURS PRN
Status: DISCONTINUED | OUTPATIENT
Start: 2024-05-17 | End: 2024-05-22 | Stop reason: HOSPADM

## 2024-05-17 RX ORDER — SODIUM CHLORIDE 9 MG/ML
40 INJECTION, SOLUTION INTRAVENOUS AS NEEDED
Status: DISCONTINUED | OUTPATIENT
Start: 2024-05-17 | End: 2024-05-17 | Stop reason: HOSPADM

## 2024-05-17 RX ADMIN — MUPIROCIN 1 APPLICATION: 20 OINTMENT TOPICAL at 20:06

## 2024-05-17 RX ADMIN — SODIUM CHLORIDE: 9 INJECTION, SOLUTION INTRAVENOUS at 06:54

## 2024-05-17 RX ADMIN — EPHEDRINE SULFATE 10 MG: 5 INJECTION INTRAVENOUS at 07:17

## 2024-05-17 RX ADMIN — HEPARIN SODIUM 22000 UNITS: 1000 INJECTION INTRAVENOUS; SUBCUTANEOUS at 08:34

## 2024-05-17 RX ADMIN — POLYETHYLENE GLYCOL 3350 17 G: 17 POWDER, FOR SOLUTION ORAL at 20:06

## 2024-05-17 RX ADMIN — SODIUM CHLORIDE: 9 INJECTION, SOLUTION INTRAVENOUS at 06:55

## 2024-05-17 RX ADMIN — SODIUM CHLORIDE: 9 INJECTION, SOLUTION INTRAVENOUS at 08:24

## 2024-05-17 RX ADMIN — PANTOPRAZOLE SODIUM 40 MG: 40 INJECTION, POWDER, FOR SOLUTION INTRAVENOUS at 17:27

## 2024-05-17 RX ADMIN — Medication 25 MG: at 07:00

## 2024-05-17 RX ADMIN — METOPROLOL TARTRATE 12.5 MG: 25 TABLET, FILM COATED ORAL at 05:58

## 2024-05-17 RX ADMIN — VECURONIUM BROMIDE 4 MG: 1 INJECTION, POWDER, LYOPHILIZED, FOR SOLUTION INTRAVENOUS at 08:05

## 2024-05-17 RX ADMIN — SODIUM CHLORIDE 2 G: 900 INJECTION INTRAVENOUS at 07:30

## 2024-05-17 RX ADMIN — VASOPRESSIN 2 UNITS: 20 INJECTION INTRAVENOUS at 10:51

## 2024-05-17 RX ADMIN — MAGNESIUM SULFATE HEPTAHYDRATE 2 G: 500 INJECTION, SOLUTION INTRAMUSCULAR; INTRAVENOUS at 10:11

## 2024-05-17 RX ADMIN — MAGNESIUM SULFATE IN DEXTROSE 1 G: 10 INJECTION, SOLUTION INTRAVENOUS at 17:27

## 2024-05-17 RX ADMIN — PHENYLEPHRINE HYDROCHLORIDE 150 MCG: 10 INJECTION INTRAVENOUS at 07:31

## 2024-05-17 RX ADMIN — PHENYLEPHRINE HYDROCHLORIDE 150 MCG: 10 INJECTION INTRAVENOUS at 08:40

## 2024-05-17 RX ADMIN — NICARDIPINE HYDROCHLORIDE 5 MG/HR: 25 INJECTION, SOLUTION INTRAVENOUS at 13:09

## 2024-05-17 RX ADMIN — PROTAMINE SULFATE 250 MG: 10 INJECTION, SOLUTION INTRAVENOUS at 10:24

## 2024-05-17 RX ADMIN — POTASSIUM CHLORIDE 20 MEQ: 400 INJECTION, SOLUTION INTRAVENOUS at 14:48

## 2024-05-17 RX ADMIN — PHENYLEPHRINE HYDROCHLORIDE 100 MCG: 10 INJECTION INTRAVENOUS at 10:49

## 2024-05-17 RX ADMIN — FENTANYL CITRATE 250 MCG: 50 INJECTION, SOLUTION INTRAMUSCULAR; INTRAVENOUS at 07:05

## 2024-05-17 RX ADMIN — VASOPRESSIN 2 UNITS: 20 INJECTION INTRAVENOUS at 08:59

## 2024-05-17 RX ADMIN — ONDANSETRON 4 MG: 2 INJECTION INTRAMUSCULAR; INTRAVENOUS at 10:35

## 2024-05-17 RX ADMIN — ALBUMIN (HUMAN) 12.5 G: 12.5 INJECTION, SOLUTION INTRAVENOUS at 14:00

## 2024-05-17 RX ADMIN — ALBUMIN (HUMAN) 12.5 G: 12.5 INJECTION, SOLUTION INTRAVENOUS at 12:23

## 2024-05-17 RX ADMIN — CHLORHEXIDINE GLUCONATE, 0.12% ORAL RINSE 15 ML: 1.2 SOLUTION DENTAL at 05:58

## 2024-05-17 RX ADMIN — Medication 0.04 MCG/KG/MIN: at 07:11

## 2024-05-17 RX ADMIN — POTASSIUM PHOSPHATE, MONOBASIC AND POTASSIUM PHOSPHATE, DIBASIC 15 MMOL: 224; 236 INJECTION, SOLUTION, CONCENTRATE INTRAVENOUS at 14:55

## 2024-05-17 RX ADMIN — ASPIRIN 325 MG ORAL TABLET 325 MG: 325 PILL ORAL at 17:27

## 2024-05-17 RX ADMIN — MIDAZOLAM 5 MG: 1 INJECTION INTRAMUSCULAR; INTRAVENOUS at 07:05

## 2024-05-17 RX ADMIN — FENTANYL CITRATE 250 MCG: 50 INJECTION, SOLUTION INTRAMUSCULAR; INTRAVENOUS at 10:20

## 2024-05-17 RX ADMIN — SODIUM CHLORIDE 30 ML/HR: 9 INJECTION, SOLUTION INTRAVENOUS at 12:57

## 2024-05-17 RX ADMIN — INSULIN HUMAN 2.8 UNITS/HR: 1 INJECTION, SOLUTION INTRAVENOUS at 14:51

## 2024-05-17 RX ADMIN — CALCIUM GLUCONATE 2000 MG: 20 INJECTION, SOLUTION INTRAVENOUS at 16:44

## 2024-05-17 RX ADMIN — CHLORHEXIDINE GLUCONATE, 0.12% ORAL RINSE 15 ML: 1.2 SOLUTION DENTAL at 20:06

## 2024-05-17 RX ADMIN — PHENYLEPHRINE HYDROCHLORIDE 100 MCG: 10 INJECTION INTRAVENOUS at 08:29

## 2024-05-17 RX ADMIN — MUPIROCIN 1 APPLICATION: 20 OINTMENT TOPICAL at 05:58

## 2024-05-17 RX ADMIN — VECURONIUM BROMIDE 6 MG: 1 INJECTION, POWDER, LYOPHILIZED, FOR SOLUTION INTRAVENOUS at 08:41

## 2024-05-17 RX ADMIN — EPHEDRINE SULFATE 10 MG: 5 INJECTION INTRAVENOUS at 07:29

## 2024-05-17 RX ADMIN — CEFAZOLIN 2 G: 2 INJECTION, POWDER, LYOPHILIZED, FOR SOLUTION INTRAVENOUS at 10:23

## 2024-05-17 RX ADMIN — Medication 25 MG: at 07:05

## 2024-05-17 RX ADMIN — VECURONIUM BROMIDE 2 MG: 1 INJECTION, POWDER, LYOPHILIZED, FOR SOLUTION INTRAVENOUS at 09:34

## 2024-05-17 RX ADMIN — VASOPRESSIN 2 UNITS: 20 INJECTION INTRAVENOUS at 09:49

## 2024-05-17 RX ADMIN — ACETAMINOPHEN 1000 MG: 10 INJECTION INTRAVENOUS at 17:27

## 2024-05-17 RX ADMIN — EPHEDRINE SULFATE 10 MG: 5 INJECTION INTRAVENOUS at 07:24

## 2024-05-17 RX ADMIN — POTASSIUM PHOSPHATE, MONOBASIC AND POTASSIUM PHOSPHATE, DIBASIC 15 MMOL: 224; 236 INJECTION, SOLUTION, CONCENTRATE INTRAVENOUS at 13:29

## 2024-05-17 RX ADMIN — PHENYLEPHRINE HYDROCHLORIDE 100 MCG: 10 INJECTION INTRAVENOUS at 11:10

## 2024-05-17 RX ADMIN — ALBUMIN (HUMAN) 12.5 G: 12.5 INJECTION, SOLUTION INTRAVENOUS at 15:42

## 2024-05-17 RX ADMIN — AMIODARONE HYDROCHLORIDE 150 MG: 50 INJECTION, SOLUTION INTRAVENOUS at 10:17

## 2024-05-17 RX ADMIN — DEXMEDETOMIDINE HYDROCHLORIDE 0.5 MCG/KG/HR: 100 INJECTION, SOLUTION INTRAVENOUS at 07:00

## 2024-05-17 RX ADMIN — INSULIN HUMAN 5 UNITS/HR: 1 INJECTION, SOLUTION INTRAVENOUS at 08:14

## 2024-05-17 RX ADMIN — PHENYLEPHRINE HYDROCHLORIDE 150 MCG: 10 INJECTION INTRAVENOUS at 08:53

## 2024-05-17 RX ADMIN — SODIUM CHLORIDE 2 G: 900 INJECTION INTRAVENOUS at 16:45

## 2024-05-17 RX ADMIN — SENNOSIDES AND DOCUSATE SODIUM 2 TABLET: 50; 8.6 TABLET ORAL at 20:06

## 2024-05-17 RX ADMIN — SODIUM CHLORIDE: 9 INJECTION, SOLUTION INTRAVENOUS at 11:03

## 2024-05-17 RX ADMIN — VECURONIUM BROMIDE 3 MG: 1 INJECTION, POWDER, LYOPHILIZED, FOR SOLUTION INTRAVENOUS at 09:16

## 2024-05-17 RX ADMIN — AMINOCAPROIC ACID 10 G: 250 INJECTION, SOLUTION INTRAVENOUS at 08:15

## 2024-05-17 RX ADMIN — AMINOCAPROIC ACID 10 G: 250 INJECTION, SOLUTION INTRAVENOUS at 10:28

## 2024-05-17 RX ADMIN — FENTANYL CITRATE 500 MCG: 50 INJECTION, SOLUTION INTRAMUSCULAR; INTRAVENOUS at 07:59

## 2024-05-17 RX ADMIN — HYDROCORTISONE SODIUM SUCCINATE 100 MG: 100 INJECTION, POWDER, FOR SOLUTION INTRAMUSCULAR; INTRAVENOUS at 11:47

## 2024-05-17 RX ADMIN — DIPHENHYDRAMINE HYDROCHLORIDE 50 MG: 50 INJECTION, SOLUTION INTRAMUSCULAR; INTRAVENOUS at 11:45

## 2024-05-17 RX ADMIN — MIDAZOLAM 5 MG: 1 INJECTION INTRAMUSCULAR; INTRAVENOUS at 07:00

## 2024-05-17 RX ADMIN — DEXTROSE MONOHYDRATE 10 ML: 25 INJECTION, SOLUTION INTRAVENOUS at 11:27

## 2024-05-17 RX ADMIN — PHENYLEPHRINE HYDROCHLORIDE 100 MCG: 10 INJECTION INTRAVENOUS at 10:45

## 2024-05-17 RX ADMIN — VECURONIUM BROMIDE 10 MG: 1 INJECTION, POWDER, LYOPHILIZED, FOR SOLUTION INTRAVENOUS at 07:05

## 2024-05-17 RX ADMIN — NITROGLYCERIN 10 MCG/MIN: 20 INJECTION INTRAVENOUS at 12:57

## 2024-05-17 RX ADMIN — ACETAMINOPHEN 1000 MG: 10 INJECTION INTRAVENOUS at 10:35

## 2024-05-17 RX ADMIN — PHENYLEPHRINE HYDROCHLORIDE 150 MCG: 10 INJECTION INTRAVENOUS at 08:52

## 2024-05-17 RX ADMIN — VASOPRESSIN 2 UNITS: 20 INJECTION INTRAVENOUS at 08:55

## 2024-05-17 NOTE — CASE MANAGEMENT/SOCIAL WORK
Discharge Planning Assessment   Juan Carlos     Patient Name: Maurice Alvarado  MRN: [id number]  Today's Date: 5/17/2024    Admit Date: 5/17/2024  Plan: Return home w/ wife. CABG 5/17/2024.   Discharge Needs Assessment       Row Name 05/17/24 1314       Living Environment    People in Home spouse    Name(s) of People in Home Stephenie Fasutin    Current Living Arrangements home    Potentially Unsafe Housing Conditions none    In the past 12 months has the electric, gas, oil, or water company threatened to shut off services in your home? No    Primary Care Provided by self    Provides Primary Care For no one    Family Caregiver if Needed spouse    Family Caregiver Names Stephenie Faustin    Quality of Family Relationships helpful;involved;supportive    Able to Return to Prior Arrangements yes       Resource/Environmental Concerns    Resource/Environmental Concerns none    Transportation Concerns none       Transportation Needs    In the past 12 months, has lack of transportation kept you from medical appointments or from getting medications? no    In the past 12 months, has lack of transportation kept you from meetings, work, or from getting things needed for daily living? No       Food Insecurity    Within the past 12 months, you worried that your food would run out before you got the money to buy more. Never true    Within the past 12 months, the food you bought just didn't last and you didn't have money to get more. Never true       Transition Planning    Patient/Family Anticipates Transition to home with family    Patient/Family Anticipated Services at Transition none    Transportation Anticipated car, drives self;family or friend will provide       Discharge Needs Assessment    Readmission Within the Last 30 Days no previous admission in last 30 days    Equipment Currently Used at Home none    Concerns to be Addressed denies needs/concerns at this time    Anticipated Changes Related to Illness none    Equipment Needed After  Discharge none    Provided Post Acute Provider List? N/A    Provided Post Acute Provider Quality & Resource List? N/A                   Discharge Plan       Row Name 05/17/24 7223       Plan    Plan Return home w/ wife. CABG 5/17/2024.    Patient/Family in Agreement with Plan yes    Provided Post Acute Provider List? N/A    Provided Post Acute Provider Quality & Resource List? N/A    Plan Comments CM met with patient wife and son in waiting room. Patient wife states he lives at home with her, is independent with ADLs, drives self, and denies current DME. Wife confirms PCP [facility], and [facility] for preferred pharmacy. Wife agreeable to enrolment in [id number]. Patient wife denies current HHC or therapy. Wife denies financial difficulty affording medications, food, or utilities. Wife states at time of discharge she will drive  home. DC barriers: CABG post-op day 1.                  Continued Care and Services - Admitted Since 5/17/2024    No active coordination exists for this encounter.          Demographic Summary       Row Name 05/17/24 3666       General Information    Admission Type inpatient    Arrived From emergency department    Referral Source admission list    Reason for Consult discharge planning;care coordination/care conference    Preferred Language English       Contact Information    Permission Granted to Share Info With                    Functional Status       Row Name 05/17/24 2786       Functional Status    Usual Activity Tolerance good    Current Activity Tolerance good       Functional Status, IADL    Medications independent    Meal Preparation independent    Housekeeping independent    Laundry independent    Shopping independent       Mental Status    General Appearance WDL WDL       Mental Status Summary    Recent Changes in Mental Status/Cognitive Functioning no changes       Employment/    Employment Status retired              Patient Forms       Row  Name 05/17/24 1257       Patient Forms    Important Message from Medicare ([facility]) Delivered  Delivered per registration 5/17/2024.               Caroline Johnston RN      Office Phone [phone]

## 2024-05-17 NOTE — ANESTHESIA PROCEDURE NOTES
Airway  Urgency: elective    Date/Time: 5/17/2024 7:09 AM  Airway not difficult    General Information and Staff    Patient location during procedure: OR  Anesthesiologist: Lamberto Jaramillo MD    Indications and Patient Condition  Indications for airway management: airway protection    Preoxygenated: yes  MILS not maintained throughout  Mask difficulty assessment: 1 - vent by mask    Final Airway Details  Final airway type: endotracheal airway      Successful airway: ETT  Cuffed: yes   Successful intubation technique: direct laryngoscopy  Facilitating devices/methods: anterior pressure/BURP  Endotracheal tube insertion site: oral  Blade: Malik  Blade size: 4  ETT size (mm): 7.5  Cormack-Lehane Classification: grade I - full view of glottis  Placement verified by: capnometry and palpation of cuff   Measured from: lips  ETT/EBT  to lips (cm): 22  Number of attempts at approach: 1  Assessment: lips, teeth, and gum same as pre-op and atraumatic intubation    Additional Comments  ASA monitors applied; preoxygenated with 100% FiO2 via anesthesia face mask; induction of general anesthesia; bag-mask ventilation; patient's position optimized; laryngoscopy; thyroid cartilage pressure applied to enhance view of glottis; cuffed ETT lubricated and correctly placed into the trachea; cuff inflated to seal with minimally occlusive airway cuff pressure; ETT connected to anesthesia circuit; atraumatic/dentition in preoperative condition; ETT well secured in place; correct placement in the trachea confirmed by bilateral chest rise, tube condensation, and return of EtCO2 > 30 mmHg x3

## 2024-05-17 NOTE — ANESTHESIA PROCEDURE NOTES
Central Line      Patient reassessed immediately prior to procedure    Staff  Anesthesiologist: Wilber Del Valle MD  Preanesthetic Checklist  Completed: patient identified, IV checked, site marked, risks and benefits discussed, surgical consent, monitors and equipment checked, pre-op evaluation and timeout performed  Central Line Prep  Sterile Tech:gloves, cap, gown, mask and sterile barriers  Prep: chloraprep  Patient monitoring: blood pressure monitoring, continuous pulse oximetry and EKG  Central Line Procedure  Laterality:right  Location:internal jugular  Catheter Type:[place]ry  Catheter Size:9 Fr  Guidance:landmark technique and palpation technique  Assessment  Post procedure:biopatch applied, line sutured and occlusive dressing applied  Assessement:blood return through all ports, free fluid flow, chest x-ray ordered and Reynaldo Test  Complications:no  Patient Tolerance:patient tolerated the procedure well with no apparent complications  Additional Notes  Floated 1 attempt no wedge no resistance

## 2024-05-17 NOTE — ANESTHESIA POSTPROCEDURE EVALUATION
Patient: Maurice Alvarado    Procedure Summary       Date: 05/17/24 Room / Location: [facility] CVOR 01 / [facility]OR    Anesthesia Start: 0654 Anesthesia Stop: 1204    Procedures:       CORONARY ARTERY BYPASS WITH INTERNAL MAMMARY ARTERY GRAFT with intraop LEO (Chest)      MITRAL VALVE REPAIR/REPLACEMENT (Chest) Diagnosis:       Mitral valve disease      CAD (coronary artery disease)      (Mitral valve disease [I05.9])      (CAD (coronary artery disease) [I25.10])    Surgeons: Sean Grier MD Provider: Wilber Del Valle MD    Anesthesia Type: general, Camille, CVL, PAC ASA Status: 4            Anesthesia Type: general, Camille, CVL, PAC    Vitals  Vitals Value Taken Time   BP     Temp 96.8 °F (36 °C) 05/17/24 1210   Pulse 80 05/17/24 1210   Resp     SpO2 99 % 05/17/24 1210   Vitals shown include unfiled device data.        Post Anesthesia Care and Evaluation    Patient location during evaluation: ICU  Patient participation: complete - patient cannot participate  Level of consciousness: obtunded/minimal responses  Pain scale: See nurse's notes for pain score.  Pain management: adequate    Airway patency: patent  Anesthetic complications: No anesthetic complications  PONV Status: none  Cardiovascular status: acceptable  Respiratory status: acceptable, ventilator and ETT  Hydration status: acceptable    Comments: Patient seen and examined postoperatively; vital signs stable; SpO2 greater than or equal to 90%; cardiopulmonary status stable; nausea/vomiting adequately controlled; pain adequately controlled; no apparent anesthesia complications; patient discharged from anesthesia care when discharge criteria were met

## 2024-05-17 NOTE — ANESTHESIA PROCEDURE NOTES
Central Line      Patient reassessed immediately prior to procedure    Start time: 5/17/2024 7:14 AM  Stop Time:5/17/2024 7:29 AM  Staff  Anesthesiologist: Wilber Del Valle MD  Preanesthetic Checklist  Completed: patient identified, IV checked, site marked, risks and benefits discussed, surgical consent, monitors and equipment checked, pre-op evaluation and timeout performed  Central Line Prep  Sterile Tech:gloves, cap, gown, mask and sterile barriers  Prep: chloraprep  Patient monitoring: blood pressure monitoring, continuous pulse oximetry and EKG  Central Line Procedure  Laterality:right  Location:internal jugular  Catheter Type:Cordis  Catheter Size:9 Fr  Guidance:ultrasound guided, landmark technique and palpation technique  PROCEDURE NOTE/ULTRASOUND INTERPRETATION.  Using ultrasound guidance the potential vascular sites for insertion of the catheter were visualized to determine the patency of the vessel to be used for vascular access.  After selecting the appropriate site for insertion, the needle was visualized under ultrasound being inserted into the internal jugular vein, followed by ultrasound confirmation of wire and catheter placement. There were no abnormalities seen on ultrasound; an image was taken; and the patient tolerated the procedure with no complications. Images: still images not obtained  Assessment  Post procedure:biopatch applied, line sutured and occlusive dressing applied  Assessement:blood return through all ports, free fluid flow, chest x-ray ordered and Reynaldo Test  Complications:no  Patient Tolerance:patient tolerated the procedure well with no apparent complications  Additional Notes  Sterile prep, drape, gown and gloves.  Negative reynaldo negative carotid, no difficulties.  Tolerated well.

## 2024-05-17 NOTE — PROGRESS NOTES
CARDIOLOGY PROGRESS NOTE:    Maurice Alvarado  83 y.o.  male  1940  [id number]      Referring Provider: Cardiac surgeon    Reason for follow-up: S/p cardiac surgery     Patient Care Team:  Alexandria Tubbs MD as PCP - General  Lenny Gilman MD as Consulting Physician (Cardiology)    Subjective .  Intubated and sedated    Objective intubated     Review of Systems   Unable to perform ROS: Intubated       Allergies: Patient has no known allergies.    Scheduled Meds:acetaminophen, 1,000 mg, Intravenous, Q8H  [START ON 5/18/2024] aspirin, 81 mg, Oral, Daily  aspirin, 325 mg, Oral, Once  [START ON 5/18/2024] atorvastatin, 40 mg, Oral, Nightly  ceFAZolin, 2 g, Intravenous, Q8H  chlorhexidine, 15 mL, Mouth/Throat, Q12H  [START ON 5/18/2024] enoxaparin, 40 mg, Subcutaneous, Q24H  magnesium sulfate, 1 g, Intravenous, Q8H  mupirocin, , Each Nare, BID  [START ON 5/18/2024] pantoprazole, 40 mg, Oral, Q AM  pantoprazole, 40 mg, Intravenous, Once  polyethylene glycol, 17 g, Oral, BID  potassium chloride, 20 mEq, Intravenous, Once  potassium phosphate, 15 mmol, Intravenous, Q1H  senna-docusate sodium, 2 tablet, Oral, BID      Continuous Infusions:dexmedetomidine, 0.2-1.5 mcg/kg/hr, Last Rate: 0.2 mcg/kg/hr (05/17/24 1452)  insulin, 0-100 Units/hr, Last Rate: 2.8 Units/hr (05/17/24 1451)  niCARdipine, 5-15 mg/hr, Last Rate: Stopped (05/17/24 1333)  nitroglycerin, 5-50 mcg/min, Last Rate: Stopped (05/17/24 1333)  norepinephrine, 0.02-0.06 mcg/kg/min, Last Rate: Stopped (05/17/24 1256)  sodium chloride, 30 mL/hr, Last Rate: 30 mL/hr (05/17/24 1257)      PRN Meds:.  [START ON 5/18/2024] acetaminophen **OR** [START ON 5/18/2024] acetaminophen **OR** [START ON 5/18/2024] acetaminophen    albumin human    Calcium Replacement - Follow Nurse / BPA Driven Protocol    dextrose    dextrose    glucagon (human recombinant)    HYDROcodone-acetaminophen    Magnesium Cardiology Dose Replacement - Follow Nurse / BPA Driven Protocol     "meperidine    Morphine **AND** naloxone    niCARdipine    nitroglycerin    nitroglycerin    norepinephrine    ondansetron    Phosphorus Replacement - Follow Nurse / BPA Driven Protocol    Potassium Replacement - Follow Nurse / BPA Driven Protocol    vasopressin        VITAL SIGNS  Vitals:    05/17/24 1400 05/17/24 1415 05/17/24 1430 05/17/24 1445   Pulse: 82 82 80 80   Temp: 96.3 °F (35.7 °C) 96.3 °F (35.7 °C) 96.4 °F (35.8 °C) 96.8 °F (36 °C)   SpO2: 97% 97% 97% 97%   Weight:       Height:           Flowsheet Rows      Flowsheet Row First Filed Value   Admission Height 185.4 cm (73\") Documented at 05/17/2024 0600   Admission Weight 76.5 kg (168 lb 10.4 oz) Documented at 05/17/2024 0600             TELEMETRY: Ventricular pacemaker rhythm    Physical Exam:  Constitutional:       Appearance: Well-developed.   Eyes:      General: No scleral icterus.     Conjunctiva/sclera: Conjunctivae normal.      Pupils: Pupils are equal, round, and reactive to light.   HENT:      Head: Normocephalic and atraumatic.   Neck:      Vascular: No carotid bruit or JVD.   Pulmonary:      Effort: Pulmonary effort is normal.      Breath sounds: Normal breath sounds. No wheezing. No rales.   Cardiovascular:      Normal rate. Regular rhythm.   Pulses:     Intact distal pulses.   Abdominal:      General: Bowel sounds are normal.      Palpations: Abdomen is soft.   Musculoskeletal: Normal range of motion.      Cervical back: Normal range of motion and neck supple. Skin:     General: Skin is warm and dry.      Findings: No rash.   Neurological:      Mental Status: Alert.      Comments: No focal deficits          Results Review:   I reviewed the patient's new clinical results.  Lab Results (last 24 hours)       Procedure Component Value Units Date/Time    POC Creatinine [[id number]]  (Normal) Collected: 05/17/24 1422    Specimen: Arterial Blood Updated: 05/17/24 1427     Creatinine 0.77 mg/dL      Comment: Serial Number: [id number]:  [id number]       "  eGFR 89.9 mL/min/1.73     Blood Gas, Arterial - [[id number]]  (Abnormal) Collected: 05/17/24 1422    Specimen: Arterial Blood Updated: 05/17/24 1427     Site Arterial Line     Gilbert's Test N/A     pH, Arterial 7.385 pH units      pCO2, Arterial 38.2 mm Hg      pO2, Arterial 98.7 mm Hg      HCO3, Arterial 22.9 mmol/L      Base Excess, Arterial -1.9 mmol/L      Comment: Serial Number: [id number]:  [id number]        O2 Saturation, Arterial 97.6 %      Barometric Pressure for Blood Gas --     Comment: N/A        Modality Adult Vent     FIO2 40 %      Ventilator Mode AC     Set Tidal Volume 700     PEEP 8     Hemodilution Yes     Respiratory Rate 14    POCT Electrolytes +HGB +HCT [[id number]]  (Abnormal) Collected: 05/17/24 1422    Specimen: Arterial Blood Updated: 05/17/24 1427     Sodium 145 mmol/L      POC Potassium 4.3 mmol/L      Ionized Calcium 1.16 mmol/L      Comment: Serial Number: [id number]:  [id number]        Glucose 202 mg/dL      Hematocrit 33 %      Hemoglobin 11.1 g/dL     POC Glucose Once [[id number]]  (Abnormal) Collected: 05/17/24 1422    Specimen: Arterial Blood Updated: 05/17/24 1427     Glucose 202 mg/dL      Comment: Serial Number: [id number]:  [id number]       POC Creatinine [[id number]]  (Normal) Collected: 05/17/24 1324    Specimen: Arterial Blood Updated: 05/17/24 1328     Creatinine 0.77 mg/dL      Comment: Serial Number: [id number]:  [id number]        eGFR 89.9 mL/min/1.73     Blood Gas, Arterial - [[id number]]  (Abnormal) Collected: 05/17/24 1324    Specimen: Arterial Blood Updated: 05/17/24 1328     Site Arterial Line     Gilbert's Test N/A     pH, Arterial 7.402 pH units      pCO2, Arterial 36.4 mm Hg      pO2, Arterial 96.2 mm Hg      HCO3, Arterial 22.6 mmol/L      Base Excess, Arterial -1.8 mmol/L      Comment: Serial Number: [id number]:  [id number]        O2 Saturation, Arterial 97.5 %      Barometric Pressure for Blood Gas --     Comment: N/A        Modality Adult Vent     FIO2 50 %       Ventilator Mode AC     Set Tidal Volume 700     PEEP 8     Hemodilution Yes     Respiratory Rate 14    POCT Electrolytes +HGB +HCT [[id number]]  (Abnormal) Collected: 05/17/24 1324    Specimen: Arterial Blood Updated: 05/17/24 1328     Sodium 145 mmol/L      POC Potassium 4.0 mmol/L      Ionized Calcium 1.18 mmol/L      Comment: Serial Number: [id number]:  [id number]        Glucose 172 mg/dL      Hematocrit 32 %      Hemoglobin 10.8 g/dL     POC Glucose Once [[id number]]  (Abnormal) Collected: 05/17/24 1324    Specimen: Arterial Blood Updated: 05/17/24 1328     Glucose 172 mg/dL      Comment: Serial Number: [id number]:  [id number]       Blood Gas, Mixed [[id number]]  (Abnormal) Collected: 05/17/24 1229    Specimen: Blood Updated: 05/17/24 1304     pH, mixed 7.37 pH units      PCO2 MIXED 41.3 mmHg      PO2 MIXED 39.8 mmHg      HCO3 MIXED 24.0 mmol/L      CO2 Content 25.3 mmol/L      Base Excess, Arterial -1.2 mmol/L      Comment: Serial Number: [id number]:  [id number]        O2 SATURATION MIXED 73.2 %      Hemodilution Yes     Site [place]     Gilbert's Test N/A     Modality Adult Vent     FIO2 50 %      Set Mech Resp Rate 14     PEEP 8     Base Deficit -1.3 mEq/liter      Ventilator Mode AC    Renal Function Panel [[id number]]  (Abnormal) Collected: 05/17/24 1221    Specimen: Blood Updated: 05/17/24 1303     Glucose 126 mg/dL      BUN 11 mg/dL      Creatinine 0.80 mg/dL      Sodium 145 mmol/L      Potassium 3.9 mmol/L      Chloride 111 mmol/L      CO2 24.0 mmol/L      Calcium 8.4 mg/dL      Albumin 4.0 g/dL      Phosphorus 1.6 mg/dL      Anion Gap 10.0 mmol/L      BUN/Creatinine Ratio 13.8     eGFR 88.9 mL/min/1.73     Narrative:      GFR Normal >60  Chronic Kidney Disease <60  Kidney Failure <15    The GFR formula is only valid for adults with stable renal function between ages 18 and 70.    CBC & Differential [[id number]]  (Abnormal) Collected: 05/17/24 1221    Specimen: Blood Updated: 05/17/24 1303    Narrative:       The following orders were created for panel order CBC & Differential.  Procedure                               Abnormality         Status                     ---------                               -----------         ------                     CBC Auto Differential[[id number]]        Abnormal            Final result               Scan Slide[[id number]]                                       Final result                 Please view results for these tests on the individual orders.    CBC Auto Differential [[id number]]  (Abnormal) Collected: 05/17/24 1221    Specimen: Blood Updated: 05/17/24 1303     WBC 10.29 10*3/mm3      RBC 3.62 10*6/mm3      Hemoglobin 11.1 g/dL      Hematocrit 33.2 %      MCV 91.7 fL      MCH 30.7 pg      MCHC 33.4 g/dL      RDW 12.4 %      RDW-SD 41.3 fl      MPV 9.4 fL      Platelets 129 10*3/mm3     Narrative:      The previously reported component NRBC is no longer being reported. Previous result was 0.0 /100 WBC (Reference Range: 0.0-0.2 /100 WBC) on 5/17/2024 at 1230 EDT.    Scan Slide [[id number]] Collected: 05/17/24 1221    Specimen: Blood Updated: 05/17/24 1303     Scan Slide --     Comment: See Manual Differential Results       Manual Differential [[id number]]  (Abnormal) Collected: 05/17/24 1221    Specimen: Blood Updated: 05/17/24 1303     Neutrophil % 68.0 %      Lymphocyte % 6.0 %      Monocyte % 3.0 %      Bands %  18.0 %      Metamyelocyte % 5.0 %      Neutrophils Absolute 8.85 10*3/mm3      Lymphocytes Absolute 0.62 10*3/mm3      Monocytes Absolute 0.31 10*3/mm3      [place] Cells Slight/1+     Toxic Granulation Slight/1+     Platelet Estimate Decreased    Calcium, Ionized [[id number]]  (Abnormal) Collected: 05/17/24 1221    Specimen: Blood Updated: 05/17/24 1244     Ionized Calcium 1.17 mmol/L     Protime-INR [[id number]]  (Normal) Collected: 05/17/24 1221    Specimen: Blood Updated: 05/17/24 1240     Protime 11.5 Seconds      INR 1.06    aPTT [[id number]]  (Normal) Collected: 05/17/24  1221    Specimen: Blood Updated: 05/17/24 1240     PTT 25.6 seconds     Blood Gas, Arterial - [[id number]]  (Abnormal) Collected: 05/17/24 1218    Specimen: Arterial Blood Updated: 05/17/24 1226     Site Arterial Line     Gilbert's Test N/A     pH, Arterial 7.421 pH units      pCO2, Arterial 38.9 mm Hg      pO2, Arterial 214.2 mm Hg      HCO3, Arterial 25.3 mmol/L      Base Excess, Arterial 0.9 mmol/L      Comment: Serial Number: [id number]:  [id number]        O2 Saturation, Arterial 99.8 %      Barometric Pressure for Blood Gas --     Comment: N/A        Modality Adult Vent     FIO2 70 %      Ventilator Mode AC     Set Tidal Volume 700     PEEP 8     Hemodilution Yes     Respiratory Rate 14    POC Creatinine [[id number]]  (Normal) Collected: 05/17/24 1218    Specimen: Arterial Blood Updated: 05/17/24 1226     Creatinine 0.75 mg/dL      Comment: Serial Number: [id number]:  [id number]        eGFR 90.7 mL/min/1.73     POCT Electrolytes +HGB +HCT [[id number]]  (Abnormal) Collected: 05/17/24 1218    Specimen: Arterial Blood Updated: 05/17/24 1226     Sodium 146 mmol/L      POC Potassium 3.9 mmol/L      Ionized Calcium 1.18 mmol/L      Comment: Serial Number: [id number]:  [id number]        Glucose 123 mg/dL      Hematocrit 32 %      Hemoglobin 11.0 g/dL     POC Glucose Once [[id number]]  (Abnormal) Collected: 05/17/24 1218    Specimen: Arterial Blood Updated: 05/17/24 1226     Glucose 123 mg/dL      Comment: Serial Number: [id number]:  [id number]       POC Activated Clotting Time [[id number]]  (Normal) Collected: 05/17/24 1035    Specimen: Blood Updated: 05/17/24 1159     Activated Clotting Time  116 Seconds      Comment: Serial Number: [id number]:  [id number]       POC Activated Clotting Time [[id number]]  (Abnormal) Collected: 05/17/24 1001    Specimen: Blood Updated: 05/17/24 1158     Activated Clotting Time  348 Seconds      Comment: Serial Number: [id number]:  [id number]       POC Activated Clotting Time [[id number]]  (Abnormal)  Collected: 05/17/24 0931    Specimen: Arterial Blood Updated: 05/17/24 1158     Activated Clotting Time  397 Seconds      Comment: Serial Number: [id number]:  [id number]       POC Activated Clotting Time [[id number]]  (Abnormal) Collected: 05/17/24 0901    Specimen: Arterial Blood Updated: 05/17/24 1158     Activated Clotting Time  342 Seconds      Comment: Serial Number: [id number]:  [id number]       POC Activated Clotting Time [[id number]]  (Abnormal) Collected: 05/17/24 0840    Specimen: Arterial Blood Updated: 05/17/24 1158     Activated Clotting Time  415 Seconds      Comment: Serial Number: [id number]:  [id number]       POC Activated Clotting Time [[id number]]  (Abnormal) Collected: 05/17/24 0746    Specimen: Arterial Blood Updated: 05/17/24 1158     Activated Clotting Time  140 Seconds      Comment: Serial Number: [id number]:  [id number]       POC Chem 8, arterial (ISTAT) [[id number]]  (Abnormal) Collected: 05/17/24 1036    Specimen: Arterial Blood Updated: 05/17/24 1148     Ionized Calcium 1.29 mmol/L      pH, Arterial 7.330 pH units      pCO2, Arterial 47.0 mm Hg      pO2, Arterial 365.0 mm Hg      HCO3, Arterial 24.7 mmol/L      Base Excess, Arterial <0.0 mmol/L      Base Deficit --     O2 Saturation, Arterial 100.0 %      Glucose 109 mg/dL      Comment: Serial Number: [id number]:  [id number]        Sodium 142 mmol/L      POC Potassium 3.8 mmol/L      Hematocrit 28 %      Hemoglobin 9.5 g/dL      CO2 Content 26 mmol/L     POC Chem 8, arterial (ISTAT) [[id number]]  (Abnormal) Collected: 05/17/24 1001    Specimen: Arterial Blood Updated: 05/17/24 1147     Ionized Calcium 1.07 mmol/L      pH, Arterial 7.330 pH units      pCO2, Arterial 46.2 mm Hg      pO2, Arterial 428.0 mm Hg      HCO3, Arterial 24.2 mmol/L      Base Excess, Arterial <0.0 mmol/L      Base Deficit --     O2 Saturation, Arterial 100.0 %      Glucose 145 mg/dL      Comment: Serial Number: [id number]:  [id number]        Sodium 142 mmol/L      POC Potassium  4.1 mmol/L      Hematocrit 26 %      Hemoglobin 8.8 g/dL      CO2 Content 26 mmol/L     POC Chem 8, arterial (ISTAT) [[id number]]  (Abnormal) Collected: 05/17/24 0932    Specimen: Arterial Blood Updated: 05/17/24 1147     Ionized Calcium 1.05 mmol/L      pH, Arterial 7.330 pH units      pCO2, Arterial 51.5 mm Hg      pO2, Arterial 454.0 mm Hg      HCO3, Arterial 27.4 mmol/L      Base Excess, Arterial 2.0 mmol/L      Base Deficit --     O2 Saturation, Arterial 100.0 %      Glucose 168 mg/dL      Comment: Serial Number: [id number]:  [id number]        Sodium 140 mmol/L      POC Potassium 3.8 mmol/L      Hematocrit 29 %      Hemoglobin 9.9 g/dL      CO2 Content 29 mmol/L     POC Chem 8, arterial (ISTAT) [[id number]]  (Abnormal) Collected: 05/17/24 1121    Specimen: Arterial Blood Updated: 05/17/24 1146     Ionized Calcium 1.25 mmol/L      pH, Arterial 7.350 pH units      pCO2, Arterial 42.7 mm Hg      pO2, Arterial 230.0 mm Hg      HCO3, Arterial 23.4 mmol/L      Base Excess, Arterial <0.0 mmol/L      Base Deficit --     O2 Saturation, Arterial 100.0 %      Glucose 77 mg/dL      Comment: Serial Number: [id number]:  [id number]        Sodium 144 mmol/L      POC Potassium 3.7 mmol/L      Hematocrit 30 %      Hemoglobin 10.2 g/dL      CO2 Content 25 mmol/L     POC Chem Panel [[id number]]  (Abnormal) Collected: 05/17/24 0908    Specimen: Venous Blood Updated: 05/17/24 1146     Glucose 163 mg/dL      Comment: Serial Number: [id number]:  [id number]        Sodium 137 mmol/L      POC Potassium 4.1 mmol/L      Ionized Calcium 0.96 mmol/L      Hematocrit 25 %      Hemoglobin 8.5 g/dL      pH, Venous 7.320 pH Units      pCO2, Venous 52.5 mm Hg      CO2 CONTENT  --     HCO3, Venous 27.2 mmol/L      Base Excess, Venous --     Base Deficit --     O2 Saturation, Venous 29.0 %      pO2, Venous 39.0 mm Hg     POC Chem 8, arterial (ISTAT) [[id number]]  (Abnormal) Collected: 05/17/24 0902    Specimen: Arterial Blood Updated: 05/17/24 1146      Ionized Calcium 1.00 mmol/L      pH, Arterial 7.360 pH units      pCO2, Arterial 42.7 mm Hg      pO2, Arterial 449.0 mm Hg      HCO3, Arterial 24.3 mmol/L      Base Excess, Arterial <0.0 mmol/L      Base Deficit --     O2 Saturation, Arterial 100.0 %      Glucose 154 mg/dL      Comment: Serial Number: [id number]:  [id number]        Sodium 139 mmol/L      POC Potassium 4.9 mmol/L      Hematocrit 25 %      Hemoglobin 8.5 g/dL      CO2 Content 26 mmol/L     POC Chem 8, arterial (ISTAT) [[id number]]  (Abnormal) Collected: 05/17/24 0747    Specimen: Arterial Blood Updated: 05/17/24 1146     Ionized Calcium 1.20 mmol/L      pH, Arterial 7.380 pH units      pCO2, Arterial 40.5 mm Hg      pO2, Arterial 415.0 mm Hg      HCO3, Arterial 24.1 mmol/L      Base Excess, Arterial <0.0 mmol/L      Base Deficit --     O2 Saturation, Arterial 100.0 %      Glucose 157 mg/dL      Comment: Serial Number: [id number]:  [id number]        Sodium 141 mmol/L      POC Potassium 3.7 mmol/L      Hematocrit 33 %      Hemoglobin 11.2 g/dL      CO2 Content 25 mmol/L     Protime-INR [[id number]]  (Abnormal) Collected: 05/17/24 1029    Specimen: Blood, Arterial Line Updated: 05/17/24 1057     Protime 14.9 Seconds      INR 1.40    aPTT [[id number]]  (Normal) Collected: 05/17/24 1029    Specimen: Blood, Arterial Line Updated: 05/17/24 1057     PTT 25.9 seconds     Fibrinogen [[id number]]  (Abnormal) Collected: 05/17/24 1029    Specimen: Blood, Arterial Line Updated: 05/17/24 1057     Fibrinogen 194 mg/dL     Platelet Function ADP [[id number]]  (Normal) Collected: 05/17/24 1029    Specimen: Blood, Arterial Line Updated: 05/17/24 1048     ADP Aggregation, % Platelet 95 %     CBC (No Diff) [[id number]]  (Abnormal) Collected: 05/17/24 1029    Specimen: Blood, Arterial Line Updated: 05/17/24 1047     WBC 8.61 10*3/mm3      RBC 3.12 10*6/mm3      Hemoglobin 9.6 g/dL      Hematocrit 29.1 %      MCV 93.3 fL      MCH 30.8 pg      MCHC 33.0 g/dL      RDW 12.4 %       RDW-SD 42.2 fl      MPV 9.4 fL      Platelets 137 10*3/mm3     POC Glucose Once [[id number]]  (Abnormal) Collected: 05/17/24 0552    Specimen: Blood Updated: 05/17/24 0553     Glucose 169 mg/dL      Comment: Serial Number: [id number]:  [id number]               Imaging Results (Last 24 Hours)       Procedure Component Value Units Date/Time    XR Abdomen KUB [[id number]] Collected: 05/17/24 1252     Updated: 05/17/24 1258    Narrative:      XR ABDOMEN KUB    Date of Exam: 5/17/2024 12:29 PM EDT    Indication: OGT    Comparison: None available.    Findings:  Orogastric tube is in place the tip projecting over the fundus of the stomach. Proximal sideport is near the level of the gastroesophageal junction. There are mediastinal drains and left basilar thoracostomy tubes in place. No definite basilar   pneumothorax or pleural effusion. Bowel gas pattern is within normal limits.      Impression:      Impression:    1. Orogastric tube in place the tip projecting over the fundus of the stomach. Proximal sideport is near the level of the gastroesophageal junction.      Electronically Signed: King Charles MD    5/17/2024 12:55 PM EDT    Workstation ID: [id number]    XR Chest 1 View [[id number]] Collected: 05/17/24 1249     Updated: 05/17/24 1257    Narrative:      XR CHEST 1 VW    Date of Exam: 5/17/2024 12:24 PM EDT    Indication: Post-Op Check Line & Tube Placement    Comparison: Two-view chest x-ray 5/14/2024.    Findings:  There've been interval postoperative changes from median sternotomy, CABG, and valve replacement. Tip of the endotracheal tube terminates in the midthoracic trachea approximately 5 cm above the arturo. Tip of the [place] catheter terminates in the   pulmonary outflow tract. Tip of the enteric tube terminates just below the level of the diaphragm. Sideport is located above the level of diaphragm. There are chest tubes in place. There is a deep sulcus sign on the left on the supine image, as well as    pleural line visible at the left upper chest with degree of pleural separation measuring 5 mm. The right upper chest is obscured by a defibrillator pad, but there is a possible right apical pleural line with degree of pleural separation potentially   measuring 2 mm.    There are bilateral perihilar and left basilar airspace opacities. Heart size is not enlarged.      Impression:      Impression:  1.Interval postoperative changes with positioning of support tubes and lines, as above. Of note, the tip of the enteric tube terminates just below the level of the diaphragm. Sideport is above the level of diaphragm. Consider further advancement into the   stomach.  2.Left pneumothorax, which may be small, but is difficult to quantify on this supine image. Possible tiny right pneumothorax.  3.Patchy bilateral airspace opacities, likely due to atelectasis.      Electronically Signed: Carine Braxton    5/17/2024 12:55 PM EDT    Workstation ID: [id number]            EKG      I personally viewed and interpreted the patient's EKG/Telemetry data:    ECHOCARDIOGRAM:  Results for orders placed during the hospital encounter of 05/08/24    Adult Transesophageal Echo (LEO) W/ Cont if Necessary Per Protocol    Interpretation Summary    Left ventricular ejection fraction appears to be 61 - 65%.    Saline test results are negative.    There are myxomatous changes of the mitral valve apparatus present.    Severe mitral valve regurgitation is present.       STRESS MYOVIEW:       CARDIAC CATHETERIZATION:  Results for orders placed during the hospital encounter of 05/08/24    Cardiac Catheterization/Vascular Study       OTHER:         Assessment & Plan     #1 mitral valve disease  Patient had severe mitral regurgitation by echo and possible echocardiogram and hence underwent mitral valve repair and is currently stable.  Patient is intubated and sedation has been weaned off  Patient has chest tube pressure draining very well  Patient's urine  output is good  Blood pressure and heart rate are stable.    2.  Coronary disease  Patient had single-vessel coronary disease underwent a coronary bypass surgery x 1 vessel and is currently stable    3.  Hypertension  Patient blood pressure is currently stable and will be restarted on home medications manage extubated    4.  Diabetes  Patient was on metformin which is currently on sliding scale for now and will be restarted on home medicines and is extubated    5.  Hyperlipidemia  Patient is on statin.        I discussed the patients findings and my recommendations with patient's nurse  Lenny Gilman MD  05/17/24  14:53 EDT

## 2024-05-17 NOTE — ANESTHESIA PROCEDURE NOTES
Intra-Op Anesthesia LEO    Procedure Performed: Intra-Op Anesthesia LEO       Start Time:        End Time:      Preanesthesia Checklist:  Patient identified, IV assessed, risks and benefits discussed, monitors and equipment assessed, procedure being performed at surgeon's request and anesthesia consent obtained.    General Procedure Information  Diagnostic Indications for Echo:  assessment of surgical repair and hemodynamic monitoring  Location performed:  OR  Intubated  Bite block placed  Heart visualized  Probe Insertion:  Easy  Probe Type:  Biplane and multiplane  Modalities:  Color flow mapping, pulse wave Doppler and continuous wave Doppler    Echocardiographic and Doppler Measurements    Ventricles    Right Ventricle:  Cavity size normal.  Hypertrophy not present.  Thrombus not present.  Global function normal.  Ejection Fraction 50%.    Left Ventricle:  Cavity size normal.  Thrombus not present.  Global Function normal.  Ejection Fraction 60%.      Ventricular Regional Function:  1- Basal Anteroseptal:  normal  2- Basal Anterior:  normal  3- Basal Anterolateral:  normal  4- Basal Inferolateral:  normal  5- Basal Inferior:  normal  6- Basal Inferoseptal:  normal  7- Mid Anteroseptal:  normal  8- Mid Anterior:  normal  9- Mid Anterolateral:  normal  10- Mid Inferolateral:  normal  11- Mid Inferior:  normal  12- Mid Inferoseptal:  normal  13- Apical Anterior:  normal  14- Apical Lateral:  normal  15- Apical Inferior:  normal  16- Apical Septal:  normal  17- [place]:  normal      Valves    Aortic Valve:  Annulus normal.  Stenosis not present.  Regurgitation absent.  Leaflets normal.  Leaflet motions normal.      Mitral Valve:  Annulus normal.  Stenosis not present.  Regurgitation severe.  Leaflets normal.  Leaflet motions prolapsed.  Specific leaflet segments with abnormal motions are described in the following comments:       P2    Tricuspid Valve:  Annulus normal.  Stenosis not present.  Regurgitation absent.   Leaflets normal.  Leaflet motions normal.    Pulmonic Valve:  Annulus normal.  Stenosis not present.  Regurgitation absent.        Aorta    Ascending Aorta:  Size normal.  Dissection not present.  Plaque thickness less than 3 mm.  Mobile plaque not present.    Aortic Arch:  Size normal.  Dissection not present.  Plaque thickness less than 3 mm.  Mobile plaque not present.    Descending Aorta:  Size normal.  Dissection not present.  Plaque thickness less than 3 mm.  Mobile plaque not present.        Atria    Right Atrium:  Size normal.    Left Atrium:  Size normal.  Spontaneous echo contrast not present.  Thrombus not present.  Tumor not present.  Device not present.    Left atrial appendage normal.      Septa        Ventricular Septum:  Intra-ventricular septum morphology normal.          Other Findings  Pericardium:  normal  Pleural Effusion:  none  Pulmonary Arteries:  normal      Anesthesia Information  Performed Personally  Anesthesiologist:  Wilber Del Valle MD      Echocardiogram Comments:       LEO placed easily no resistance, lubricated, bite guard      Pre cpb LEO  LV no wma EF 60  RV low nl SF   MV P2>P3 prolapse with ant and mainly posteriorly directed jet , mod sev MR +coanda  AV TV wnl       S/p cabg x 1 MV ring   LV no wma EF 60+  RV nl SF   TV mild mod TR   AV wnl   MV ring/ repair no MR

## 2024-05-17 NOTE — ANESTHESIA PROCEDURE NOTES
Arterial Line      Start time: 5/17/2024 6:58 AM  Stop Time:5/17/2024 7:03 AM       Line placed for hemodynamic monitoring and ABGs/Labs/ISTAT.  Performed By   Anesthesiologist: Wilber Del Valle MD   Preanesthetic Checklist  Completed: patient identified, IV checked, site marked, risks and benefits discussed, surgical consent, monitors and equipment checked, pre-op evaluation and timeout performed  Arterial Line Prep    Sterile Tech: cap, gloves, sterile barriers, gown and mask  Prep: ChloraPrep  Patient monitoring: EKG, continuous pulse oximetry and blood pressure monitoring  Arterial Line Procedure   Laterality:left  Location:  radial artery  Catheter size: 20 G   Guidance: palpation technique  Number of attempts: 1  Successful placement: yes   Post Assessment   Dressing Type: wrist guard applied, secured with tape and occlusive dressing applied.   Complications no  Circ/Move/Sens Assessment: normal and unchanged.   Patient Tolerance: patient tolerated the procedure well with no apparent complications  Additional Notes  Sterile seldinger technique, tolerated well

## 2024-05-18 ENCOUNTER — APPOINTMENT (OUTPATIENT)
Dept: GENERAL RADIOLOGY | Facility: HOSPITAL | Age: 84
DRG: 219 | End: 2024-05-18
Payer: MEDICARE

## 2024-05-18 LAB
25(OH)D3 SERPL-MCNC: 18.6 NG/ML (ref 30–100)
ALBUMIN SERPL-MCNC: 3.8 G/DL (ref 3.5–5.2)
ANION GAP SERPL CALCULATED.3IONS-SCNC: 7 MMOL/L (ref 5–15)
ARTERIAL PATENCY WRIST A: ABNORMAL
ARTERIAL PATENCY WRIST A: ABNORMAL
ATMOSPHERIC PRESS: ABNORMAL MM[HG]
BASE DEFICIT: -3.4 MEQ/LITER
BASE EXCESS BLDA CALC-SCNC: -3.1 MMOL/L (ref 0–3)
BASE EXCESS BLDA CALC-SCNC: -3.2 MMOL/L (ref 0–3)
BASOPHILS # BLD AUTO: 0.03 10*3/MM3 (ref 0–0.2)
BASOPHILS NFR BLD AUTO: 0.3 % (ref 0–1.5)
BDY SITE: ABNORMAL
BDY SITE: ABNORMAL
BH BB BLOOD EXPIRATION DATE: NORMAL
BH BB BLOOD EXPIRATION DATE: NORMAL
BH BB BLOOD TYPE BARCODE: 5100
BH BB BLOOD TYPE BARCODE: 5100
BH BB DISPENSE STATUS: NORMAL
BH BB DISPENSE STATUS: NORMAL
BH BB PRODUCT CODE: NORMAL
BH BB PRODUCT CODE: NORMAL
BH BB UNIT NUMBER: NORMAL
BH BB UNIT NUMBER: NORMAL
BUN SERPL-MCNC: 13 MG/DL (ref 8–23)
BUN/CREAT SERPL: 15.9 (ref 7–25)
CA-I SERPL ISE-MCNC: 1.21 MMOL/L (ref 1.2–1.3)
CALCIUM SPEC-SCNC: 8.5 MG/DL (ref 8.6–10.5)
CHLORIDE SERPL-SCNC: 111 MMOL/L (ref 98–107)
CO2 BLDA-SCNC: 23.5 MMOL/L (ref 22–29)
CO2 BLDA-SCNC: 23.7 MMOL/L (ref 22–29)
CO2 SERPL-SCNC: 23 MMOL/L (ref 22–29)
CREAT SERPL-MCNC: 0.82 MG/DL (ref 0.76–1.27)
DEPRECATED RDW RBC AUTO: 44.9 FL (ref 37–54)
EGFRCR SERPLBLD CKD-EPI 2021: 88.3 ML/MIN/1.73
EOSINOPHIL # BLD AUTO: 0.06 10*3/MM3 (ref 0–0.4)
EOSINOPHIL NFR BLD AUTO: 0.6 % (ref 0.3–6.2)
ERYTHROCYTE [DISTWIDTH] IN BLOOD BY AUTOMATED COUNT: 13 % (ref 12.3–15.4)
GLUCOSE BLDC GLUCOMTR-MCNC: 116 MG/DL (ref 70–105)
GLUCOSE BLDC GLUCOMTR-MCNC: 126 MG/DL (ref 70–105)
GLUCOSE BLDC GLUCOMTR-MCNC: 129 MG/DL (ref 70–105)
GLUCOSE BLDC GLUCOMTR-MCNC: 132 MG/DL (ref 70–105)
GLUCOSE BLDC GLUCOMTR-MCNC: 134 MG/DL (ref 70–105)
GLUCOSE BLDC GLUCOMTR-MCNC: 140 MG/DL (ref 70–105)
GLUCOSE BLDC GLUCOMTR-MCNC: 154 MG/DL (ref 70–105)
GLUCOSE BLDC GLUCOMTR-MCNC: 183 MG/DL (ref 70–105)
GLUCOSE BLDC GLUCOMTR-MCNC: 194 MG/DL (ref 70–105)
GLUCOSE BLDC GLUCOMTR-MCNC: 207 MG/DL (ref 70–105)
GLUCOSE BLDC GLUCOMTR-MCNC: 239 MG/DL (ref 70–105)
GLUCOSE BLDC GLUCOMTR-MCNC: 240 MG/DL (ref 70–105)
GLUCOSE BLDC GLUCOMTR-MCNC: 299 MG/DL (ref 70–105)
GLUCOSE BLDC GLUCOMTR-MCNC: 321 MG/DL (ref 70–105)
GLUCOSE BLDC GLUCOMTR-MCNC: 327 MG/DL (ref 70–105)
GLUCOSE SERPL-MCNC: 135 MG/DL (ref 65–99)
HCO3 BLDA-SCNC: 22.3 MMOL/L (ref 21–28)
HCO3 MIXED: 22.4 MMOL/L (ref 21–29)
HCT VFR BLD AUTO: 28.9 % (ref 37.5–51)
HEMODILUTION: NO
HEMODILUTION: NO
HGB BLD-MCNC: 9.5 G/DL (ref 13–17.7)
IMM GRANULOCYTES # BLD AUTO: 0.03 10*3/MM3 (ref 0–0.05)
IMM GRANULOCYTES NFR BLD AUTO: 0.3 % (ref 0–0.5)
INHALED O2 CONCENTRATION: 21 %
INHALED O2 CONCENTRATION: 28 %
INR PPP: 1.03 (ref 0.93–1.1)
LYMPHOCYTES # BLD AUTO: 0.7 10*3/MM3 (ref 0.7–3.1)
LYMPHOCYTES NFR BLD AUTO: 7.3 % (ref 19.6–45.3)
MAGNESIUM SERPL-MCNC: 2.9 MG/DL (ref 1.6–2.4)
MCH RBC QN AUTO: 30.7 PG (ref 26.6–33)
MCHC RBC AUTO-ENTMCNC: 32.9 G/DL (ref 31.5–35.7)
MCV RBC AUTO: 93.5 FL (ref 79–97)
MODALITY: ABNORMAL
MODALITY: ABNORMAL
MONOCYTES # BLD AUTO: 0.89 10*3/MM3 (ref 0.1–0.9)
MONOCYTES NFR BLD AUTO: 9.2 % (ref 5–12)
NEUTROPHILS NFR BLD AUTO: 7.92 10*3/MM3 (ref 1.7–7)
NEUTROPHILS NFR BLD AUTO: 82.3 % (ref 42.7–76)
NRBC BLD AUTO-RTO: 0 /100 WBC (ref 0–0.2)
O2 SATURATION MIXED: 75 %
PCO2 BLDA: 40.3 MM HG (ref 35–48)
PCO2 MIXED: 41.8 MMHG (ref 35–51)
PH BLDA: 7.35 PH UNITS (ref 7.35–7.45)
PH MIXED: 7.34 PH UNITS (ref 7.32–7.45)
PHOSPHATE SERPL-MCNC: 3.8 MG/DL (ref 2.5–4.5)
PLATELET # BLD AUTO: 96 10*3/MM3 (ref 140–450)
PMV BLD AUTO: 9.9 FL (ref 6–12)
PO2 BLDA: 66.4 MM HG (ref 83–108)
PO2 MIXED: 42.6 MMHG
POTASSIUM SERPL-SCNC: 4.2 MMOL/L (ref 3.5–5.2)
PROTHROMBIN TIME: 11.2 SECONDS (ref 9.6–11.7)
RBC # BLD AUTO: 3.09 10*6/MM3 (ref 4.14–5.8)
SAO2 % BLDCOA: 91.9 % (ref 94–98)
SODIUM SERPL-SCNC: 141 MMOL/L (ref 136–145)
UNIT  ABO: NORMAL
UNIT  ABO: NORMAL
UNIT  RH: NORMAL
UNIT  RH: NORMAL
WBC NRBC COR # BLD AUTO: 9.63 10*3/MM3 (ref 3.4–10.8)

## 2024-05-18 PROCEDURE — 25010000002 ALBUMIN HUMAN 5% PER 50 ML: Performed by: NURSE PRACTITIONER

## 2024-05-18 PROCEDURE — 97163 PT EVAL HIGH COMPLEX 45 MIN: CPT

## 2024-05-18 PROCEDURE — 25010000002 CALCIUM GLUCONATE-NACL 1-0.675 GM/50ML-% SOLUTION

## 2024-05-18 PROCEDURE — 71045 X-RAY EXAM CHEST 1 VIEW: CPT

## 2024-05-18 PROCEDURE — 80069 RENAL FUNCTION PANEL: CPT | Performed by: NURSE PRACTITIONER

## 2024-05-18 PROCEDURE — 25010000002 ACETAMINOPHEN 10 MG/ML SOLUTION: Performed by: NURSE PRACTITIONER

## 2024-05-18 PROCEDURE — 25810000003 SODIUM CHLORIDE 0.9 % SOLUTION: Performed by: NURSE PRACTITIONER

## 2024-05-18 PROCEDURE — 25010000002 MAGNESIUM SULFATE IN D5W 1G/100ML (PREMIX) 1-5 GM/100ML-% SOLUTION: Performed by: NURSE PRACTITIONER

## 2024-05-18 PROCEDURE — 25010000002 CEFAZOLIN PER 500 MG: Performed by: NURSE PRACTITIONER

## 2024-05-18 PROCEDURE — 85610 PROTHROMBIN TIME: CPT | Performed by: NURSE PRACTITIONER

## 2024-05-18 PROCEDURE — 82306 VITAMIN D 25 HYDROXY: CPT

## 2024-05-18 PROCEDURE — 83735 ASSAY OF MAGNESIUM: CPT | Performed by: NURSE PRACTITIONER

## 2024-05-18 PROCEDURE — 93005 ELECTROCARDIOGRAM TRACING: CPT | Performed by: NURSE PRACTITIONER

## 2024-05-18 PROCEDURE — 97167 OT EVAL HIGH COMPLEX 60 MIN: CPT

## 2024-05-18 PROCEDURE — 82803 BLOOD GASES ANY COMBINATION: CPT

## 2024-05-18 PROCEDURE — P9041 ALBUMIN (HUMAN),5%, 50ML: HCPCS | Performed by: NURSE PRACTITIONER

## 2024-05-18 PROCEDURE — 93010 ELECTROCARDIOGRAM REPORT: CPT | Performed by: INTERNAL MEDICINE

## 2024-05-18 PROCEDURE — 85025 COMPLETE CBC W/AUTO DIFF WBC: CPT | Performed by: NURSE PRACTITIONER

## 2024-05-18 PROCEDURE — 82948 REAGENT STRIP/BLOOD GLUCOSE: CPT

## 2024-05-18 PROCEDURE — 99232 SBSQ HOSP IP/OBS MODERATE 35: CPT | Performed by: INTERNAL MEDICINE

## 2024-05-18 PROCEDURE — 82330 ASSAY OF CALCIUM: CPT | Performed by: NURSE PRACTITIONER

## 2024-05-18 RX ORDER — CALCIUM GLUCONATE 20 MG/ML
1000 INJECTION, SOLUTION INTRAVENOUS ONCE
Status: COMPLETED | OUTPATIENT
Start: 2024-05-18 | End: 2024-05-18

## 2024-05-18 RX ORDER — ENOXAPARIN SODIUM 100 MG/ML
40 INJECTION SUBCUTANEOUS EVERY 24 HOURS
Status: DISCONTINUED | OUTPATIENT
Start: 2024-05-19 | End: 2024-05-22 | Stop reason: HOSPADM

## 2024-05-18 RX ADMIN — ATORVASTATIN CALCIUM 40 MG: 40 TABLET, FILM COATED ORAL at 21:28

## 2024-05-18 RX ADMIN — MUPIROCIN 1 APPLICATION: 20 OINTMENT TOPICAL at 08:57

## 2024-05-18 RX ADMIN — SODIUM CHLORIDE 2 G: 900 INJECTION INTRAVENOUS at 15:33

## 2024-05-18 RX ADMIN — ACETAMINOPHEN 1000 MG: 10 INJECTION INTRAVENOUS at 02:03

## 2024-05-18 RX ADMIN — PANTOPRAZOLE SODIUM 40 MG: 40 TABLET, DELAYED RELEASE ORAL at 05:38

## 2024-05-18 RX ADMIN — MUPIROCIN 1 APPLICATION: 20 OINTMENT TOPICAL at 21:28

## 2024-05-18 RX ADMIN — CHLORHEXIDINE GLUCONATE, 0.12% ORAL RINSE 15 ML: 1.2 SOLUTION DENTAL at 21:28

## 2024-05-18 RX ADMIN — SODIUM CHLORIDE 2 G: 900 INJECTION INTRAVENOUS at 00:02

## 2024-05-18 RX ADMIN — SENNOSIDES AND DOCUSATE SODIUM 2 TABLET: 50; 8.6 TABLET ORAL at 21:28

## 2024-05-18 RX ADMIN — SODIUM CHLORIDE 2 G: 900 INJECTION INTRAVENOUS at 08:58

## 2024-05-18 RX ADMIN — MAGNESIUM SULFATE IN DEXTROSE 1 G: 10 INJECTION, SOLUTION INTRAVENOUS at 11:39

## 2024-05-18 RX ADMIN — SENNOSIDES AND DOCUSATE SODIUM 2 TABLET: 50; 8.6 TABLET ORAL at 08:58

## 2024-05-18 RX ADMIN — CHLORHEXIDINE GLUCONATE, 0.12% ORAL RINSE 15 ML: 1.2 SOLUTION DENTAL at 08:57

## 2024-05-18 RX ADMIN — MAGNESIUM SULFATE IN DEXTROSE 1 G: 10 INJECTION, SOLUTION INTRAVENOUS at 01:04

## 2024-05-18 RX ADMIN — SODIUM CHLORIDE 30 ML/HR: 9 INJECTION, SOLUTION INTRAVENOUS at 16:27

## 2024-05-18 RX ADMIN — CALCIUM GLUCONATE 1000 MG: 20 INJECTION, SOLUTION INTRAVENOUS at 08:58

## 2024-05-18 RX ADMIN — POLYETHYLENE GLYCOL 3350 17 G: 17 POWDER, FOR SOLUTION ORAL at 21:28

## 2024-05-18 RX ADMIN — ASPIRIN 81 MG: 81 TABLET, COATED ORAL at 08:59

## 2024-05-18 RX ADMIN — ALBUMIN (HUMAN) 12.5 G: 12.5 INJECTION, SOLUTION INTRAVENOUS at 04:04

## 2024-05-18 RX ADMIN — HYDROCODONE BITARTRATE AND ACETAMINOPHEN 1 TABLET: 5; 325 TABLET ORAL at 05:38

## 2024-05-18 RX ADMIN — POLYETHYLENE GLYCOL 3350 17 G: 17 POWDER, FOR SOLUTION ORAL at 08:58

## 2024-05-18 RX ADMIN — HYDROCODONE BITARTRATE AND ACETAMINOPHEN 1 TABLET: 5; 325 TABLET ORAL at 15:33

## 2024-05-18 NOTE — PLAN OF CARE
Goal Outcome Evaluation:  Plan of Care Reviewed With: patient, spouse        Progress: improving  Outcome Evaluation: Pt is 84 y/o M admitted for CABG, MVR 2* valve and coronary artery disease. Pt is POD 1 and recovering well so far. He is very eager to advance his activity tolerance and will do well at home with spouse at d/c. Ot will follow to teach HEP and adaptive ADL & mobility skills.      Anticipated Discharge Disposition (OT): home with assist

## 2024-05-18 NOTE — PROGRESS NOTES
Cardiology Progress Note      PATIENT IDENTIFICATION    Name: Maurice Alvarado  Age: 83 y.o. Sex: male : 1940  MRN: [id number]    Requesting Provider    Sean Grier MD     LOS: 1 day       Reason For Followup:  Coronary artery disease  Valvular heart disease    Subjective:    Interval History:  Seen and examined.  Chart and labs reviewed.  Patient reports some mild sternal discomfort.  Remains atrial paced.    Review of Systems:  A complete review of systems was undertaken with the pertinent cardiovascular findings listed in history of present illness and all other systems being negative.     Assessment & Plan    Impressions:  Coronary artery disease status post single-vessel CABG this admission     LIMA-LAD  Valvular heart disease status post mitral valve repair, and left atrial appendage ligation this admission  History of hypertension  Diabetes  Hyperlipidemia    Recommendations:  Continue with routine postoperative care  Pacer wires as per surgery  Will hold off on initiating beta-blocker until confident that we will no longer need pacing  Increase activity as tolerated  Monitor rate and rhythm        Objective:    Medication Review:   Scheduled Meds:aspirin, 81 mg, Oral, Daily  atorvastatin, 40 mg, Oral, Nightly  ceFAZolin, 2 g, Intravenous, Q8H  chlorhexidine, 15 mL, Mouth/Throat, Q12H  [START ON 2024] enoxaparin, 40 mg, Subcutaneous, Q24H  mupirocin, , Each Nare, BID  pantoprazole, 40 mg, Oral, Q AM  polyethylene glycol, 17 g, Oral, BID  senna-docusate sodium, 2 tablet, Oral, BID      Continuous Infusions:dexmedetomidine, 0.2-1.5 mcg/kg/hr, Last Rate: Stopped (24 1607)  insulin, 0-100 Units/hr, Last Rate: 0.5 Units/hr (24 1140)  niCARdipine, 5-15 mg/hr, Last Rate: Stopped (24 1333)  nitroglycerin, 5-50 mcg/min, Last Rate: Stopped (24)  norepinephrine, 0.02-0.06 mcg/kg/min, Last Rate: Stopped (24)  sodium chloride, 30 mL/hr, Last Rate: 30 mL/hr  (05/17/24 1257)      PRN Meds:.  acetaminophen **OR** acetaminophen **OR** acetaminophen    Calcium Replacement - Follow Nurse / BPA Driven Protocol    dextrose    dextrose    glucagon (human recombinant)    HYDROcodone-acetaminophen    Magnesium Cardiology Dose Replacement - Follow Nurse / BPA Driven Protocol    meperidine    Morphine **AND** naloxone    niCARdipine    nitroglycerin    nitroglycerin    norepinephrine    ondansetron    Phosphorus Replacement - Follow Nurse / BPA Driven Protocol    Potassium Replacement - Follow Nurse / BPA Driven Protocol    vasopressin      CAD (coronary artery disease)    Mitral valve disease         Physical Exam:    General: Alert, cooperative, no distress, appears stated age  Head:  Normocephalic, atraumatic, mucous membranes moist  Eyes:  Conjunctivae/corneas clear, EOMs intact     Neck:  Supple,  no bruit  Lungs:  Coarse and diminished  Chest wall: Tenderness at incision  Heart::  Regular rate and rhythm, S1 and S2 normal, 1/6 holosystolic murmur.  No rub or gallop  Abdomen: Soft, nontender, nondistended, bowel sounds active  Extremities: No cyanosis, clubbing.  Edema noted  Pulses: Diminished pedal pulses  Skin:  No rashes or lesions  Neuro/psych: A&O x3. CN II through XII are grossly intact with appropriate affect    Vital Signs:  Vitals:    05/18/24 0800 05/18/24 0900 05/18/24 1000 05/18/24 1125   BP: 108/51 92/53 117/61    Patient Position:       Pulse: 66 66 80    Resp:       Temp: 98.2 °F (36.8 °C) 98.8 °F (37.1 °C)  97.9 °F (36.6 °C)   TempSrc:    Oral   SpO2: 93% 94% 92%    Weight:       Height:         Wt Readings from Last 1 Encounters:   05/18/24 81.2 kg (179 lb 0.2 oz)       Intake/Output Summary (Last 24 hours) at 5/18/2024 1219  Last data filed at 5/18/2024 0800  Gross per 24 hour   Intake 4421 ml   Output 1680 ml   Net 2741 ml         Results Review:     CBC    Results from last 7 days   Lab Units 05/18/24  0307 05/17/24  1650 05/17/24  1525 05/17/24  1422  05/17/24  1324 05/17/24  1221 05/17/24  1218 05/17/24  1036 05/17/24  1029 05/17/24  0747 05/14/24  0853   WBC 10*3/mm3 9.63  --   --   --   --  10.29  --   --  8.61  --  5.31   HEMOGLOBIN g/dL 9.5*  --   --   --   --  11.1*  --   --  9.6*  --  13.3   HEMOGLOBIN, POC g/dL  --  10.2* 11.1* 11.1* 10.8*  --  11.0*   < >  --    < >  --    PLATELETS 10*3/mm3 96*  --   --   --   --  129*  --   --  137*  --  191    < > = values in this interval not displayed.     Cr Clearance Estimated Creatinine Clearance: 81.1 mL/min (by C-G formula based on SCr of 0.82 mg/dL).  Coag   Results from last 7 days   Lab Units 05/18/24  0307 05/17/24  1221 05/17/24  1029 05/14/24  0853   INR  1.03 1.06 1.40* 0.94   APTT seconds  --  25.6 25.9 27.8     HbA1C   Lab Results   Component Value Date    HGBA1C 7.00 (H) 05/14/2024     Blood Glucose   Glucose   Date/Time Value Ref Range Status   05/18/2024 1138 240 (H) 70 - 105 mg/dL Final     Comment:     Serial Number: [id number]:  [id number]   05/18/2024 0915 327 (H) 70 - 105 mg/dL Final     Comment:     Serial Number: [id number]:  [id number]   05/18/2024 0608 116 (H) 70 - 105 mg/dL Final     Comment:     Serial Number: [id number]:  [id number]   05/18/2024 0501 132 (H) 70 - 105 mg/dL Final     Comment:     Serial Number: [id number]:  [id number]   05/18/2024 0402 126 (H) 70 - 105 mg/dL Final     Comment:     Serial Number: [id number]:  [id number]   05/18/2024 0306 134 (H) 70 - 105 mg/dL Final     Comment:     Serial Number: [id number]:  [id number]   05/18/2024 0202 154 (H) 70 - 105 mg/dL Final     Comment:     Serial Number: [id number]:  [id number]   05/18/2024 0103 140 (H) 70 - 105 mg/dL Final     Comment:     Serial Number: [id number]:  [id number]     Infection     CMP   Results from last 7 days   Lab Units 05/18/24  0307 05/17/24  1904 05/17/24  1832 05/17/24  1650 05/17/24  1525 05/17/24  1422 05/17/24  1324 05/17/24  1221 05/17/24  1218  "05/14/24  0853   SODIUM mmol/L 141  --   --   --   --   --   --  145  --  139   POTASSIUM mmol/L 4.2 4.2 2.4*  --   --   --   --  3.9  --  4.4   CHLORIDE mmol/L 111*  --   --   --   --   --   --  111*  --  104   CO2 mmol/L 23.0  --   --   --   --   --   --  24.0  --  27.0   BUN mg/dL 13  --   --   --   --   --   --  11  --  14   CREATININE mg/dL 0.82  --   --  0.84 0.83 0.77 0.77 0.80 0.75 0.93   GLUCOSE mg/dL 135*  --   --   --   --   --   --  126*  --  154*   ALBUMIN g/dL 3.8  --   --   --   --   --   --  4.0  --  4.5   BILIRUBIN mg/dL  --   --   --   --   --   --   --   --   --  2.2*   ALK PHOS U/L  --   --   --   --   --   --   --   --   --  71   AST (SGOT) U/L  --   --   --   --   --   --   --   --   --  17   ALT (SGPT) U/L  --   --   --   --   --   --   --   --   --  17     ABG    Results from last 7 days   Lab Units 05/18/24  1005 05/18/24  0315 05/17/24  1650 05/17/24  1525 05/17/24  1422 05/17/24  1324 05/17/24  1229 05/17/24  1218 05/17/24  1121   PH, ARTERIAL pH units 7.351  --  7.353 7.363 7.385 7.402  --  7.421 7.350   PCO2, ARTERIAL mm Hg 40.3  --  41.2 38.7 38.2 36.4  --  38.9 42.7   PO2 ART mm Hg 66.4*  --  98.5 108.1* 98.7 96.2  --  214.2* 230.0*   O2 SATURATION ART % 91.9*  --  97.3 98.0 97.6 97.5  --  99.8* 100.0*   BASE EXCESS ART mmol/L -3.1* -3.2* -2.5* -3.1* -1.9* -1.8* -1.2* 0.9 <0.0*     UA    Results from last 7 days   Lab Units 05/14/24  0853   NITRITE UA  Negative     RAIZA  No results found for: \"POCMETH\", \"POCAMPHET\", \"POCBARBITUR\", \"POCBENZO\", \"POCCOCAINE\", \"POCOPIATES\", \"POCOXYCODO\", \"POCPHENCYC\", \"POCPROPOXY\", \"POCTHC\", \"POCTRICYC\"  Lysis Labs   Results from last 7 days   Lab Units 05/18/24  0307 05/17/24  1650 05/17/24  1525 05/17/24  1422 05/17/24  1324 05/17/24  1221 05/17/24  1218 05/17/24  1036 05/17/24  1029 05/17/24  0747 05/14/24  0853   INR  1.03  --   --   --   --  1.06  --   --  1.40*  --  0.94   APTT seconds  --   --   --   --   --  25.6  --   --  25.9  --  27.8 "   FIBRINOGEN mg/dL  --   --   --   --   --   --   --   --  194*  --   --    HEMOGLOBIN g/dL 9.5*  --   --   --   --  11.1*  --   --  9.6*  --  13.3   HEMOGLOBIN, POC g/dL  --  10.2* 11.1* 11.1* 10.8*  --  11.0*   < >  --    < >  --    PLATELETS 10*3/mm3 96*  --   --   --   --  129*  --   --  137*  --  191   CREATININE mg/dL 0.82 0.84 0.83 0.77 0.77 0.80 0.75  --   --   --  0.93    < > = values in this interval not displayed.     Radiology(recent) XR Chest 1 View    Result Date: 5/18/2024  Impression: Postsurgical chest without significant interval change, including similar small left and suspected trace right pneumothoraces. Electronically Signed: Prasad Wasserman MD  5/18/2024 10:27 AM EDT  Workstation ID: [id number]    XR Abdomen KUB    Result Date: 5/17/2024  Impression: 1. Orogastric tube in place the tip projecting over the fundus of the stomach. Proximal sideport is near the level of the gastroesophageal junction. Electronically Signed: King Charles MD  5/17/2024 12:55 PM EDT  Workstation ID: [id number]    XR Chest 1 View    Result Date: 5/17/2024  Impression: 1.Interval postoperative changes with positioning of support tubes and lines, as above. Of note, the tip of the enteric tube terminates just below the level of the diaphragm. Sideport is above the level of diaphragm. Consider further advancement into the  stomach. 2.Left pneumothorax, which may be small, but is difficult to quantify on this supine image. Possible tiny right pneumothorax. 3.Patchy bilateral airspace opacities, likely due to atelectasis. Electronically Signed: Carine Braxton  5/17/2024 12:55 PM EDT  Workstation ID: [id number]             Imaging Results (Last 24 Hours)       Procedure Component Value Units Date/Time    XR Chest 1 View [[id number]] Collected: 05/18/24 1023     Updated: 05/18/24 1029    Narrative:      XR CHEST 1 VW    Date of Exam: 5/18/2024 5:28 AM EDT    Indication: Post-Op Heart Surgery    Comparison: Chest radiograph  5/17/2024.    Findings:  Postsurgical chest. Extubation and removal of nasogastric tube. Unchanged position of remaining lines/tubes. Cardiomediastinal silhouette is unchanged. Unchanged scattered subsegmental atelectasis. No new focal consolidation. Similar small left   pneumothorax. Similar suspected trace right pneumothorax. No significant pleural effusion. Osseous structures are unchanged.      Impression:      Impression:  Postsurgical chest without significant interval change, including similar small left and suspected trace right pneumothoraces.      Electronically Signed: Prasad Wasserman MD    5/18/2024 10:27 AM EDT    Workstation ID: [id number]    XR Abdomen KUB [[id number]] Collected: 05/17/24 1252     Updated: 05/17/24 1258    Narrative:      XR ABDOMEN KUB    Date of Exam: 5/17/2024 12:29 PM EDT    Indication: OGT    Comparison: None available.    Findings:  Orogastric tube is in place the tip projecting over the fundus of the stomach. Proximal sideport is near the level of the gastroesophageal junction. There are mediastinal drains and left basilar thoracostomy tubes in place. No definite basilar   pneumothorax or pleural effusion. Bowel gas pattern is within normal limits.      Impression:      Impression:    1. Orogastric tube in place the tip projecting over the fundus of the stomach. Proximal sideport is near the level of the gastroesophageal junction.      Electronically Signed: King Charles MD    5/17/2024 12:55 PM EDT    Workstation ID: [id number]    XR Chest 1 View [[id number]] Collected: 05/17/24 1249     Updated: 05/17/24 1257    Narrative:      XR CHEST 1 VW    Date of Exam: 5/17/2024 12:24 PM EDT    Indication: Post-Op Check Line & Tube Placement    Comparison: Two-view chest x-ray 5/14/2024.    Findings:  There've been interval postoperative changes from median sternotomy, CABG, and valve replacement. Tip of the endotracheal tube terminates in the midthoracic trachea approximately 5 cm above the  arturo. Tip of the Oxford-Larry catheter terminates in the   pulmonary outflow tract. Tip of the enteric tube terminates just below the level of the diaphragm. Sideport is located above the level of diaphragm. There are chest tubes in place. There is a deep sulcus sign on the left on the supine image, as well as   pleural line visible at the left upper chest with degree of pleural separation measuring 5 mm. The right upper chest is obscured by a defibrillator pad, but there is a possible right apical pleural line with degree of pleural separation potentially   measuring 2 mm.    There are bilateral perihilar and left basilar airspace opacities. Heart size is not enlarged.      Impression:      Impression:  1.Interval postoperative changes with positioning of support tubes and lines, as above. Of note, the tip of the enteric tube terminates just below the level of the diaphragm. Sideport is above the level of diaphragm. Consider further advancement into the   stomach.  2.Left pneumothorax, which may be small, but is difficult to quantify on this supine image. Possible tiny right pneumothorax.  3.Patchy bilateral airspace opacities, likely due to atelectasis.      Electronically Signed: Carine Braxton    5/17/2024 12:55 PM EDT    Workstation ID: [id number]            Cardiac Studies:  Echo- Results for orders placed in visit on 05/17/24    Intra-Op Anesthesia LEO    Narrative  Intra-Op Anesthesia LEO    Procedure Performed: Intra-Op Anesthesia LEO  Start Time:  End Time:    Preanesthesia Checklist:  Patient identified, IV assessed, risks and benefits discussed, monitors and equipment assessed, procedure being performed at surgeon's request and anesthesia consent obtained.    General Procedure Information  Diagnostic Indications for Echo:  assessment of surgical repair and hemodynamic monitoring  Location performed:  OR  Intubated  Bite block placed  Heart visualized  Probe Insertion:  Easy  Probe Type:  Biplane and  multiplane  Modalities:  Color flow mapping, pulse wave Doppler and continuous wave Doppler    Echocardiographic and Doppler Measurements    Ventricles    Right Ventricle:  Cavity size normal.  Hypertrophy not present.  Thrombus not present.  Global function normal.  Ejection Fraction 50%.  Left Ventricle:  Cavity size normal.  Thrombus not present.  Global Function normal.  Ejection Fraction 60%.    Ventricular Regional Function:  1- Basal Anteroseptal:  normal  2- Basal Anterior:  normal  3- Basal Anterolateral:  normal  4- Basal Inferolateral:  normal  5- Basal Inferior:  normal  6- Basal Inferoseptal:  normal  7- Mid Anteroseptal:  normal  8- Mid Anterior:  normal  9- Mid Anterolateral:  normal  10- Mid Inferolateral:  normal  11- Mid Inferior:  normal  12- Mid Inferoseptal:  normal  13- Apical Anterior:  normal  14- Apical Lateral:  normal  15- Apical Inferior:  normal  16- Apical Septal:  normal  17- [place]:  normal      Valves    Aortic Valve:  Annulus normal.  Stenosis not present.  Regurgitation absent.  Leaflets normal.  Leaflet motions normal.    Mitral Valve:  Annulus normal.  Stenosis not present.  Regurgitation severe.  Leaflets normal.  Leaflet motions prolapsed.  Specific leaflet segments with abnormal motions are described in the following comments:  P2    Tricuspid Valve:  Annulus normal.  Stenosis not present.  Regurgitation absent.  Leaflets normal.  Leaflet motions normal.  Pulmonic Valve:  Annulus normal.  Stenosis not present.  Regurgitation absent.      Aorta    Ascending Aorta:  Size normal.  Dissection not present.  Plaque thickness less than 3 mm.  Mobile plaque not present.  Aortic Arch:  Size normal.  Dissection not present.  Plaque thickness less than 3 mm.  Mobile plaque not present.  Descending Aorta:  Size normal.  Dissection not present.  Plaque thickness less than 3 mm.  Mobile plaque not present.      Atria    Right Atrium:  Size normal.  Left Atrium:  Size normal.  Spontaneous echo  contrast not present.  Thrombus not present.  Tumor not present.  Device not present.  Left atrial appendage normal.      Septa        Ventricular Septum:  Intra-ventricular septum morphology normal.        Other Findings  Pericardium:  normal  Pleural Effusion:  none  Pulmonary Arteries:  normal      Anesthesia Information  Performed Personally  Anesthesiologist:  Wilber Del Valle MD      Echocardiogram Comments:  LEO placed easily no resistance, lubricated, bite guard      Pre cpb LEO  LV no wma EF 60  RV low nl SF  MV P2>P3 prolapse with ant and mainly posteriorly directed jet , mod sev MR +coanda  AV TV wnl      S/p cabg x 1 MV ring  LV no wma EF 60+  RV nl SF  TV mild mod TR  AV wnl  MV ring/ repair no MR    Stress Myoview-  Cath-        Shelton Saenz, DO  05/18/24  12:19 EDT    Copied information has been reviewed and is current as of 05/18/24

## 2024-05-18 NOTE — THERAPY EVALUATION
Patient Name: Maurice Alvarado  : 1940    MRN: [id number]                              Today's Date: 2024       Admit Date: 2024    Visit Dx:     ICD-10-CM ICD-9-CM   1. Mitral valve disease  I05.9 394.9   2. CAD (coronary artery disease)  I25.10 414.00     Patient Active Problem List   Diagnosis    Hypertension    Mitral valve disease    Personal history of other diseases of the circulatory system    Type 2 diabetes mellitus    Essential hypertension    Pure hypercholesterolemia    CAD (coronary artery disease)     Past Medical History:   Diagnosis Date    CAD (coronary artery disease) 2024    Diabetes mellitus     Heart murmur     Heart valve disease     Hypertension      Past Surgical History:   Procedure Laterality Date    CARDIAC CATHETERIZATION N/A 2024    Procedure: Left Heart Cath with angiogram;  Surgeon: Lenny Gilman MD;  Location:  [facility];  Service: Cardiovascular;  Laterality: N/A;    CARDIAC CATHETERIZATION N/A 2024    Procedure: Left ventriculography;  Surgeon: Lenny Gilman MD;  Location:  [facility];  Service: Cardiovascular;  Laterality: N/A;    GALLBLADDER SURGERY        General Information       Row Name 24 1419          General Information    Prior Level of Function independent:  very active & independent  -     Existing Precautions/Restrictions fall;cardiac;sternal  -     Barriers to Rehab none identified  -       Row Name 24 1419          Living Environment    People in Home spouse  -       Row Name 24 1419          Home Main Entrance    Number of Stairs, Main Entrance twelve;other (see comments)  though pt can avoid steps going in if needed  -       Row Name 24 1419          Stairs Within Home, Primary    Number of Stairs, Within Home, Primary none  -       Row Name 24 1419          Cognition    Orientation Status (Cognition) oriented x 4  -       Row Name 24 1419           Safety Issues, Functional Mobility    Impairments Affecting Function (Mobility) endurance/activity tolerance;range of motion (ROM)  -               User Key  (r) = Recorded By, (t) = Taken By, (c) = Cosigned By      Initials Name Provider Type     Lauren Briscoe OT Occupational Therapist                     Mobility/ADL's       Row Name 05/18/24 1420          Transfers    Transfers sit-stand transfer;stand-sit transfer  -[facility] Name 05/18/24 1420          Sit-Stand Transfer    Sit-Stand [place] (Transfers) minimum assist (75% patient effort);verbal cues;nonverbal cues (demo/gesture)  -     Assistive Device (Sit-Stand Transfers) walker, front-wheeled  -[facility] Name 05/18/24 1420          Stand-Sit Transfer    Stand-Sit [place] (Transfers) contact guard;verbal cues;nonverbal cues (demo/gesture)  -     Assistive Device (Stand-Sit Transfers) walker, front-wheeled  -MH       Row Name 05/18/24 1420          Functional Mobility    Functional Mobility- Ind. Level minimum assist (75% patient effort);1 person + 1 person to manage equipment  -     Functional Mobility- Device walker, front-wheeled  -     Functional Mobility-Distance (Feet) 75  -[facility] Name 05/18/24 1420          Activities of Daily Living    BADL Assessment/Intervention feeding;toileting;grooming  -MH       Row Name 05/18/24 1420          Self-Feeding Assessment/Training    [place] Level (Feeding) feeding skills;set up  -MH       Row Name 05/18/24 1420          Toileting Assessment/Training    [place] Level (Toileting) toileting skills;dependent (less than 25% patient effort)  -     Comment, (Toileting) beltre,  miralax  -MH       Row Name 05/18/24 1420          Grooming Assessment/Training    [place] Level (Grooming) grooming skills;set up  -               User Key  (r) = Recorded By, (t) = Taken By, (c) = Cosigned By      Initials Name Provider Type    Lauren Mo OT Occupational Therapist                    Obj/Interventions       [facility] Name 05/18/24 1421          Sensory Assessment (Somatosensory)    Sensory Assessment (Somatosensory) sensation intact  -[facility] Name 05/18/24 1421          Vision Assessment/Intervention    Visual Impairment/Limitations WFL  -MH       Row Name 05/18/24 1421          Range of Motion Comprehensive    General Range of Motion no range of motion deficits identified  -     Comment, General Range of Motion shld flex wylie to 90* with sternal prec.  -[facility] Name 05/18/24 1421          Strength Comprehensive (MMT)    General Manual Muscle Testing (MMT) Assessment no strength deficits identified  -     Comment, General Manual Muscle Testing (MMT) Assessment 10 lb lifting restriction  -               User Key  (r) = Recorded By, (t) = Taken By, (c) = Cosigned By      Initials Name Provider Type     Lauren Briscoe OT Occupational Therapist                   Goals/Plan       Row Name 05/18/24 1426          Bed Mobility Goal 1 (OT)    Activity/Assistive Device (Bed Mobility Goal 1, OT) bed mobility activities, all  -     [place] Level/Cues Needed (Bed Mobility Goal 1, OT) minimum assist (75% or more patient effort)  -     Time Frame (Bed Mobility Goal 1, OT) 1 week  -     Strategies/Barriers (Bed Mobility Goal 1, OT) spouse coached to assist  -MH       Row Name 05/18/24 1426          Bathing Goal 1 (OT)    Activity/Device (Bathing Goal 1, OT) bathing skills, all  -MH     [place] Level/Cues Needed (Bathing Goal 1, OT) minimum assist (75% or more patient effort)  -     Time Frame (Bathing Goal 1, OT) 1 week  -MH       Row Name 05/18/24 1426          Dressing Goal 1 (OT)    Activity/Device (Dressing Goal 1, OT) dressing skills, all  -MH     [place]/Cues Needed (Dressing Goal 1, OT) moderate assist (50-74% patient effort)  -     Time Frame (Dressing Goal 1, OT) 1 week  -MH       Row Name 05/18/24 1426          Toileting Goal 1 (OT)     Activity/Device (Toileting Goal 1, OT) toileting skills, all  -     [place] Level/Cues Needed (Toileting Goal 1, OT) modified independence  -     Time Frame (Toileting Goal 1, OT) 1 week  -       Row Name 05/18/24 1426          Therapy Assessment/Plan (OT)    Planned Therapy Interventions (OT) activity tolerance training;adaptive equipment training;BADL retraining;edema control/reduction;functional balance retraining;occupation/activity based interventions;patient/caregiver education/training;ROM/therapeutic exercise  -               User Key  (r) = Recorded By, (t) = Taken By, (c) = Cosigned By      Initials Name Provider Type     Lauren Briscoe, OT Occupational Therapist                   Clinical Impression       Row Name 05/18/24 1422          Pain Assessment    Pretreatment Pain Rating 0/10 - no pain  -     Posttreatment Pain Rating 0/10 - no pain  -     Pain Location - Side/Orientation other (see comments)  with deep breath reports 4/10 pain momentarily  -       Row Name 05/18/24 1422          Plan of Care Review    Plan of Care Reviewed With patient;spouse  -     Progress improving  -     Outcome Evaluation Pt is 82 y/o M admitted for CABG, MVR 2* valve and coronary artery disease. Pt is POD 1 and recovering well so far. He is very eager to advance his activity tolerance and will do well at home with spouse at d/c. Ot will follow to teach HEP and adaptive ADL & mobility skills.  -       Row Name 05/18/24 1422          Therapy Assessment/Plan (OT)    Rehab Potential (OT) good, to achieve stated therapy goals  -     Criteria for Skilled Therapeutic Interventions Met (OT) skilled treatment is necessary  -     Therapy Frequency (OT) 5 times/wk  -     Predicted Duration of Therapy Intervention (OT) until d/c  -       Row Name 05/18/24 1422          Therapy Plan Review/Discharge Plan (OT)    Anticipated Discharge Disposition (OT) home with assist  -       Row Name 05/18/24 1422           Vital Signs    Pre SpO2 (%) 98  -MH     O2 Delivery Pre Treatment supplemental O2  -MH     Intra SpO2 (%) 92  -MH     O2 Delivery Intra Treatment room air  -MH     Post SpO2 (%) 94  -MH     O2 Delivery Post Treatment room air  -MH     Pre Patient Position Sitting  -MH     Intra Patient Position Standing  -MH     Post Patient Position Sitting  -MH       Row Name 05/18/24 1422          Positioning and Restraints    Pre-Treatment Position sitting in chair/recliner  -MH     Post Treatment Position chair  -MH     In Chair notified nsg;call light within reach;encouraged to call for assist;with family/caregiver;legs elevated  -               User Key  (r) = Recorded By, (t) = Taken By, (c) = Cosigned By      Initials Name Provider Type    Lauren Mo OT Occupational Therapist                   Outcome Measures    No documentation.                   Occupational Therapy Education       Title: PT OT SLP Therapies (Done)       Topic: Occupational Therapy (Done)       Point: ADL training (Done)       Description:   Instruct learner(s) on proper safety adaptation and remediation techniques during self care or transfers.   Instruct in proper use of assistive devices.                  Learning Progress Summary             Patient Eager, E,TB,D, VU,DU,NR by  at 5/18/2024 1427   Family Eager, E,TB,D, VU,DU,NR by  at 5/18/2024 1427   Significant Other Eager, E,TB,D, VU,DU,NR by  at 5/18/2024 1427                         Point: Home exercise program (Done)       Description:   Instruct learner(s) on appropriate technique for monitoring, assisting and/or progressing therapeutic exercises/activities.                  Learning Progress Summary             Patient Eager, E,TB,D, VU,DU,NR by  at 5/18/2024 1427   Family Eager, E,TB,D, VU,DU,NR by  at 5/18/2024 1427   Significant Other Eager, E,TB,D, VU,DU,NR by  at 5/18/2024 1427                         Point: Precautions (Done)       Description:   Instruct  learner(s) on prescribed precautions during self-care and functional transfers.                  Learning Progress Summary             Patient Eager, E,TB,D, VU,DU,NR by  at 5/18/2024 1427   Family Eager, E,TB,D, VU,DU,NR by  at 5/18/2024 1427   Significant Other Eager, E,TB,D, VU,DU,NR by  at 5/18/2024 1427                         Point: Body mechanics (Done)       Description:   Instruct learner(s) on proper positioning and spine alignment during self-care, functional mobility activities and/or exercises.                  Learning Progress Summary             Patient Eager, E,TB,D, VU,DU,NR by  at 5/18/2024 1427   Family Eager, E,TB,D, VU,DU,NR by  at 5/18/2024 1427   Significant Other Eager, E,TB,D, VU,DU,NR by  at 5/18/2024 1427                                         User Key       Initials Effective Dates Name Provider Type Discipline     06/16/21 -  Lauren Briscoe OT Occupational Therapist OT                  OT Recommendation and Plan  Planned Therapy Interventions (OT): activity tolerance training, adaptive equipment training, BADL retraining, edema control/reduction, functional balance retraining, occupation/activity based interventions, patient/caregiver education/training, ROM/therapeutic exercise  Therapy Frequency (OT): 5 times/wk  Plan of Care Review  Plan of Care Reviewed With: patient, spouse  Progress: improving  Outcome Evaluation: Pt is 84 y/o M admitted for CABG, MVR 2* valve and coronary artery disease. Pt is POD 1 and recovering well so far. He is very eager to advance his activity tolerance and will do well at home with spouse at d/c. Ot will follow to teach HEP and adaptive ADL & mobility skills.     Time Calculation:         Time Calculation- OT       Row Name 05/18/24 1428             Time Calculation-     OT Start Time 1030  -      OT Stop Time 1059  -      OT Time Calculation (min) 29 min  -      Total Timed Code Minutes- OT 0 minute(s)  -      OT Received On  05/18/24  -      OT - Next Appointment 05/20/24  -      OT Goal Re-Cert Due Date 06/01/24  -                User Key  (r) = Recorded By, (t) = Taken By, (c) = Cosigned By      Initials Name Provider Type    Lauren Mo OT Occupational Therapist                  Therapy Charges for Today       Code Description Service Date Service Provider Modifiers Qty    [id number] HC OT EVAL HIGH COMPLEXITY 3 5/18/2024 Lauren Briscoe OT GO 1                 Lauren Briscoe OT  5/18/2024

## 2024-05-18 NOTE — THERAPY EVALUATION
Patient Name: Maurice Alvarado  : 1940    MRN: [id number]                              Today's Date: 2024       Admit Date: 2024    Visit Dx:     ICD-10-CM ICD-9-CM   1. Mitral valve disease  I05.9 394.9   2. CAD (coronary artery disease)  I25.10 414.00     Patient Active Problem List   Diagnosis    Hypertension    Mitral valve disease    Personal history of other diseases of the circulatory system    Type 2 diabetes mellitus    Essential hypertension    Pure hypercholesterolemia    CAD (coronary artery disease)     Past Medical History:   Diagnosis Date    CAD (coronary artery disease) 2024    Diabetes mellitus     Heart murmur     Heart valve disease     Hypertension      Past Surgical History:   Procedure Laterality Date    CARDIAC CATHETERIZATION N/A 2024    Procedure: Left Heart Cath with angiogram;  Surgeon: Lenny Gilman MD;  Location: [facility] CATH INVASIVE LOCATION;  Service: Cardiovascular;  Laterality: N/A;    CARDIAC CATHETERIZATION N/A 2024    Procedure: Left ventriculography;  Surgeon: Lenny Gilman MD;  Location: [facility] CATH INVASIVE LOCATION;  Service: Cardiovascular;  Laterality: N/A;    GALLBLADDER SURGERY        General Information       Row Name 24 1845          Physical Therapy Time and Intention    Document Type evaluation  -EL     Mode of Treatment individual therapy;physical therapy  -       Row Name 24 184          General Information    Prior Level of Function independent:  -EL     Existing Precautions/Restrictions fall;cardiac;sternal  -EL     Barriers to Rehab none identified  -       Row Name 24 184          Living Environment    People in Home spouse  -       Row Name 24 1845          Home Main Entrance    Number of Stairs, Main Entrance twelve;other (see comments)  from walkout basement  -       Row Name 24 1845          Stairs Within Home, Primary    Number of Stairs, Within Home, Primary none  -       Row  Name 05/18/24 1845          Cognition    Orientation Status (Cognition) oriented x 4  -EL       Row Name 05/18/24 1845          Safety Issues, Functional Mobility    Impairments Affecting Function (Mobility) endurance/activity tolerance;range of motion (ROM)  -EL               User Key  (r) = Recorded By, (t) = Taken By, (c) = Cosigned By      Initials Name Provider Type    Jt Wilson, PT Physical Therapist                   Mobility       Row Name 05/18/24 1846          Bed Mobility    Comment, (Bed Mobility) in chair when PT entered  -EL       Row Name 05/18/24 1846          Sit-Stand Transfer    Sit-Stand [place] (Transfers) minimum assist (75% patient effort);verbal cues;nonverbal cues (demo/gesture)  2 attempts to come to standing  -EL     Assistive Device (Sit-Stand Transfers) walker, front-wheeled  -EL       Row Name 05/18/24 1846          Gait/Stairs (Locomotion)    [place] Level (Gait) contact guard  -EL     Assistive Device (Gait) walker, front-wheeled  -EL     Distance in Feet (Gait) 150  -EL     Deviations/Abnormal Patterns (Gait) gait speed decreased  -EL     Comment, (Gait/Stairs) No significant LOB or SOA  -EL               User Key  (r) = Recorded By, (t) = Taken By, (c) = Cosigned By      Initials Name Provider Type    Jt Wilson PT Physical Therapist                   Obj/Interventions       Row Name 05/18/24 1850          Range of Motion Comprehensive    General Range of Motion bilateral lower extremity ROM WFL  -EL       Row Name 05/18/24 1850          Strength Comprehensive (MMT)    General Manual Muscle Testing (MMT) Assessment lower extremity strength deficits identified  -EL       Row Name 05/18/24 1850          Balance    Balance Assessment sitting static balance;standing static balance;standing dynamic balance  -EL     Static Sitting Balance independent  -EL     Static Standing Balance contact guard  -EL     Dynamic Standing Balance contact guard  -EL     Position/Device  Used, Standing Balance walker, front-wheeled  -EL       Row Name 05/18/24 1850          Sensory Assessment (Somatosensory)    Sensory Assessment (Somatosensory) sensation intact  -EL               User Key  (r) = Recorded By, (t) = Taken By, (c) = Cosigned By      Initials Name Provider Type    Jt Wilson, PT Physical Therapist                   Goals/Plan       Row Name 05/18/24 1904          Bed Mobility Goal 1 (PT)    Activity/Assistive Device (Bed Mobility Goal 1, PT) bed mobility activities, all  -EL     [place] Level/Cues Needed (Bed Mobility Goal 1, PT) modified independence  -EL     Time Frame (Bed Mobility Goal 1, PT) long term goal (LTG);2 weeks  -EL       Row Name 05/18/24 1904          Transfer Goal 1 (PT)    Activity/Assistive Device (Transfer Goal 1, PT) transfers, all;walker, rolling  -EL     [place] Level/Cues Needed (Transfer Goal 1, PT) modified independence  -EL     Time Frame (Transfer Goal 1, PT) long term goal (LTG);2 weeks  -       Row Name 05/18/24 1904          Gait Training Goal 1 (PT)    Activity/Assistive Device (Gait Training Goal 1, PT) gait (walking locomotion);walker, rolling  -EL     [place] Level (Gait Training Goal 1, PT) modified independence  -EL     Distance (Gait Training Goal 1, PT) 400  -EL     Time Frame (Gait Training Goal 1, PT) long term goal (LTG);2 weeks  -       Row Name 05/18/24 1904          Stairs Goal 1 (PT)    Activity/Assistive Device (Stairs Goal 1, PT) stairs, all skills  -EL     [place] Level/Cues Needed (Stairs Goal 1, PT) modified independence  -EL     Number of Stairs (Stairs Goal 1, PT) 12  -EL     Time Frame (Stairs Goal 1, PT) long term goal (LTG);2 weeks  -       Row Name 05/18/24 1904          Therapy Assessment/Plan (PT)    Planned Therapy Interventions (PT) neuromuscular re-education;transfer training;bed mobility training;gait training;patient/family education;strengthening  -EL               User Key  (r) = Recorded By,  (t) = Taken By, (c) = Cosigned By      Initials Name Provider Type    Jt Wilson, PT Physical Therapist                   Clinical Impression       Row Name 05/18/24 1852          Pain    Pretreatment Pain Rating 0/10 - no pain  -EL     Posttreatment Pain Rating 0/10 - no pain  -EL       Row Name 05/18/24 1852          Plan of Care Review    Plan of Care Reviewed With patient;spouse  -EL     Outcome Evaluation Pt is an 82 YO M POD 1 CABG, MVR. Pt reports living at home with spouse, typically is indpendent with all ADLs, ambulation without AD and no recent falls. This date pt demonstrates excellent mobility, requiring MIN A initially to come to standing, but then ambulating wiht RWx safely for appox 150 feet. Pt likely to progress very well and anticipate safe return home with spouse. PT to continue to follow and assess ability to perform bed mobiltiy and stair navigation. Recommendation is return home following d/c.  -       Row Name 05/18/24 1852          Therapy Assessment/Plan (PT)    Rehab Potential (PT) good, to achieve stated therapy goals  -EL     Criteria for Skilled Interventions Met (PT) yes  -EL     Therapy Frequency (PT) 3 times/wk  -       Row Name 05/18/24 1852          Vital Signs    Pre SpO2 (%) 98  -EL     O2 Delivery Pre Treatment supplemental O2  -EL     Intra SpO2 (%) 94  -EL     O2 Delivery Intra Treatment room air  -EL     Post SpO2 (%) 94  -EL     O2 Delivery Post Treatment room air  -EL     Pre Patient Position Sitting  -EL     Intra Patient Position Standing  -EL     Post Patient Position Sitting  -EL       Row Name 05/18/24 1852          Positioning and Restraints    Pre-Treatment Position sitting in chair/recliner  -EL     Post Treatment Position chair  -EL     In Chair notified nsg;reclined;call light within reach;encouraged to call for assist  -EL               User Key  (r) = Recorded By, (t) = Taken By, (c) = Cosigned By      Initials Name Provider Type    Jt Wilson, PT  Physical Therapist                   Outcome Measures       Row Name 05/18/24 1905          How much help from another person do you currently need...    Turning from your back to your side while in flat bed without using bedrails? 2  -EL     Moving from lying on back to sitting on the side of a flat bed without bedrails? 2  -EL     Moving to and from a bed to a chair (including a wheelchair)? 3  -EL     Standing up from a chair using your arms (e.g., wheelchair, bedside chair)? 3  -EL     Climbing 3-5 steps with a railing? 3  -EL     To walk in hospital room? 3  -EL     AM-PAC 6 Clicks Score (PT) 16  -EL     Highest Level of Mobility Goal 5 --> Static standing  -EL       Row Name 05/18/24 1905          Functional Assessment    Outcome Measure Options AM-PAC 6 Clicks Basic Mobility (PT)  -EL               User Key  (r) = Recorded By, (t) = Taken By, (c) = Cosigned By      Initials Name Provider Type    Jt Wilson PT Physical Therapist                                 Physical Therapy Education       Title: PT OT SLP Therapies (Done)       Topic: Physical Therapy (Done)       Point: Mobility training (Done)       Learning Progress Summary             Patient Acceptance, E,TB, VU by  at 5/18/2024 1905                         Point: Precautions (Done)       Learning Progress Summary             Patient Acceptance, E,TB, VU by  at 5/18/2024 1905                                         User Key       Initials Effective Dates Name Provider Type [facility] 06/23/20 -  Jt Lema, PT Physical Therapist PT                  PT Recommendation and Plan  Planned Therapy Interventions (PT): neuromuscular re-education, transfer training, bed mobility training, gait training, patient/family education, strengthening  Plan of Care Reviewed With: patient, spouse  Outcome Evaluation: Pt is an 84 YO M POD 1 CABG, MVR. Pt reports living at home with spouse, typically is indpendent with all ADLs, ambulation without AD and no  recent falls. This date pt demonstrates excellent mobility, requiring MIN A initially to come to standing, but then ambulating wiht RWx safely for appox 150 feet. Pt likely to progress very well and anticipate safe return home with spouse. PT to continue to follow and assess ability to perform bed mobiltiy and stair navigation. Recommendation is return home following d/c.     Time Calculation:   PT Evaluation Complexity  History, PT Evaluation Complexity: 3 or more personal factors and/or comorbidities  Examination of Body Systems (PT Eval Complexity): total of 4 or more elements  Clinical Presentation (PT Evaluation Complexity): unstable  Clinical Decision Making (PT Evaluation Complexity): high complexity  Overall Complexity (PT Evaluation Complexity): high complexity     PT Charges       Row Name 05/18/24 1906             Time Calculation    Start Time 1030  -EL      Stop Time 1057  -EL      Time Calculation (min) 27 min  -EL      PT Received On 05/18/24  -EL      PT - Next Appointment 05/20/24  -EL      PT Goal Re-Cert Due Date 06/01/24  -EL                User Key  (r) = Recorded By, (t) = Taken By, (c) = Cosigned By      Initials Name Provider Type    Jt Wilson PT Physical Therapist                  Therapy Charges for Today       Code Description Service Date Service Provider Modifiers Qty    [id number]  PT EVAL HIGH COMPLEXITY 4 5/18/2024 Jt Lema, PT GP 1            PT G-Codes  Outcome Measure Options: AM-PAC 6 Clicks Basic Mobility (PT)  AM-PAC 6 Clicks Score (PT): 16  PT Discharge Summary  Anticipated Discharge Disposition (PT): home with assist    Jt Lema PT  5/18/2024

## 2024-05-18 NOTE — PLAN OF CARE
Goal Outcome Evaluation:  Plan of Care Reviewed With: patient, spouse           Outcome Evaluation: Pt is an 82 YO M POD 1 CABG, MVR. Pt reports living at home with spouse, typically is indpendent with all ADLs, ambulation without AD and no recent falls. This date pt demonstrates excellent mobility, requiring MIN A initially to come to standing, but then ambulating wiht RWx safely for appox 150 feet. Pt likely to progress very well and anticipate safe return home with spouse. PT to continue to follow and assess ability to perform bed mobiltiy and stair navigation. Recommendation is return home following d/c.      Anticipated Discharge Disposition (PT): home with assist

## 2024-05-18 NOTE — PROGRESS NOTES
" LOS: 1 day   Patient Care Team:  Alexandria Tubbs MD as PCP - General  Lenny Gilman MD as Consulting Physician (Cardiology)    Chief Complaint: Post-op     Subjective     Subjective:  Symptoms:  Stable.  No shortness of breath or chest pain.    Diet:  Adequate intake.  No nausea or vomiting.    Activity level: Impaired due to weakness.    Pain:  He complains of pain that is mild.  Pain is well controlled.      Objective     Vital Signs  Temp:  [96.1 °F (35.6 °C)-98.2 °F (36.8 °C)] 98.2 °F (36.8 °C)  Heart Rate:  [71-89] 80  Resp:  [14-17] 17  BP: ()/(55-67) 111/60  FiO2 (%):  [40 %-70 %] 40 %  Body mass index is 23.62 kg/m².    Intake/Output Summary (Last 24 hours) at 5/18/2024 0710  Last data filed at 5/18/2024 0600  Gross per 24 hour   Intake 6547 ml   Output 6030 ml   Net 517 ml     No intake/output data recorded.    Chest tube drainage last 8 hours:   Left pleural -- 70ml   Mediastinal chest tubes (1&2) -- 100ml    Wt Readings from Last 3 Encounters:   05/18/24 81.2 kg (179 lb 0.2 oz)   05/14/24 80.3 kg (177 lb)   05/08/24 79.9 kg (176 lb 2.4 oz)       Flowsheet Rows      Flowsheet Row First Filed Value   Admission Height 185.4 cm (73\") Documented at 05/17/2024 0600   Admission Weight 76.5 kg (168 lb 10.4 oz) Documented at 05/17/2024 0600            Objective:  General Appearance:  Comfortable, well-appearing and in no acute distress.    Vital signs: (most recent): Blood pressure 108/51, pulse 66, temperature 98.2 °F (36.8 °C), resp. rate 17, height 185.4 cm (73\"), weight 81.2 kg (179 lb 0.2 oz), SpO2 93%.  Vital signs are normal.  No fever.    Output: Producing urine and no stool output.    Lungs:  Normal effort and normal respiratory rate.  There are decreased breath sounds.    Heart: Normal rate.  Regular rhythm.  S1 normal and S2 normal.    Abdomen: Abdomen is soft.  Hypoactive bowel sounds.     Extremities: Normal range of motion.  There is no dependent edema.    Neurological: Patient is alert " and oriented to person, place and time.    Skin:  Warm and dry.  (Sternal dressing in place )              Results Review:        Results from last 7 days   Lab Units 05/18/24  0307 05/17/24  1650 05/17/24  1525 05/17/24  1324 05/17/24  1221 05/17/24  1036 05/17/24  1029   WBC 10*3/mm3 9.63  --   --   --  10.29  --  8.61   HEMOGLOBIN g/dL 9.5*  --   --   --  11.1*  --  9.6*   HEMOGLOBIN, POC g/dL  --  10.2* 11.1*   < >  --    < >  --    HEMATOCRIT % 28.9*  --   --   --  33.2*  --  29.1*   HEMATOCRIT POC %  --  30* 33*   < >  --    < >  --    PLATELETS 10*3/mm3 96*  --   --   --  129*  --  137*    < > = values in this interval not displayed.         PT/INR:    Protime   Date Value Ref Range Status   05/18/2024 11.2 9.6 - 11.7 Seconds Final   05/17/2024 11.5 9.6 - 11.7 Seconds Final   05/17/2024 14.9 (H) 9.6 - 11.7 Seconds Final   /  INR   Date Value Ref Range Status   05/18/2024 1.03 0.93 - 1.10 Final   05/17/2024 1.06 0.93 - 1.10 Final   05/17/2024 1.40 (H) 0.93 - 1.10 Final       Results from last 7 days   Lab Units 05/18/24  0307 05/17/24  1904 05/17/24  1832 05/17/24  1650 05/17/24  1525 05/17/24  1324 05/17/24  1221 05/17/24  1218 05/14/24  0853   SODIUM mmol/L 141  --   --   --   --   --  145  --  139   POTASSIUM mmol/L 4.2 4.2 2.4*  --   --   --  3.9  --  4.4   CHLORIDE mmol/L 111*  --   --   --   --   --  111*  --  104   CO2 mmol/L 23.0  --   --   --   --   --  24.0  --  27.0   BUN mg/dL 13  --   --   --   --   --  11  --  14   CREATININE mg/dL 0.82  --   --  0.84 0.83   < > 0.80   < > 0.93   GLUCOSE mg/dL 135*  --   --   --   --   --  126*  --  154*   CALCIUM mg/dL 8.5*  --   --   --   --   --  8.4*  --  9.6    < > = values in this interval not displayed.         Scheduled Meds:  aspirin, 81 mg, Oral, Daily  atorvastatin, 40 mg, Oral, Nightly  ceFAZolin, 2 g, Intravenous, Q8H  chlorhexidine, 15 mL, Mouth/Throat, Q12H  enoxaparin, 40 mg, Subcutaneous, Q24H  magnesium sulfate, 1 g, Intravenous, Q8H  mupirocin,  , Each Nare, BID  pantoprazole, 40 mg, Oral, Q AM  polyethylene glycol, 17 g, Oral, BID  senna-docusate sodium, 2 tablet, Oral, BID        Infusions:  dexmedetomidine, 0.2-1.5 mcg/kg/hr, Last Rate: Stopped (05/17/24 1607)  insulin, 0-100 Units/hr, Last Rate: 0.1 Units/hr (05/18/24 0608)  niCARdipine, 5-15 mg/hr, Last Rate: Stopped (05/17/24 1333)  nitroglycerin, 5-50 mcg/min, Last Rate: Stopped (05/17/24 1333)  norepinephrine, 0.02-0.06 mcg/kg/min, Last Rate: Stopped (05/17/24 7013)  sodium chloride, 30 mL/hr, Last Rate: 30 mL/hr (05/17/24 1257)          Assessment & Plan         CAD (coronary artery disease)    Mitral valve disease      Assessment & Plan    Severe mitral regurgitation -- s/p Mvr, NÉSTOR ligation, lima to LAD with Dr. Grier 5/17/24  Single vessel CAD  Normal LV systolic function---EF 56-60%  HTN  HLD  DM type 2    POD1:     Patient looks great this morning, sitting in bedside chair and ate some oatmeal   Patient just on insulin gtt, will soon transition to SSI   Discontinue swan, get another gas and remove art line if ok  Keep central line, chest tubes, and beltre another day   Pacing for higher pressure, BP was a little soft  Give some calcium gluconate, check vit D level  Patient on 2LNC and saturation great, will see if we can wean oxygen today, encourage IS   Reassess need for diuresis this afternoon  Patient overall looking great this morning, mobilize and continue routine care     Joseph Noe PA-C  05/18/24  07:10 EDT

## 2024-05-18 NOTE — OP NOTE
Operative Note    Date of Dictation: 05/18/24    Date of Procedure: 05/17/2024    Referring Physician: Lenny Gilman MD    Indications: Severe mitral regurgitation and dyspnea    Preoperative diagnosis:  1.  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  Dyspnea of exertion  3.  Single-vessel coronary artery disease with LAD stenosis    Postoperative diagnosis:  Same    Procedure:  1.  Complex mitral valve repair with a 36 mm Medtronic flexible band posterior annuloplasty and chordal repair (4 Hackberry-Reyes x 2) of the P2 segment and cleft to plasty of P2-P3.  (CPT code 85200)  2.  CABG x 1 with a LIMA to the LAD (CPT code 89032)       Surgeon: Sean Grier MD     Assistants: Assistant: Kymberly Quintanilla PA was responsible for performing the following activities: Retraction, Suction, Irrigation, Suturing, Closing, Placing Dressing, Bypass Grafting, and all aspects of the complex cardiac case  and their skilled assistance was necessary for the success of this case.    Anesthesia: General endotracheal anesthesia and LEO    Findings:  The LIMA was harvested a pedicle had diameter 2 mm was of excellent quality.  The mid LAD was deeply intramyocardial had diameter 1.8 mm or more and it was good for bypass.  He required extensive dissections to be able to identify the vessel    Blood loss: Approximately 400 cc, most of it recovered with a Cell Saver device and cardiotomy suckers and was retransfuse to the patient    STS data: I discussed with the patient and family the risks (STS calculated), benefits and terms of surgery and he wished to proceed.  The antibiotics and the beta-blockers were given within the STS required window.  Counseling was given about diet, alcohol and tobacco use as needed    Description of the procedure:     The patient was placed supine on the operative table. Anesthesia was given and lines placed. The patient was prepped and draped using the usual sterile technique. A median  sternotomy was performed with a scalpel and the layers carried down to the sternum using the electrocautery. The sternum was splited in the midline using a vertical oscillating saw. Hemostasis was achieved. The LIMA was harvested as a pedicle and prepared with papaverine. It was of good quality. 300 units/kg of IV heparin was given to an ACT over 400. A Rafat retractor was placed and the mediastinum exposed, the pericardium was opened and the edges tacked to the wound. Cannulation sutures were placed in the ascending aorta and both superior inferior vena cava . Small cannulas were placed and aorto bicaval cardiopulmonary bypass was started. Cardioplegia cannulas were placed and then the ascending aorta was clamped. One liter of cold blood cardioplegia was given in an antegrade and retrograde fashion to achieved diastolic arrest and further doses every 15 minutes thereafter.  I completed anastomosis between the LIMA and the mid LAD using a 7-0 Prolene continuous suture in end-to-side fashion.  I performed significant dissection identified the artery and I used electrocautery to control small venous bleeding in the septal muscle.  The anastomosis was checked and then the small bulldog was reapplied to the pedicle.  Following, a left atriotomy was performed through the right interatrial groove. The left atrial cavity and the mitral valve were exposed using self retracted blades attached to the retractor. The valve explored and identified a wide P2 prolapse with a cleft between P2 and P3.  I used a 5-0 Prolene suture to close the cleft between P2 and P3 and I placed 4-0 Garrison-Reyes pledgeted sutures in the posterior belly of both the anterolateral and posterior medial papillary muscles.  I anchored each suture on each side of the prolapse where the chordae were ruptured.  I adjusted the length and placed 10 kn.  The preliminary water test was favorable.  Then I closed the left atrial appendage with a single layer of 3-0  Prolene continuous suture.      MV REPAIR:  The mitral valve was systematically evaluated to identify the etiology using valve hooks. The problem was as described before P2 prolapse. Then, 2.0 Ethibond suture were placed in a plicating fashion from trigone to trigone posteriorly around the annulus. A ring was selected by measuring the inter-trigonal distance and the AP distance of the anterior leaflet. The sutures were passed through the sewing ring of the 36 mm ATS flexible band and the knots secured. The valve was tested by injecting saline and no residual leak was seen. Then the atrium was closed using a 3.0 prolene running sutures, the chamber de-aired and suture tied down.  Of note, I used the core knot device to secure the ring in place.  The patient was systemically rewarmed, t with steep suction on the aortic vent he warm dose of cardioplegia was given and then the aortic clamp removed .the LIMA bulldog clamp was removed as well. All anastomoses were checked and had good flow and morphology. The suture lines were checked for hemostasis as well. The left pleural space was suctioned and the lungs ventilated. The heart was paced till regular atrial rhythm resumed. I allowed the heart to eject and the left heart chambers were de-aired using the standard techniques under LEO guidance. Once hemodynamics were acceptable, then the CPB was discontinued and the venous and cardioplegia cannulas removed. The matching dose of protamine was given and the aortic cannula removed as well. AV temporary wires and pleural and mediastinal chest tubes were placed and the wound sprayed with platelet rich plasma.  The sternum was closed with single and double wires and soft tissue in layers of reabsorbable material. The wounds were covered with sterile dressings.    Specimen removed: None    CPB time: 93 minutes    Aortic clamp time: 79 minutes       Complications: None           Disposition: Cardiovascular recovery room            Condition: Critical but stable.

## 2024-05-19 ENCOUNTER — APPOINTMENT (OUTPATIENT)
Dept: GENERAL RADIOLOGY | Facility: HOSPITAL | Age: 84
DRG: 219 | End: 2024-05-19
Payer: MEDICARE

## 2024-05-19 LAB
ANION GAP SERPL CALCULATED.3IONS-SCNC: 6 MMOL/L (ref 5–15)
BUN SERPL-MCNC: 10 MG/DL (ref 8–23)
BUN/CREAT SERPL: 13.3 (ref 7–25)
CALCIUM SPEC-SCNC: 8.5 MG/DL (ref 8.6–10.5)
CHLORIDE SERPL-SCNC: 108 MMOL/L (ref 98–107)
CO2 SERPL-SCNC: 26 MMOL/L (ref 22–29)
CREAT SERPL-MCNC: 0.75 MG/DL (ref 0.76–1.27)
DEPRECATED RDW RBC AUTO: 44.7 FL (ref 37–54)
EGFRCR SERPLBLD CKD-EPI 2021: 90.7 ML/MIN/1.73
ERYTHROCYTE [DISTWIDTH] IN BLOOD BY AUTOMATED COUNT: 12.9 % (ref 12.3–15.4)
GLUCOSE BLDC GLUCOMTR-MCNC: 146 MG/DL (ref 70–105)
GLUCOSE BLDC GLUCOMTR-MCNC: 155 MG/DL (ref 70–105)
GLUCOSE BLDC GLUCOMTR-MCNC: 158 MG/DL (ref 70–105)
GLUCOSE BLDC GLUCOMTR-MCNC: 181 MG/DL (ref 70–105)
GLUCOSE BLDC GLUCOMTR-MCNC: 205 MG/DL (ref 70–105)
GLUCOSE BLDC GLUCOMTR-MCNC: 220 MG/DL (ref 70–105)
GLUCOSE BLDC GLUCOMTR-MCNC: 225 MG/DL (ref 70–105)
GLUCOSE BLDC GLUCOMTR-MCNC: 235 MG/DL (ref 70–105)
GLUCOSE SERPL-MCNC: 141 MG/DL (ref 65–99)
HCT VFR BLD AUTO: 31.1 % (ref 37.5–51)
HGB BLD-MCNC: 10.2 G/DL (ref 13–17.7)
MCH RBC QN AUTO: 30.8 PG (ref 26.6–33)
MCHC RBC AUTO-ENTMCNC: 32.8 G/DL (ref 31.5–35.7)
MCV RBC AUTO: 94 FL (ref 79–97)
PLATELET # BLD AUTO: 101 10*3/MM3 (ref 140–450)
PMV BLD AUTO: 10.2 FL (ref 6–12)
POTASSIUM SERPL-SCNC: 4 MMOL/L (ref 3.5–5.2)
RBC # BLD AUTO: 3.31 10*6/MM3 (ref 4.14–5.8)
SODIUM SERPL-SCNC: 140 MMOL/L (ref 136–145)
WBC NRBC COR # BLD AUTO: 9.59 10*3/MM3 (ref 3.4–10.8)

## 2024-05-19 PROCEDURE — 25010000002 FUROSEMIDE PER 20 MG

## 2024-05-19 PROCEDURE — 71045 X-RAY EXAM CHEST 1 VIEW: CPT

## 2024-05-19 PROCEDURE — 93005 ELECTROCARDIOGRAM TRACING: CPT

## 2024-05-19 PROCEDURE — 93005 ELECTROCARDIOGRAM TRACING: CPT | Performed by: NURSE PRACTITIONER

## 2024-05-19 PROCEDURE — 25010000002 CEFAZOLIN PER 500 MG: Performed by: NURSE PRACTITIONER

## 2024-05-19 PROCEDURE — 25010000002 ENOXAPARIN PER 10 MG: Performed by: THORACIC SURGERY (CARDIOTHORACIC VASCULAR SURGERY)

## 2024-05-19 PROCEDURE — 99233 SBSQ HOSP IP/OBS HIGH 50: CPT | Performed by: INTERNAL MEDICINE

## 2024-05-19 PROCEDURE — 93010 ELECTROCARDIOGRAM REPORT: CPT | Performed by: INTERNAL MEDICINE

## 2024-05-19 PROCEDURE — 25010000002 AMIODARONE IN DEXTROSE 5% 360-4.14 MG/200ML-% SOLUTION

## 2024-05-19 PROCEDURE — 82948 REAGENT STRIP/BLOOD GLUCOSE: CPT

## 2024-05-19 PROCEDURE — 63710000001 INSULIN LISPRO (HUMAN) PER 5 UNITS

## 2024-05-19 PROCEDURE — 85027 COMPLETE CBC AUTOMATED: CPT | Performed by: NURSE PRACTITIONER

## 2024-05-19 PROCEDURE — 80048 BASIC METABOLIC PNL TOTAL CA: CPT | Performed by: NURSE PRACTITIONER

## 2024-05-19 PROCEDURE — 25010000002 AMIODARONE IN DEXTROSE 5% 150-4.21 MG/100ML-% SOLUTION

## 2024-05-19 RX ORDER — AMIODARONE HYDROCHLORIDE 200 MG/1
200 TABLET ORAL EVERY 12 HOURS SCHEDULED
Status: DISCONTINUED | OUTPATIENT
Start: 2024-05-20 | End: 2024-05-20

## 2024-05-19 RX ORDER — INSULIN LISPRO 100 [IU]/ML
2-9 INJECTION, SOLUTION INTRAVENOUS; SUBCUTANEOUS
Status: DISCONTINUED | OUTPATIENT
Start: 2024-05-19 | End: 2024-05-22 | Stop reason: HOSPADM

## 2024-05-19 RX ORDER — DEXTROSE MONOHYDRATE 25 G/50ML
25 INJECTION, SOLUTION INTRAVENOUS
Status: DISCONTINUED | OUTPATIENT
Start: 2024-05-19 | End: 2024-05-22 | Stop reason: HOSPADM

## 2024-05-19 RX ORDER — AMIODARONE HYDROCHLORIDE 200 MG/1
200 TABLET ORAL ONCE
Status: COMPLETED | OUTPATIENT
Start: 2024-05-20 | End: 2024-05-20

## 2024-05-19 RX ORDER — AMIODARONE HYDROCHLORIDE 200 MG/1
200 TABLET ORAL EVERY 12 HOURS
Status: DISCONTINUED | OUTPATIENT
Start: 2024-05-30 | End: 2024-05-20

## 2024-05-19 RX ORDER — AMIODARONE HYDROCHLORIDE 200 MG/1
200 TABLET ORAL DAILY
Status: DISCONTINUED | OUTPATIENT
Start: 2024-06-14 | End: 2024-05-20

## 2024-05-19 RX ORDER — NICOTINE POLACRILEX 4 MG
15 LOZENGE BUCCAL
Status: DISCONTINUED | OUTPATIENT
Start: 2024-05-19 | End: 2024-05-22 | Stop reason: HOSPADM

## 2024-05-19 RX ORDER — FUROSEMIDE 10 MG/ML
20 INJECTION INTRAMUSCULAR; INTRAVENOUS ONCE
Status: COMPLETED | OUTPATIENT
Start: 2024-05-19 | End: 2024-05-19

## 2024-05-19 RX ORDER — IBUPROFEN 600 MG/1
1 TABLET ORAL
Status: DISCONTINUED | OUTPATIENT
Start: 2024-05-19 | End: 2024-05-22 | Stop reason: HOSPADM

## 2024-05-19 RX ORDER — POTASSIUM CHLORIDE 750 MG/1
10 TABLET, FILM COATED, EXTENDED RELEASE ORAL ONCE
Status: COMPLETED | OUTPATIENT
Start: 2024-05-19 | End: 2024-05-19

## 2024-05-19 RX ADMIN — POTASSIUM CHLORIDE 10 MEQ: 750 TABLET, EXTENDED RELEASE ORAL at 11:57

## 2024-05-19 RX ADMIN — CHLORHEXIDINE GLUCONATE, 0.12% ORAL RINSE 15 ML: 1.2 SOLUTION DENTAL at 09:46

## 2024-05-19 RX ADMIN — Medication 12.5 MG: at 11:55

## 2024-05-19 RX ADMIN — SODIUM CHLORIDE 2 G: 900 INJECTION INTRAVENOUS at 00:42

## 2024-05-19 RX ADMIN — MUPIROCIN 1 APPLICATION: 20 OINTMENT TOPICAL at 09:46

## 2024-05-19 RX ADMIN — ENOXAPARIN SODIUM 40 MG: 100 INJECTION SUBCUTANEOUS at 17:21

## 2024-05-19 RX ADMIN — HYDROCODONE BITARTRATE AND ACETAMINOPHEN 1 TABLET: 5; 325 TABLET ORAL at 21:20

## 2024-05-19 RX ADMIN — ASPIRIN 81 MG: 81 TABLET, COATED ORAL at 09:46

## 2024-05-19 RX ADMIN — POLYETHYLENE GLYCOL 3350 17 G: 17 POWDER, FOR SOLUTION ORAL at 20:32

## 2024-05-19 RX ADMIN — MUPIROCIN 1 APPLICATION: 20 OINTMENT TOPICAL at 20:32

## 2024-05-19 RX ADMIN — CHLORHEXIDINE GLUCONATE, 0.12% ORAL RINSE 15 ML: 1.2 SOLUTION DENTAL at 20:32

## 2024-05-19 RX ADMIN — SENNOSIDES AND DOCUSATE SODIUM 2 TABLET: 50; 8.6 TABLET ORAL at 20:29

## 2024-05-19 RX ADMIN — AMIODARONE HYDROCHLORIDE 0.5 MG/MIN: 1.8 INJECTION, SOLUTION INTRAVENOUS at 17:20

## 2024-05-19 RX ADMIN — INSULIN LISPRO 4 UNITS: 100 INJECTION, SOLUTION INTRAVENOUS; SUBCUTANEOUS at 17:20

## 2024-05-19 RX ADMIN — AMIODARONE HYDROCHLORIDE 150 MG: 1.5 INJECTION, SOLUTION INTRAVENOUS at 09:28

## 2024-05-19 RX ADMIN — HYDROCODONE BITARTRATE AND ACETAMINOPHEN 1 TABLET: 5; 325 TABLET ORAL at 00:48

## 2024-05-19 RX ADMIN — FUROSEMIDE 20 MG: 10 INJECTION, SOLUTION INTRAMUSCULAR; INTRAVENOUS at 11:55

## 2024-05-19 RX ADMIN — INSULIN LISPRO 4 UNITS: 100 INJECTION, SOLUTION INTRAVENOUS; SUBCUTANEOUS at 20:32

## 2024-05-19 RX ADMIN — Medication 12.5 MG: at 20:31

## 2024-05-19 RX ADMIN — HYDROCODONE BITARTRATE AND ACETAMINOPHEN 1 TABLET: 5; 325 TABLET ORAL at 10:51

## 2024-05-19 RX ADMIN — AMIODARONE HYDROCHLORIDE 1 MG/MIN: 1.8 INJECTION, SOLUTION INTRAVENOUS at 09:28

## 2024-05-19 RX ADMIN — SENNOSIDES AND DOCUSATE SODIUM 2 TABLET: 50; 8.6 TABLET ORAL at 09:46

## 2024-05-19 RX ADMIN — POLYETHYLENE GLYCOL 3350 17 G: 17 POWDER, FOR SOLUTION ORAL at 09:46

## 2024-05-19 RX ADMIN — ATORVASTATIN CALCIUM 40 MG: 40 TABLET, FILM COATED ORAL at 20:30

## 2024-05-19 NOTE — PROGRESS NOTES
" LOS: 2 days   Patient Care Team:  Alexandria Tubbs MD as PCP - General  Lenny Gilman MD as Consulting Physician (Cardiology)    Chief Complaint: Post-op     Subjective     Subjective:  Symptoms:  Stable.  No shortness of breath or chest pain.    Diet:  Adequate intake.  No nausea or vomiting.    Activity level: Returning to normal.    Pain:  He complains of pain that is mild.  Pain is well controlled.      Objective     Vital Signs  Temp:  [97.9 °F (36.6 °C)-99.3 °F (37.4 °C)] 98.3 °F (36.8 °C)  Heart Rate:  [66-80] 80  Resp:  [21-23] 21  BP: ()/(51-82) 140/82  Body mass index is 23.62 kg/m².    Intake/Output Summary (Last 24 hours) at 5/19/2024 0732  Last data filed at 5/19/2024 0700  Gross per 24 hour   Intake 2122 ml   Output 2480 ml   Net -358 ml     No intake/output data recorded.    Chest tube drainage last 8 hours:   Left pleural -- 10ml   Mediastinal chest tubes (1&2) -- 70ml    Wt Readings from Last 3 Encounters:   05/18/24 81.2 kg (179 lb 0.2 oz)   05/14/24 80.3 kg (177 lb)   05/08/24 79.9 kg (176 lb 2.4 oz)       Flowsheet Rows      Flowsheet Row First Filed Value   Admission Height 185.4 cm (73\") Documented at 05/17/2024 0600   Admission Weight 76.5 kg (168 lb 10.4 oz) Documented at 05/17/2024 0600            Objective:  General Appearance:  Comfortable, well-appearing and in no acute distress.    Vital signs: (most recent): Blood pressure 140/82, pulse 80, temperature 98.3 °F (36.8 °C), temperature source Oral, resp. rate 21, height 185.4 cm (73\"), weight 81.2 kg (179 lb 0.2 oz), SpO2 94%.  Vital signs are normal.  No fever.    Output: Producing urine and no stool output.    Lungs:  Normal effort and normal respiratory rate.  There are decreased breath sounds.    Heart: Normal rate.  Regular rhythm.  S1 normal and S2 normal.    Abdomen: Abdomen is soft.  Hypoactive bowel sounds.     Extremities: Normal range of motion.  There is no dependent edema.    Neurological: Patient is alert and " oriented to person, place and time.    Skin:  Warm and dry.  (Sternal dressing in place )              Results Review:        Results from last 7 days   Lab Units 05/19/24  0435 05/18/24  0307 05/17/24  1650 05/17/24  1324 05/17/24  1221   WBC 10*3/mm3 9.59 9.63  --   --  10.29   HEMOGLOBIN g/dL 10.2* 9.5*  --   --  11.1*   HEMOGLOBIN, POC g/dL  --   --  10.2*   < >  --    HEMATOCRIT % 31.1* 28.9*  --   --  33.2*   HEMATOCRIT POC %  --   --  30*   < >  --    PLATELETS 10*3/mm3 101* 96*  --   --  129*    < > = values in this interval not displayed.         PT/INR:    Protime   Date Value Ref Range Status   05/18/2024 11.2 9.6 - 11.7 Seconds Final   05/17/2024 11.5 9.6 - 11.7 Seconds Final   05/17/2024 14.9 (H) 9.6 - 11.7 Seconds Final   /  INR   Date Value Ref Range Status   05/18/2024 1.03 0.93 - 1.10 Final   05/17/2024 1.06 0.93 - 1.10 Final   05/17/2024 1.40 (H) 0.93 - 1.10 Final       Results from last 7 days   Lab Units 05/19/24  0435 05/18/24  0307 05/17/24  1904 05/17/24  1832 05/17/24  1650 05/17/24  1324 05/17/24  1221   SODIUM mmol/L 140 141  --   --   --   --  145   POTASSIUM mmol/L 4.0 4.2 4.2   < >  --   --  3.9   CHLORIDE mmol/L 108* 111*  --   --   --   --  111*   CO2 mmol/L 26.0 23.0  --   --   --   --  24.0   BUN mg/dL 10 13  --   --   --   --  11   CREATININE mg/dL 0.75* 0.82  --   --  0.84   < > 0.80   GLUCOSE mg/dL 141* 135*  --   --   --   --  126*   CALCIUM mg/dL 8.5* 8.5*  --   --   --   --  8.4*    < > = values in this interval not displayed.         Scheduled Meds:  aspirin, 81 mg, Oral, Daily  atorvastatin, 40 mg, Oral, Nightly  chlorhexidine, 15 mL, Mouth/Throat, Q12H  enoxaparin, 40 mg, Subcutaneous, Q24H  mupirocin, , Each Nare, BID  pantoprazole, 40 mg, Oral, Q AM  polyethylene glycol, 17 g, Oral, BID  senna-docusate sodium, 2 tablet, Oral, BID        Infusions:  insulin, 0-100 Units/hr, Last Rate: 1 Units/hr (05/19/24 0659)  niCARdipine, 5-15 mg/hr, Last Rate: Stopped (05/17/24  1333)  nitroglycerin, 5-50 mcg/min, Last Rate: Stopped (05/17/24 1333)  norepinephrine, 0.02-0.06 mcg/kg/min, Last Rate: Stopped (05/17/24 2113)  sodium chloride, 30 mL/hr, Last Rate: 30 mL/hr (05/18/24 1627)          Assessment & Plan         CAD (coronary artery disease)    Mitral valve disease      Assessment & Plan    Severe mitral regurgitation -- s/p Mvr, NÉSTOR ligation, lima to LAD with Dr. Grier 5/17/24  Single vessel CAD  Normal LV systolic function---EF 56-60%  HTN  HLD  DM type 2    POD2:     Patient looks great this morning, sitting in bedside chair   Patient just on insulin gtt, transition to SSI   Just went into a.fib with RVR this morning, amiodarone gtt, keep central line for now  Discontinue chest tubes and beltre catheter   Patient weaned to RA, encourage IS   Will diurese today, congestion on CXR and weight up  Mobilize and continue routine care     Joseph Noe PA-C  05/19/24  07:32 EDT

## 2024-05-19 NOTE — PROGRESS NOTES
Cardiology Progress Note      PATIENT IDENTIFICATION    Name: Maurice Alvarado  Age: 83 y.o. Sex: male : 1940  MRN: [id number]    Requesting Provider    Sean Grier MD     LOS: 2 days       Reason For Followup:  Coronary artery disease  Valvular heart disease    Subjective:    Interval History:  Seen and examined.  Chart and labs reviewed.  Patient now on atrial fibrillation requiring IV amiodarone.  Rate is somewhat improved.  Some mild sternal discomfort.      Review of Systems:  A complete review of systems was undertaken with the pertinent cardiovascular findings listed in history of present illness and all other systems being negative.     Assessment & Plan    Impressions:  Coronary artery disease status post single-vessel CABG this admission     LIMA-LAD  Valvular heart disease status post mitral valve repair, and left atrial appendage ligation this admission  Postoperative atrial fibrillation  History of hypertension  Diabetes  Hyperlipidemia    Recommendations:  Continue with routine postoperative care  Pacer wires as per surgery  Will hold off on initiating beta-blocker until confident that we will no longer need pacing  Continue with IV amiodarone for rate/rhythm control  Increase activity as tolerated  Monitor rate and rhythm        Objective:    Medication Review:   Scheduled Meds:[START ON 2024] amiodarone, 200 mg, Oral, Once   Followed by  [START ON 2024] amiodarone, 200 mg, Oral, Q12H   Followed by  [START ON 2024] amiodarone, 200 mg, Oral, Q12H   Followed by  [START ON 2024] amiodarone, 200 mg, Oral, Daily  aspirin, 81 mg, Oral, Daily  atorvastatin, 40 mg, Oral, Nightly  chlorhexidine, 15 mL, Mouth/Throat, Q12H  enoxaparin, 40 mg, Subcutaneous, Q24H  insulin lispro, 2-9 Units, Subcutaneous, 4x Daily AC & at Bedtime  metoprolol tartrate, 12.5 mg, Oral, Q12H  mupirocin, , Each Nare, BID  pantoprazole, 40 mg, Oral, Q AM  polyethylene glycol, 17 g, Oral, BID  potassium  chloride, 10 mEq, Oral, Once  senna-docusate sodium, 2 tablet, Oral, BID      Continuous Infusions:amiodarone, 1 mg/min, Last Rate: 1 mg/min (05/19/24 0928)   Followed by  amiodarone, 0.5 mg/min  niCARdipine, 5-15 mg/hr, Last Rate: Stopped (05/17/24 1333)  nitroglycerin, 5-50 mcg/min, Last Rate: Stopped (05/17/24 1333)  norepinephrine, 0.02-0.06 mcg/kg/min, Last Rate: Stopped (05/17/24 2113)  sodium chloride, 30 mL/hr, Last Rate: 30 mL/hr (05/18/24 1627)      PRN Meds:.  acetaminophen **OR** acetaminophen **OR** acetaminophen    Calcium Replacement - Follow Nurse / BPA Driven Protocol    dextrose    dextrose    glucagon (human recombinant)    HYDROcodone-acetaminophen    Magnesium Cardiology Dose Replacement - Follow Nurse / BPA Driven Protocol    meperidine    Morphine **AND** naloxone    niCARdipine    nitroglycerin    nitroglycerin    norepinephrine    ondansetron    Phosphorus Replacement - Follow Nurse / BPA Driven Protocol    Potassium Replacement - Follow Nurse / BPA Driven Protocol    vasopressin      CAD (coronary artery disease)    Mitral valve disease         Physical Exam:    General: Alert, cooperative, no distress, appears stated age  Head:  Normocephalic, atraumatic, mucous membranes moist  Eyes:  Conjunctivae/corneas clear, EOMs intact     Neck:  Supple,  no bruit  Lungs:  Coarse and diminished  Chest wall: Tenderness at incision  Heart::  Irregular.  Tachycardic.  S1 and S2 normal, 1/6 holosystolic murmur.  No rub or gallop  Abdomen: Soft, nontender, nondistended, bowel sounds active  Extremities: No cyanosis, clubbing.  Edema noted  Pulses: Diminished pedal pulses  Skin:  No rashes or lesions  Neuro/psych: A&O x3. CN II through XII are grossly intact with appropriate affect    Vital Signs:  Vitals:    05/19/24 0700 05/19/24 0800 05/19/24 0900 05/19/24 1000   BP: 151/82 165/90 128/83 128/93   Patient Position:       Pulse: 80 80 (!) 123 100   Resp:       Temp:       TempSrc:       SpO2: 94% 95% 90%  94%   Weight:       Height:         Wt Readings from Last 1 Encounters:   05/18/24 81.2 kg (179 lb 0.2 oz)       Intake/Output Summary (Last 24 hours) at 5/19/2024 1155  Last data filed at 5/19/2024 0700  Gross per 24 hour   Intake 1882 ml   Output 2480 ml   Net -598 ml         Results Review:     CBC    Results from last 7 days   Lab Units 05/19/24  0435 05/18/24  0307 05/17/24  1650 05/17/24  1525 05/17/24  1422 05/17/24  1324 05/17/24  1221 05/17/24  1036 05/17/24  1029 05/17/24  0747 05/14/24  0853   WBC 10*3/mm3 9.59 9.63  --   --   --   --  10.29  --  8.61  --  5.31   HEMOGLOBIN g/dL 10.2* 9.5*  --   --   --   --  11.1*  --  9.6*  --  13.3   HEMOGLOBIN, POC g/dL  --   --  10.2* 11.1* 11.1* 10.8*  --    < >  --    < >  --    PLATELETS 10*3/mm3 101* 96*  --   --   --   --  129*  --  137*  --  191    < > = values in this interval not displayed.     Cr Clearance Estimated Creatinine Clearance: 88.7 mL/min (A) (by C-G formula based on SCr of 0.75 mg/dL (L)).  Coag   Results from last 7 days   Lab Units 05/18/24  0307 05/17/24  1221 05/17/24  1029 05/14/24  0853   INR  1.03 1.06 1.40* 0.94   APTT seconds  --  25.6 25.9 27.8     HbA1C   Lab Results   Component Value Date    HGBA1C 7.00 (H) 05/14/2024     Blood Glucose   Glucose   Date/Time Value Ref Range Status   05/19/2024 0926 225 (H) 70 - 105 mg/dL Final     Comment:     Serial Number: [id number]:  [id number]   05/19/2024 0653 155 (H) 70 - 105 mg/dL Final     Comment:     Serial Number: [id number]:  [id number]   05/19/2024 0408 146 (H) 70 - 105 mg/dL Final     Comment:     Serial Number: [id number]:  [id number]   05/19/2024 0301 158 (H) 70 - 105 mg/dL Final     Comment:     Serial Number: [id number]:  [id number]   05/19/2024 0003 181 (H) 70 - 105 mg/dL Final     Comment:     Serial Number: [id number]:  [id number]   05/18/2024 2228 194 (H) 70 - 105 mg/dL Final     Comment:     Serial Number: [id number]:  [id number]   05/18/2024  2123 183 (H) 70 - 105 mg/dL Final     Comment:     Serial Number: [id number]:  [id number]   05/18/2024 1932 207 (H) 70 - 105 mg/dL Final     Comment:     Serial Number: [id number]:  [id number]     Infection     CMP   Results from last 7 days   Lab Units 05/19/24  0435 05/18/24  0307 05/17/24  1904 05/17/24  1832 05/17/24  1650 05/17/24  1525 05/17/24  1422 05/17/24  1324 05/17/24  1221 05/17/24  1218 05/14/24  0853   SODIUM mmol/L 140 141  --   --   --   --   --   --  145  --  139   POTASSIUM mmol/L 4.0 4.2 4.2 2.4*  --   --   --   --  3.9  --  4.4   CHLORIDE mmol/L 108* 111*  --   --   --   --   --   --  111*  --  104   CO2 mmol/L 26.0 23.0  --   --   --   --   --   --  24.0  --  27.0   BUN mg/dL 10 13  --   --   --   --   --   --  11  --  14   CREATININE mg/dL 0.75* 0.82  --   --  0.84 0.83 0.77 0.77 0.80   < > 0.93   GLUCOSE mg/dL 141* 135*  --   --   --   --   --   --  126*  --  154*   ALBUMIN g/dL  --  3.8  --   --   --   --   --   --  4.0  --  4.5   BILIRUBIN mg/dL  --   --   --   --   --   --   --   --   --   --  2.2*   ALK PHOS U/L  --   --   --   --   --   --   --   --   --   --  71   AST (SGOT) U/L  --   --   --   --   --   --   --   --   --   --  17   ALT (SGPT) U/L  --   --   --   --   --   --   --   --   --   --  17    < > = values in this interval not displayed.     ABG    Results from last 7 days   Lab Units 05/18/24  1005 05/18/24  0315 05/17/24  1650 05/17/24  1525 05/17/24  1422 05/17/24  1324 05/17/24  1229 05/17/24  1218 05/17/24  1121   PH, ARTERIAL pH units 7.351  --  7.353 7.363 7.385 7.402  --  7.421 7.350   PCO2, ARTERIAL mm Hg 40.3  --  41.2 38.7 38.2 36.4  --  38.9 42.7   PO2 ART mm Hg 66.4*  --  98.5 108.1* 98.7 96.2  --  214.2* 230.0*   O2 SATURATION ART % 91.9*  --  97.3 98.0 97.6 97.5  --  99.8* 100.0*   BASE EXCESS ART mmol/L -3.1* -3.2* -2.5* -3.1* -1.9* -1.8* -1.2* 0.9 <0.0*     UA    Results from last 7 days   Lab Units 05/14/24  0853   NITRITE UA  Negative     RAIZA  No  "results found for: \"POCMETH\", \"POCAMPHET\", \"POCBARBITUR\", \"POCBENZO\", \"POCCOCAINE\", \"POCOPIATES\", \"POCOXYCODO\", \"POCPHENCYC\", \"POCPROPOXY\", \"POCTHC\", \"POCTRICYC\"  Lysis Labs   Results from last 7 days   Lab Units 05/19/24  0435 05/18/24  0307 05/17/24  1650 05/17/24  1525 05/17/24  1422 05/17/24  1324 05/17/24  1221 05/17/24  1036 05/17/24  1029 05/17/24  0747 05/14/24  0853   INR   --  1.03  --   --   --   --  1.06  --  1.40*  --  0.94   APTT seconds  --   --   --   --   --   --  25.6  --  25.9  --  27.8   FIBRINOGEN mg/dL  --   --   --   --   --   --   --   --  194*  --   --    HEMOGLOBIN g/dL 10.2* 9.5*  --   --   --   --  11.1*  --  9.6*  --  13.3   HEMOGLOBIN, POC g/dL  --   --  10.2* 11.1* 11.1* 10.8*  --    < >  --    < >  --    PLATELETS 10*3/mm3 101* 96*  --   --   --   --  129*  --  137*  --  191   CREATININE mg/dL 0.75* 0.82 0.84 0.83 0.77 0.77 0.80   < >  --   --  0.93    < > = values in this interval not displayed.     Radiology(recent) XR Chest 1 View    Result Date: 5/19/2024  Impression: No substantial interval change with similar small left and possible small right pneumothorax. Left basilar atelectasis and trace left pleural effusion. Electronically Signed: Koko Larsen MD  5/19/2024 10:29 AM EDT  Workstation ID: [id number]    XR Chest 1 View    Result Date: 5/18/2024  Impression: Postsurgical chest without significant interval change, including similar small left and suspected trace right pneumothoraces. Electronically Signed: Prasad Wasserman MD  5/18/2024 10:27 AM EDT  Workstation ID: [id number]    XR Abdomen KUB    Result Date: 5/17/2024  Impression: 1. Orogastric tube in place the tip projecting over the fundus of the stomach. Proximal sideport is near the level of the gastroesophageal junction. Electronically Signed: King Charles MD  5/17/2024 12:55 PM EDT  Workstation ID: [id number]    XR Chest 1 View    Result Date: 5/17/2024  Impression: 1.Interval postoperative changes with positioning of " support tubes and lines, as above. Of note, the tip of the enteric tube terminates just below the level of the diaphragm. Sideport is above the level of diaphragm. Consider further advancement into the  stomach. 2.Left pneumothorax, which may be small, but is difficult to quantify on this supine image. Possible tiny right pneumothorax. 3.Patchy bilateral airspace opacities, likely due to atelectasis. Electronically Signed: Carine Braxton  5/17/2024 12:55 PM EDT  Workstation ID: [id number]             Imaging Results (Last 24 Hours)       Procedure Component Value Units Date/Time    XR Chest 1 View [[id number]] Collected: 05/19/24 1024     Updated: 05/19/24 1031    Narrative:      XR CHEST 1 VW    Date of Exam: 5/19/2024 5:45 AM EDT    Indication: Post-Op Heart Surgery    Comparison:  5/18/2024    Findings:  Interval removal of pulmonary arterial catheter. Median sternotomy wires. Mediastinal and chest tubes. Right internal jugular central venous catheter/sheath.    Cardiac size is similar to prior exam. Again seen is a small left and possible small right pneumothorax, similar to prior exam. Left basilar atelectasis. Trace left pleural effusion. No evidence of acute osseous abnormalities. Visualized upper abdomen is   unremarkable.      Impression:      Impression:  No substantial interval change with similar small left and possible small right pneumothorax. Left basilar atelectasis and trace left pleural effusion.      Electronically Signed: Koko Larsen MD    5/19/2024 10:29 AM EDT    Workstation ID: [id number]            Cardiac Studies:  Echo- Results for orders placed in visit on 05/17/24    Intra-Op Anesthesia LEO    Narrative  Intra-Op Anesthesia LEO    Procedure Performed: Intra-Op Anesthesia LEO  Start Time:  End Time:    Preanesthesia Checklist:  Patient identified, IV assessed, risks and benefits discussed, monitors and equipment assessed, procedure being performed at surgeon's request and anesthesia  consent obtained.    General Procedure Information  Diagnostic Indications for Echo:  assessment of surgical repair and hemodynamic monitoring  Location performed:  OR  Intubated  Bite block placed  Heart visualized  Probe Insertion:  Easy  Probe Type:  Biplane and multiplane  Modalities:  Color flow mapping, pulse wave Doppler and continuous wave Doppler    Echocardiographic and Doppler Measurements    Ventricles    Right Ventricle:  Cavity size normal.  Hypertrophy not present.  Thrombus not present.  Global function normal.  Ejection Fraction 50%.  Left Ventricle:  Cavity size normal.  Thrombus not present.  Global Function normal.  Ejection Fraction 60%.    Ventricular Regional Function:  1- Basal Anteroseptal:  normal  2- Basal Anterior:  normal  3- Basal Anterolateral:  normal  4- Basal Inferolateral:  normal  5- Basal Inferior:  normal  6- Basal Inferoseptal:  normal  7- Mid Anteroseptal:  normal  8- Mid Anterior:  normal  9- Mid Anterolateral:  normal  10- Mid Inferolateral:  normal  11- Mid Inferior:  normal  12- Mid Inferoseptal:  normal  13- Apical Anterior:  normal  14- Apical Lateral:  normal  15- Apical Inferior:  normal  16- Apical Septal:  normal  17- [place]:  normal      Valves    Aortic Valve:  Annulus normal.  Stenosis not present.  Regurgitation absent.  Leaflets normal.  Leaflet motions normal.    Mitral Valve:  Annulus normal.  Stenosis not present.  Regurgitation severe.  Leaflets normal.  Leaflet motions prolapsed.  Specific leaflet segments with abnormal motions are described in the following comments:  P2    Tricuspid Valve:  Annulus normal.  Stenosis not present.  Regurgitation absent.  Leaflets normal.  Leaflet motions normal.  Pulmonic Valve:  Annulus normal.  Stenosis not present.  Regurgitation absent.      Aorta    Ascending Aorta:  Size normal.  Dissection not present.  Plaque thickness less than 3 mm.  Mobile plaque not present.  Aortic Arch:  Size normal.  Dissection not present.   Plaque thickness less than 3 mm.  Mobile plaque not present.  Descending Aorta:  Size normal.  Dissection not present.  Plaque thickness less than 3 mm.  Mobile plaque not present.      Atria    Right Atrium:  Size normal.  Left Atrium:  Size normal.  Spontaneous echo contrast not present.  Thrombus not present.  Tumor not present.  Device not present.  Left atrial appendage normal.      Septa        Ventricular Septum:  Intra-ventricular septum morphology normal.        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

## 2024-05-20 ENCOUNTER — APPOINTMENT (OUTPATIENT)
Dept: GENERAL RADIOLOGY | Facility: HOSPITAL | Age: 84
DRG: 219 | End: 2024-05-20
Payer: MEDICARE

## 2024-05-20 LAB
ANION GAP SERPL CALCULATED.3IONS-SCNC: 7 MMOL/L (ref 5–15)
BUN SERPL-MCNC: 12 MG/DL (ref 8–23)
BUN/CREAT SERPL: 17.1 (ref 7–25)
CALCIUM SPEC-SCNC: 8.4 MG/DL (ref 8.6–10.5)
CHLORIDE SERPL-SCNC: 102 MMOL/L (ref 98–107)
CO2 SERPL-SCNC: 27 MMOL/L (ref 22–29)
CREAT SERPL-MCNC: 0.7 MG/DL (ref 0.76–1.27)
DEPRECATED RDW RBC AUTO: 43.8 FL (ref 37–54)
EGFRCR SERPLBLD CKD-EPI 2021: 92.6 ML/MIN/1.73
ERYTHROCYTE [DISTWIDTH] IN BLOOD BY AUTOMATED COUNT: 12.7 % (ref 12.3–15.4)
GLUCOSE BLDC GLUCOMTR-MCNC: 160 MG/DL (ref 70–105)
GLUCOSE BLDC GLUCOMTR-MCNC: 179 MG/DL (ref 70–105)
GLUCOSE BLDC GLUCOMTR-MCNC: 191 MG/DL (ref 70–105)
GLUCOSE BLDC GLUCOMTR-MCNC: 223 MG/DL (ref 70–105)
GLUCOSE SERPL-MCNC: 163 MG/DL (ref 65–99)
HCT VFR BLD AUTO: 33.2 % (ref 37.5–51)
HGB BLD-MCNC: 10.8 G/DL (ref 13–17.7)
MCH RBC QN AUTO: 30.7 PG (ref 26.6–33)
MCHC RBC AUTO-ENTMCNC: 32.5 G/DL (ref 31.5–35.7)
MCV RBC AUTO: 94.3 FL (ref 79–97)
PLATELET # BLD AUTO: 107 10*3/MM3 (ref 140–450)
PMV BLD AUTO: 10.1 FL (ref 6–12)
POTASSIUM SERPL-SCNC: 4 MMOL/L (ref 3.5–5.2)
QT INTERVAL: 369 MS
QT INTERVAL: 449 MS
QT INTERVAL: 452 MS
QTC INTERVAL: 411 MS
QTC INTERVAL: 426 MS
QTC INTERVAL: 439 MS
RBC # BLD AUTO: 3.52 10*6/MM3 (ref 4.14–5.8)
SODIUM SERPL-SCNC: 136 MMOL/L (ref 136–145)
WBC NRBC COR # BLD AUTO: 9.48 10*3/MM3 (ref 3.4–10.8)

## 2024-05-20 PROCEDURE — 80048 BASIC METABOLIC PNL TOTAL CA: CPT | Performed by: NURSE PRACTITIONER

## 2024-05-20 PROCEDURE — 82948 REAGENT STRIP/BLOOD GLUCOSE: CPT

## 2024-05-20 PROCEDURE — 93005 ELECTROCARDIOGRAM TRACING: CPT | Performed by: NURSE PRACTITIONER

## 2024-05-20 PROCEDURE — 97110 THERAPEUTIC EXERCISES: CPT

## 2024-05-20 PROCEDURE — 25010000002 ENOXAPARIN PER 10 MG: Performed by: THORACIC SURGERY (CARDIOTHORACIC VASCULAR SURGERY)

## 2024-05-20 PROCEDURE — 97530 THERAPEUTIC ACTIVITIES: CPT

## 2024-05-20 PROCEDURE — 99233 SBSQ HOSP IP/OBS HIGH 50: CPT | Performed by: INTERNAL MEDICINE

## 2024-05-20 PROCEDURE — 93010 ELECTROCARDIOGRAM REPORT: CPT | Performed by: INTERNAL MEDICINE

## 2024-05-20 PROCEDURE — 71045 X-RAY EXAM CHEST 1 VIEW: CPT

## 2024-05-20 PROCEDURE — 97535 SELF CARE MNGMENT TRAINING: CPT

## 2024-05-20 PROCEDURE — 97116 GAIT TRAINING THERAPY: CPT

## 2024-05-20 PROCEDURE — 93005 ELECTROCARDIOGRAM TRACING: CPT

## 2024-05-20 PROCEDURE — 85027 COMPLETE CBC AUTOMATED: CPT | Performed by: NURSE PRACTITIONER

## 2024-05-20 PROCEDURE — 63710000001 INSULIN LISPRO (HUMAN) PER 5 UNITS

## 2024-05-20 PROCEDURE — 99024 POSTOP FOLLOW-UP VISIT: CPT | Performed by: THORACIC SURGERY (CARDIOTHORACIC VASCULAR SURGERY)

## 2024-05-20 PROCEDURE — 25010000002 AMIODARONE IN DEXTROSE 5% 360-4.14 MG/200ML-% SOLUTION

## 2024-05-20 RX ORDER — AMIODARONE HYDROCHLORIDE 200 MG/1
200 TABLET ORAL
Status: DISCONTINUED | OUTPATIENT
Start: 2024-05-20 | End: 2024-05-22 | Stop reason: HOSPADM

## 2024-05-20 RX ORDER — TAMSULOSIN HYDROCHLORIDE 0.4 MG/1
0.4 CAPSULE ORAL DAILY
Status: DISCONTINUED | OUTPATIENT
Start: 2024-05-20 | End: 2024-05-22 | Stop reason: HOSPADM

## 2024-05-20 RX ADMIN — INSULIN LISPRO 4 UNITS: 100 INJECTION, SOLUTION INTRAVENOUS; SUBCUTANEOUS at 21:04

## 2024-05-20 RX ADMIN — AMIODARONE HYDROCHLORIDE 200 MG: 200 TABLET ORAL at 08:57

## 2024-05-20 RX ADMIN — INSULIN LISPRO 2 UNITS: 100 INJECTION, SOLUTION INTRAVENOUS; SUBCUTANEOUS at 08:15

## 2024-05-20 RX ADMIN — ACETAMINOPHEN 650 MG: 325 TABLET, FILM COATED ORAL at 12:08

## 2024-05-20 RX ADMIN — ASPIRIN 81 MG: 81 TABLET, COATED ORAL at 08:14

## 2024-05-20 RX ADMIN — INSULIN LISPRO 2 UNITS: 100 INJECTION, SOLUTION INTRAVENOUS; SUBCUTANEOUS at 16:41

## 2024-05-20 RX ADMIN — TAMSULOSIN HYDROCHLORIDE 0.4 MG: 0.4 CAPSULE ORAL at 12:02

## 2024-05-20 RX ADMIN — ATORVASTATIN CALCIUM 40 MG: 40 TABLET, FILM COATED ORAL at 21:04

## 2024-05-20 RX ADMIN — MUPIROCIN 1 APPLICATION: 20 OINTMENT TOPICAL at 12:07

## 2024-05-20 RX ADMIN — MUPIROCIN 1 APPLICATION: 20 OINTMENT TOPICAL at 21:04

## 2024-05-20 RX ADMIN — PANTOPRAZOLE SODIUM 40 MG: 40 TABLET, DELAYED RELEASE ORAL at 08:14

## 2024-05-20 RX ADMIN — ENOXAPARIN SODIUM 40 MG: 100 INJECTION SUBCUTANEOUS at 16:41

## 2024-05-20 RX ADMIN — INSULIN LISPRO 2 UNITS: 100 INJECTION, SOLUTION INTRAVENOUS; SUBCUTANEOUS at 13:05

## 2024-05-20 RX ADMIN — AMIODARONE HYDROCHLORIDE 0.5 MG/MIN: 1.8 INJECTION, SOLUTION INTRAVENOUS at 05:33

## 2024-05-20 RX ADMIN — HYDROCODONE BITARTRATE AND ACETAMINOPHEN 1 TABLET: 5; 325 TABLET ORAL at 23:21

## 2024-05-20 NOTE — CASE MANAGEMENT/SOCIAL WORK
Continued Stay Note  CALLI Rangel     Patient Name: Maurice Alvarado  MRN: [id number]  Today's Date: 5/20/2024    Admit Date: 5/17/2024  Plan: DC plan: Return home w/ wife. CABG 5/17/2024.   Discharge Plan       Row Name 05/20/24 1225       Plan    Plan DC plan: Return home w/ wife. CABG 5/17/2024.    Plan Comments CM spoke with patient’s nurse and [facility] NP Selene Perkins to obtain clinical updates. DC barriers: pacer wires, POD 3.             Expected Discharge Date and Time       Expected Discharge Date Expected Discharge Time    May 22, 2024        Caroline Johnston, KRISTEN     Office Phone [phone]

## 2024-05-20 NOTE — PROGRESS NOTES
CARDIOLOGY PROGRESS NOTE:    Maurice Alvarado  83 y.o.  male  1940  [id number]      Referring Provider: Cardiac surgeon    Reason for follow-up: S/p cardiac surgery     Patient Care Team:  Alexandria Tubbs MD as PCP - Lenny Jain MD as Consulting Physician (Cardiology)    Subjective .  No chest pain or shortness of breath.  Had an episode of atrial fibrillation and is on amiodarone IV    Objective sitting in chair comfortably     Review of Systems   Constitutional: Negative for fever and malaise/fatigue.   HENT:  Negative for ear pain and nosebleeds.    Eyes:  Negative for blurred vision and double vision.   Cardiovascular:  Negative for chest pain, dyspnea on exertion and palpitations.   Respiratory:  Negative for cough and shortness of breath.    Skin:  Negative for rash.   Musculoskeletal:  Negative for joint pain.   Gastrointestinal:  Negative for abdominal pain, nausea and vomiting.   Neurological:  Negative for focal weakness and headaches.   Psychiatric/Behavioral:  Negative for depression. The patient is not nervous/anxious.    All other systems reviewed and are negative.      Allergies: Patient has no known allergies.    Scheduled Meds:amiodarone, 200 mg, Oral, Q24H  aspirin, 81 mg, Oral, Daily  atorvastatin, 40 mg, Oral, Nightly  enoxaparin, 40 mg, Subcutaneous, Q24H  insulin lispro, 2-9 Units, Subcutaneous, 4x Daily AC & at Bedtime  mupirocin, , Each Nare, BID  pantoprazole, 40 mg, Oral, Q AM  polyethylene glycol, 17 g, Oral, BID  senna-docusate sodium, 2 tablet, Oral, BID  tamsulosin, 0.4 mg, Oral, Daily      Continuous Infusions:niCARdipine, 5-15 mg/hr, Last Rate: Stopped (05/17/24 1333)      PRN Meds:.  acetaminophen **OR** acetaminophen **OR** acetaminophen    Calcium Replacement - Follow Nurse / BPA Driven Protocol    dextrose    dextrose    glucagon (human recombinant)    HYDROcodone-acetaminophen    Magnesium Cardiology Dose Replacement - Follow Nurse / BPA Driven Protocol     "meperidine    niCARdipine    nitroglycerin    ondansetron    Phosphorus Replacement - Follow Nurse / BPA Driven Protocol    Potassium Replacement - Follow Nurse / BPA Driven Protocol        VITAL SIGNS  Vitals:    05/20/24 0900 05/20/24 1000 05/20/24 1100 05/20/24 1200   BP: 115/57 126/61  123/62   BP Location:       Patient Position:       Pulse: 60 60 60 58   Resp:    14   Temp:    97.6 °F (36.4 °C)   TempSrc:    Oral   SpO2: 96% 97% 98% 95%   Weight:       Height:           Flowsheet Rows      Flowsheet Row First Filed Value   Admission Height 185.4 cm (73\") Documented at 05/17/2024 0600   Admission Weight 76.5 kg (168 lb 10.4 oz) Documented at 05/17/2024 0600             TELEMETRY: Sinus rhythm    Physical Exam:  Constitutional:       Appearance: Well-developed.   Eyes:      General: No scleral icterus.     Conjunctiva/sclera: Conjunctivae normal.      Pupils: Pupils are equal, round, and reactive to light.   HENT:      Head: Normocephalic and atraumatic.   Neck:      Vascular: No carotid bruit or JVD.   Pulmonary:      Effort: Pulmonary effort is normal.      Breath sounds: Normal breath sounds. No wheezing. No rales.   Cardiovascular:      Normal rate. Regular rhythm.   Pulses:     Intact distal pulses.   Abdominal:      General: Bowel sounds are normal.      Palpations: Abdomen is soft.   Musculoskeletal: Normal range of motion.      Cervical back: Normal range of motion and neck supple. Skin:     General: Skin is warm and dry.      Findings: No rash.   Neurological:      Mental Status: Alert.      Comments: No focal deficits          Results Review:   I reviewed the patient's new clinical results.  Lab Results (last 24 hours)       Procedure Component Value Units Date/Time    POC Glucose Once [[id number]]  (Abnormal) Collected: 05/20/24 1207    Specimen: Blood Updated: 05/20/24 1209     Glucose 160 mg/dL      Comment: Serial Number: [id number]:  [id number]       POC Glucose 4x Daily Before Meals & at " Bedtime [[id number]]  (Abnormal) Collected: 05/20/24 0753    Specimen: Blood Updated: 05/20/24 0755     Glucose 179 mg/dL      Comment: Serial Number: [id number]:  [id number]       Basic Metabolic Panel [[id number]]  (Abnormal) Collected: 05/20/24 0417    Specimen: Blood Updated: 05/20/24 0449     Glucose 163 mg/dL      BUN 12 mg/dL      Creatinine 0.70 mg/dL      Sodium 136 mmol/L      Potassium 4.0 mmol/L      Chloride 102 mmol/L      CO2 27.0 mmol/L      Calcium 8.4 mg/dL      BUN/Creatinine Ratio 17.1     Anion Gap 7.0 mmol/L      eGFR 92.6 mL/min/1.73     Narrative:      GFR Normal >60  Chronic Kidney Disease <60  Kidney Failure <15    The GFR formula is only valid for adults with stable renal function between ages 18 and 70.    CBC (No Diff) [[id number]]  (Abnormal) Collected: 05/20/24 0417    Specimen: Blood Updated: 05/20/24 0422     WBC 9.48 10*3/mm3      RBC 3.52 10*6/mm3      Hemoglobin 10.8 g/dL      Hematocrit 33.2 %      MCV 94.3 fL      MCH 30.7 pg      MCHC 32.5 g/dL      RDW 12.7 %      RDW-SD 43.8 fl      MPV 10.1 fL      Platelets 107 10*3/mm3     POC Glucose Once [[id number]]  (Abnormal) Collected: 05/19/24 2005    Specimen: Blood Updated: 05/19/24 2006     Glucose 235 mg/dL      Comment: Serial Number: [id number]:  [id number]       POC Glucose 4x Daily Before Meals & at Bedtime [[id number]]  (Abnormal) Collected: 05/19/24 1715    Specimen: Blood Updated: 05/19/24 1717     Glucose 205 mg/dL      Comment: Serial Number: [id number]:  [id number]               Imaging Results (Last 24 Hours)       Procedure Component Value Units Date/Time    XR Chest 1 View [[id number]] Collected: 05/20/24 1022     Updated: 05/20/24 1027    Narrative:      XR CHEST 1 VW    Date of Exam: 5/20/2024 10:06 AM EDT    Indication: f/u ptx    Comparison: AP portable chest 5/19/2024    Findings:  Small left apical pneumothorax measures approximately 1.8 cm thickness. 1.7 cm thickness right apical pneumothorax is  seen. These are not significantly changed compared to yesterday's study. Bandlike atelectasis in the left lower lobe is unchanged. The   right lung appears clear. The heart size is within normal limits with signs of prior median sternotomy and valve replacement. Right IJ approach introducer sheath appears similarly positioned.      Impression:      Impression:    1. Small biapical pneumothoraces do not appear significantly changed compared to 1 day prior.  2. Stable left basilar subsegmental atelectasis.      Electronically Signed: Gisell Coronado MD    5/20/2024 10:24 AM EDT    Workstation ID: [id number]    XR Chest 1 View [[id number]] Collected: 05/19/24 1726     Updated: 05/19/24 1731    Narrative:      XR CHEST 1 VW    Date of Exam: 5/19/2024 2:44 PM EDT    Indication: chest tube removal    Comparison: Chest radiograph same date.    Findings:  Right IJ sheath in unchanged position. Removal of chest tubes. Sternotomy. Cardiomediastinal silhouette is unchanged. Left basilar subsegmental atelectasis, similar. No dominant, focal consolidation. Similar small bilateral pneumothoraces. Small   bilateral pleural effusions, similar. Osseous structures are unchanged.      Impression:      Impression:  Similar small bilateral pneumothoraces after chest tube removal.      Electronically Signed: Prasad Wasserman MD    5/19/2024 5:29 PM EDT    Workstation ID: [id number]            EKG      I personally viewed and interpreted the patient's EKG/Telemetry data:    ECHOCARDIOGRAM:  Results for orders placed in visit on 05/17/24    Intra-Op Anesthesia LEO    Narrative  Intra-Op Anesthesia LEO    Procedure Performed: Intra-Op Anesthesia LEO  Start Time:  End Time:    Preanesthesia Checklist:  Patient identified, IV assessed, risks and benefits discussed, monitors and equipment assessed, procedure being performed at surgeon's request and anesthesia consent obtained.    General Procedure Information  Diagnostic Indications for Echo:   assessment of surgical repair and hemodynamic monitoring  Location performed:  OR  Intubated  Bite block placed  Heart visualized  Probe Insertion:  Easy  Probe Type:  Biplane and multiplane  Modalities:  Color flow mapping, pulse wave Doppler and continuous wave Doppler    Echocardiographic and Doppler Measurements    Ventricles    Right Ventricle:  Cavity size normal.  Hypertrophy not present.  Thrombus not present.  Global function normal.  Ejection Fraction 50%.  Left Ventricle:  Cavity size normal.  Thrombus not present.  Global Function normal.  Ejection Fraction 60%.    Ventricular Regional Function:  1- Basal Anteroseptal:  normal  2- Basal Anterior:  normal  3- Basal Anterolateral:  normal  4- Basal Inferolateral:  normal  5- Basal Inferior:  normal  6- Basal Inferoseptal:  normal  7- Mid Anteroseptal:  normal  8- Mid Anterior:  normal  9- Mid Anterolateral:  normal  10- Mid Inferolateral:  normal  11- Mid Inferior:  normal  12- Mid Inferoseptal:  normal  13- Apical Anterior:  normal  14- Apical Lateral:  normal  15- Apical Inferior:  normal  16- Apical Septal:  normal  17- Whipple:  normal      Valves    Aortic Valve:  Annulus normal.  Stenosis not present.  Regurgitation absent.  Leaflets normal.  Leaflet motions normal.    Mitral Valve:  Annulus normal.  Stenosis not present.  Regurgitation severe.  Leaflets normal.  Leaflet motions prolapsed.  Specific leaflet segments with abnormal motions are described in the following comments:  P2    Tricuspid Valve:  Annulus normal.  Stenosis not present.  Regurgitation absent.  Leaflets normal.  Leaflet motions normal.  Pulmonic Valve:  Annulus normal.  Stenosis not present.  Regurgitation absent.      Aorta    Ascending Aorta:  Size normal.  Dissection not present.  Plaque thickness less than 3 mm.  Mobile plaque not present.  Aortic Arch:  Size normal.  Dissection not present.  Plaque thickness less than 3 mm.  Mobile plaque not present.  Descending Aorta:  Size  normal.  Dissection not present.  Plaque thickness less than 3 mm.  Mobile plaque not present.      Atria    Right Atrium:  Size normal.  Left Atrium:  Size normal.  Spontaneous echo contrast not present.  Thrombus not present.  Tumor not present.  Device not present.  Left atrial appendage normal.      Septa        Ventricular Septum:  Intra-ventricular septum morphology normal.        Other Findings  Pericardium:  normal  Pleural Effusion:  none  Pulmonary Arteries:  normal      Anesthesia Information  Performed Personally  Anesthesiologist:  Wilber Del Valle MD      Echocardiogram Comments:  LEO placed easily no resistance, lubricated, bite guard      Pre cpb LEO  LV no wma EF 60  RV low nl SF  MV P2>P3 prolapse with ant and mainly posteriorly directed jet , mod sev MR +coanda  AV TV wnl      S/p cabg x 1 MV ring  LV no wma EF 60+  RV nl SF  TV mild mod TR  AV wnl  MV ring/ repair no MR       STRESS MYOVIEW:       CARDIAC CATHETERIZATION:  Results for orders placed during the hospital encounter of 05/08/24    Cardiac Catheterization/Vascular Study       OTHER:         Assessment & Plan     #1 mitral valve disease  Patient had severe mitral regurgitation by echo and possible echocardiogram and hence underwent mitral valve repair and is currently stable.  Patient is intubated and sedation has been weaned off  Patient has chest tube pressure draining very well  Patient's urine output is good  Blood pressure and heart rate are stable.  Patient is extubated and his chest tubes are removed and is tolerating oral medicines very well.    2.  Coronary disease  Patient had single-vessel coronary disease underwent a coronary bypass surgery x 1 vessel and is currently stable    3.  Hypertension  Patient blood pressure is currently stable and will be restarted on home medications manage extubated    4.  Diabetes  Patient was on metformin which is currently on sliding scale for now and will be restarted on home medicines and  is extubated    5.  Hyperlipidemia  Patient is on statin.    6.  Postop atrial fibrillation  Patient is postop atrial fibrillation is on IV amiodarone and is in sinus rhythm and I will stop the amiodarone IV and place him on oral amiodarone but no beta-blockers at this time.    Will also increase his activity as tolerated.        I discussed the patients findings and my recommendations with patient's nurse  Lenny Gilman MD  05/20/24  13:49 EDT

## 2024-05-20 NOTE — PLAN OF CARE
Assessment: Maurice Alvarado presents with ADL impairments affecting function including endurance / activity tolerance, range of motion (ROM), and strength. Pt alert and attentive with no reports of pain, though expresses some soreness in chest/sternum. Verbalizes 2/3 sternal precautions with min cues. Pt completed showering task with SBA and max assist to down gown and socks. Pt completed cardiac rehab exercises and demoes great understanding, encouraged to complete x3/day. Demonstrated functioning below baseline abilities indicate the need for continued skilled intervention while inpatient. Tolerating session today without incident. Will continue to follow and progress as tolerated.      Plan/Recommendations:   No ongoing therapy recommended post-acute care. No therapy needs.

## 2024-05-20 NOTE — PLAN OF CARE
Bed mobility - Supine to sit Min-A with for LE into bed. Pt provides demo and cues for improved supine to/from technique prior to pt completion.    Transfers - Supervision sit to/from stand  Static standing without UE support with SBA. No instability or LOB noted.   Ambulation - 200 feet Supervision and with rolling walker

## 2024-05-20 NOTE — THERAPY TREATMENT NOTE
Subjective: Pt agreeable to therapeutic plan of care.    Objective:     Bed mobility - Supine to sit Min-A with for LE into bed. Pt provides demo and cues for improved supine to/from technique prior to pt completion.    Transfers - Supervision sit to/from stand  Static standing without UE support with SBA. No instability or LOB noted.   Ambulation - 200 feet Supervision and with rolling walker    Phase 1 Cardiac Rehab Initiated - Acute Care    Cardiac Level IV Activities  Sitting tolerance: >10min and supported  Standing tolerance: 5-10min and supported    Precautions:  Mid-sternal incision; avoid scapular retraction and depression.  Cardiovascular impairment post-sx; encourage energy conservation strategies.    MET level equivalent: 1.4-2.0 (Self care ADLs in sitting / slow ambulation in room, light intensity activities)     Vitals: WNL    Pain: 1 VAS   Location: midsternal  Intervention for pain: Repositioned, Increased Activity, and Therapeutic Presence    Education: Provided education on the importance of mobility in the acute care setting, Verbal/Tactile Cues, Transfer Training, Gait Training, Energy conservation strategies, and Post-Op Precautions    Assessment: Maurice Alvarado presents with endurance and functional mobility impairments compared to his baseline. He is progressing quickly. Pt tolerates session today without incident. Will continue to follow and progress as tolerated. Trial gait without AD next session.      Plan/Recommendations:   If medically appropriate, No ongoing therapy recommended post-acute care. No therapy needs. Pt requires no DME at discharge. Pt has a RW.    Pt desires Home with family assist at discharge. Pt cooperative; agreeable to therapeutic recommendations and plan of care.         Basic Mobility 6-click:  Rollin = Total, A lot = 2, A little = 3; 4 = None  Supine>Sit:   1 = Total, A lot = 2, A little = 3; 4 = None   Sit>Stand with arms:  1 = Total, A lot = 2, A  little = 3; 4 = None  Bed>Chair:   1 = Total, A lot = 2, A little = 3; 4 = None  Ambulate in room:  1 = Total, A lot = 2, A little = 3; 4 = None  3-5 Steps with railin = Total, A lot = 2, A little = 3; 4 = None  Score: 20    Modified [place]: N/A = No pre-op stroke/TIA    Post-Tx Position: Supine with HOB Elevated, Alarms activated, and Call light and personal items within reach  PPE: gloves

## 2024-05-20 NOTE — THERAPY TREATMENT NOTE
Subjective: Pt agreeable to therapeutic plan of care. Verbalizes 2/3 sternal precautions, min cues   Cognition: arousal/alertness: Alert and Attentive    Objective:     Bed Mobility: N/A or Not attempted.   Functional Transfers: CGA, for safety  Balance: unsupported and standing SBA  Functional Ambulation: CGA, for safety    Bathing: SBA  ADL Position: unsupported sitting  ADL Comments: Pt with SBA to complete shower in unsupported sitting    Lower Body Dressing: Max-A  ADL Position: supported sitting  ADL Comments: Pt max a to doff/don socks and gown     Phase 1 Cardiac Rehab Initiated - Acute Care  Sitting tolerance: 5-10min and unsupported  Standing tolerance: 5-10min and unsupported    Precautions:  Mid-sternal incision; avoid scapular retraction and depression.  Cardiovascular impairment post-sx; encourage energy conservation strategies.    Therapeutic Exercise: Pt complete x10 reps per cardiac rehab exercises, demoes great understanding, encouraged to complete x3/day    MET level equivalent: 1.4-2.0 (Self care ADLs in sitting / slow ambulation in room, light intensity activities)     Vitals: WNL, RA 97%, BP sitting 122/63, Standing 102/62, sitting 129/68    Pain: 0 VAS  Location: N/A, though reports soreness  Interventions for pain: N/A  Education: Provided education on the importance of mobility in the acute care setting, Verbal/Tactile Cues, ADL training, Transfer Training, WB'ing status, and Post-Op Precautions    Assessment: Maurice Alvarado presents with ADL impairments affecting function including endurance / activity tolerance, range of motion (ROM), and strength. Pt alert and attentive with no reports of pain, though expresses some soreness in chest/sternum. Verbalizes 2/3 sternal precautions with min cues. Pt completed showering task with SBA and max assist to down gown and socks. Pt completed cardiac rehab exercises and demoes great understanding, encouraged to complete x3/day. Demonstrated  functioning below baseline abilities indicate the need for continued skilled intervention while inpatient. Tolerating session today without incident. Will continue to follow and progress as tolerated.     Plan/Recommendations:   No ongoing therapy recommended post-acute care. No therapy needs..    Pt desires Home with family assist at discharge. Pt cooperative; agreeable to therapeutic recommendations and plan of care.     Modified [place]: N/A = No pre-op stroke/TIA    Post-Tx Position: Up in Chair, Alarms activated, and Call light and personal items within reach  PPE: gloves

## 2024-05-20 NOTE — PROGRESS NOTES
S/P POD# 3 complex mitral valve repair/ CABG x1 with LIMA--Cherrie  EF 60-65% (LEO)    Subjective: No complaints today    Rapid atrial fib over weekend, now SB  Drips:  amio .5  Wt is up 5 kgs from preop      Intake/Output Summary (Last 24 hours) at 5/20/2024 0842  Last data filed at 5/20/2024 0800  Gross per 24 hour   Intake 2655 ml   Output 4255 ml   Net -1600 ml     Temp:  [97.6 °F (36.4 °C)-98.4 °F (36.9 °C)] 97.6 °F (36.4 °C)  Heart Rate:  [] 60  Resp:  [17-26] 17  BP: ()/(56-93) 122/68      Results from last 7 days   Lab Units 05/20/24 0417 05/19/24  0435 05/18/24  0307 05/17/24  1324 05/17/24  1221 05/17/24  1036 05/17/24  1029   WBC 10*3/mm3 9.48 9.59 9.63  --  10.29  --  8.61   HEMOGLOBIN g/dL 10.8* 10.2* 9.5*  --  11.1*  --  9.6*   HEMOGLOBIN, POC   --   --   --    < >  --    < >  --    HEMATOCRIT % 33.2* 31.1* 28.9*  --  33.2*  --  29.1*   HEMATOCRIT POC   --   --   --    < >  --    < >  --    PLATELETS 10*3/mm3 107* 101* 96*  --  129*  --  137*   INR   --   --  1.03  --  1.06  --  1.40*    < > = values in this interval not displayed.     Results from last 7 days   Lab Units 05/20/24 0417 05/19/24 0435 05/18/24  0307   CREATININE mg/dL 0.70*   < > 0.82   POTASSIUM mmol/L 4.0   < > 4.2   SODIUM mmol/L 136   < > 141   MAGNESIUM mg/dL  --   --  2.9*   PHOSPHORUS mg/dL  --   --  3.8    < > = values in this interval not displayed.       Physical Exam:  Neuro intact, nad, up in recliner, no family present  Tele: SB 50s  Diminished bases, 96% RA  Sternotomy healing well  Benign abd, + BM  No edema    Assessment/Plan:  Principal Problem:    CAD (coronary artery disease)  Active Problems:    Mitral valve disease    - Severe mitral regurgitation/single-vessel CAD, EF 60-65% (LEO)--s/p complex mitral valve repair (36 mm [employer] flexible band posterior annuloplasty) and chordal repair of P2 segment and cleftoplasty of P2-P3/CABG x 1 with LIMA to LAD (Cherrie)  - HTN--stable  - HLD--statin  - DM type  II--A1c 7  - Postop ABLA, expected  - Postop TCP, consumptive  - Postop atrial fibrillation--converted with amio, SR now    POD# 3.  A-fib over the weekend, now SR.  Patient noted to be bradycardic on Amio drip.  Per Dr. Gilman, no bb, only amio 200 daily.  DC central line.  Add home Flomax.  He will need an echo in 2 months to evaluate mitral valve repair.    [id number] Score for Atrial Fibrillation Stroke Risk on 5/20/2024  RESULT SUMMARY:  4 points  Stroke risk was 4.8% per year in >90,000 patients (the [place] Atrial Fibrillation Cohort Study) and 6.7% risk of stroke/TIA/systemic embolism.    Routine care--as above  D/w pt/nsg, Dr. Grier, Dr. Gilman  Anticipate home at discharge    Selene Bryan, APRN  5/20/2024  08:42 EDT

## 2024-05-21 PROBLEM — Z95.1 S/P CABG X 1: Status: ACTIVE | Noted: 2024-05-21

## 2024-05-21 PROBLEM — I48.91 POSTOPERATIVE ATRIAL FIBRILLATION: Status: ACTIVE | Noted: 2024-05-21

## 2024-05-21 PROBLEM — Z98.890 S/P MVR (MITRAL VALVE REPAIR): Status: ACTIVE | Noted: 2024-05-21

## 2024-05-21 PROBLEM — I97.89 POSTOPERATIVE ATRIAL FIBRILLATION: Status: ACTIVE | Noted: 2024-05-21

## 2024-05-21 LAB
ANION GAP SERPL CALCULATED.3IONS-SCNC: 8.5 MMOL/L (ref 5–15)
BUN SERPL-MCNC: 16 MG/DL (ref 8–23)
BUN/CREAT SERPL: 20.8 (ref 7–25)
CALCIUM SPEC-SCNC: 8.5 MG/DL (ref 8.6–10.5)
CHLORIDE SERPL-SCNC: 104 MMOL/L (ref 98–107)
CO2 SERPL-SCNC: 26.5 MMOL/L (ref 22–29)
CREAT SERPL-MCNC: 0.77 MG/DL (ref 0.76–1.27)
DEPRECATED RDW RBC AUTO: 43 FL (ref 37–54)
EGFRCR SERPLBLD CKD-EPI 2021: 89.9 ML/MIN/1.73
ERYTHROCYTE [DISTWIDTH] IN BLOOD BY AUTOMATED COUNT: 12.5 % (ref 12.3–15.4)
GLUCOSE BLDC GLUCOMTR-MCNC: 178 MG/DL (ref 70–105)
GLUCOSE BLDC GLUCOMTR-MCNC: 190 MG/DL (ref 70–105)
GLUCOSE BLDC GLUCOMTR-MCNC: 206 MG/DL (ref 70–105)
GLUCOSE BLDC GLUCOMTR-MCNC: 277 MG/DL (ref 70–105)
GLUCOSE SERPL-MCNC: 159 MG/DL (ref 65–99)
HCT VFR BLD AUTO: 31.4 % (ref 37.5–51)
HGB BLD-MCNC: 10.2 G/DL (ref 13–17.7)
MCH RBC QN AUTO: 30.3 PG (ref 26.6–33)
MCHC RBC AUTO-ENTMCNC: 32.5 G/DL (ref 31.5–35.7)
MCV RBC AUTO: 93.2 FL (ref 79–97)
PLATELET # BLD AUTO: 126 10*3/MM3 (ref 140–450)
PMV BLD AUTO: 10.6 FL (ref 6–12)
POTASSIUM SERPL-SCNC: 3.9 MMOL/L (ref 3.5–5.2)
POTASSIUM SERPL-SCNC: 4.3 MMOL/L (ref 3.5–5.2)
QT INTERVAL: 393 MS
QT INTERVAL: 398 MS
QT INTERVAL: 450 MS
QTC INTERVAL: 361 MS
QTC INTERVAL: 417 MS
QTC INTERVAL: 443 MS
RBC # BLD AUTO: 3.37 10*6/MM3 (ref 4.14–5.8)
SODIUM SERPL-SCNC: 139 MMOL/L (ref 136–145)
WBC NRBC COR # BLD AUTO: 7.59 10*3/MM3 (ref 3.4–10.8)

## 2024-05-21 PROCEDURE — 97116 GAIT TRAINING THERAPY: CPT

## 2024-05-21 PROCEDURE — 80048 BASIC METABOLIC PNL TOTAL CA: CPT | Performed by: NURSE PRACTITIONER

## 2024-05-21 PROCEDURE — 97535 SELF CARE MNGMENT TRAINING: CPT

## 2024-05-21 PROCEDURE — 84132 ASSAY OF SERUM POTASSIUM: CPT | Performed by: THORACIC SURGERY (CARDIOTHORACIC VASCULAR SURGERY)

## 2024-05-21 PROCEDURE — 93010 ELECTROCARDIOGRAM REPORT: CPT | Performed by: INTERNAL MEDICINE

## 2024-05-21 PROCEDURE — 97530 THERAPEUTIC ACTIVITIES: CPT

## 2024-05-21 PROCEDURE — 82948 REAGENT STRIP/BLOOD GLUCOSE: CPT

## 2024-05-21 PROCEDURE — 63710000001 INSULIN LISPRO (HUMAN) PER 5 UNITS

## 2024-05-21 PROCEDURE — 25010000002 ENOXAPARIN PER 10 MG: Performed by: THORACIC SURGERY (CARDIOTHORACIC VASCULAR SURGERY)

## 2024-05-21 PROCEDURE — 93005 ELECTROCARDIOGRAM TRACING: CPT

## 2024-05-21 PROCEDURE — 85027 COMPLETE CBC AUTOMATED: CPT | Performed by: NURSE PRACTITIONER

## 2024-05-21 PROCEDURE — 99232 SBSQ HOSP IP/OBS MODERATE 35: CPT | Performed by: INTERNAL MEDICINE

## 2024-05-21 PROCEDURE — 99024 POSTOP FOLLOW-UP VISIT: CPT | Performed by: THORACIC SURGERY (CARDIOTHORACIC VASCULAR SURGERY)

## 2024-05-21 PROCEDURE — 97110 THERAPEUTIC EXERCISES: CPT

## 2024-05-21 RX ORDER — UREA 10 %
5 LOTION (ML) TOPICAL NIGHTLY PRN
Status: DISCONTINUED | OUTPATIENT
Start: 2024-05-21 | End: 2024-05-22 | Stop reason: HOSPADM

## 2024-05-21 RX ORDER — AMOXICILLIN 250 MG
2 CAPSULE ORAL 2 TIMES DAILY PRN
Status: DISCONTINUED | OUTPATIENT
Start: 2024-05-21 | End: 2024-05-22 | Stop reason: HOSPADM

## 2024-05-21 RX ORDER — POTASSIUM CHLORIDE 20 MEQ/1
20 TABLET, EXTENDED RELEASE ORAL ONCE
Status: COMPLETED | OUTPATIENT
Start: 2024-05-21 | End: 2024-05-21

## 2024-05-21 RX ORDER — POLYETHYLENE GLYCOL 3350 17 G/17G
17 POWDER, FOR SOLUTION ORAL 2 TIMES DAILY PRN
Status: DISCONTINUED | OUTPATIENT
Start: 2024-05-21 | End: 2024-05-22 | Stop reason: HOSPADM

## 2024-05-21 RX ADMIN — PANTOPRAZOLE SODIUM 40 MG: 40 TABLET, DELAYED RELEASE ORAL at 05:24

## 2024-05-21 RX ADMIN — Medication 5 MG: at 22:20

## 2024-05-21 RX ADMIN — INSULIN LISPRO 4 UNITS: 100 INJECTION, SOLUTION INTRAVENOUS; SUBCUTANEOUS at 12:29

## 2024-05-21 RX ADMIN — INSULIN LISPRO 2 UNITS: 100 INJECTION, SOLUTION INTRAVENOUS; SUBCUTANEOUS at 08:24

## 2024-05-21 RX ADMIN — TAMSULOSIN HYDROCHLORIDE 0.4 MG: 0.4 CAPSULE ORAL at 08:25

## 2024-05-21 RX ADMIN — AMIODARONE HYDROCHLORIDE 200 MG: 200 TABLET ORAL at 08:25

## 2024-05-21 RX ADMIN — INSULIN LISPRO 6 UNITS: 100 INJECTION, SOLUTION INTRAVENOUS; SUBCUTANEOUS at 20:09

## 2024-05-21 RX ADMIN — POTASSIUM CHLORIDE 20 MEQ: 1500 TABLET, EXTENDED RELEASE ORAL at 08:25

## 2024-05-21 RX ADMIN — ASPIRIN 81 MG: 81 TABLET, COATED ORAL at 08:25

## 2024-05-21 RX ADMIN — ATORVASTATIN CALCIUM 40 MG: 40 TABLET, FILM COATED ORAL at 20:10

## 2024-05-21 RX ADMIN — MUPIROCIN 1 APPLICATION: 20 OINTMENT TOPICAL at 08:25

## 2024-05-21 RX ADMIN — ENOXAPARIN SODIUM 40 MG: 100 INJECTION SUBCUTANEOUS at 17:56

## 2024-05-21 RX ADMIN — INSULIN LISPRO 2 UNITS: 100 INJECTION, SOLUTION INTRAVENOUS; SUBCUTANEOUS at 17:56

## 2024-05-21 RX ADMIN — MUPIROCIN 1 APPLICATION: 20 OINTMENT TOPICAL at 20:09

## 2024-05-21 NOTE — PLAN OF CARE
Assessment: Maurice Alvarado presents with ADL impairments affecting function including endurance / activity tolerance and strength. Pt verbalizes 3/3 sternal precautions with min cues. Pt requested to practice bed mobility for when he returns home. OT provided education and min assist for sit<>sup and sup<>sit. X2 reps this date. Pt completed STS and ambulation with CGA for safety. Demonstrated functioning below baseline abilities indicate the need for continued skilled intervention while inpatient. Tolerating session today without incident. Will continue to follow and progress as tolerated.      Plan/Recommendations:   No ongoing therapy recommended post-acute care. No therapy needs.

## 2024-05-21 NOTE — PLAN OF CARE
Goal Outcome Evaluation:  Plan of Care Reviewed With: patient        Progress: improving  Outcome Evaluation: Patient is POD 4 s/p CABGx1 and MVR. Discontinued epicardial wires with no complications. Remains on room air. Ambulating well and practiced stairs with PT today. Plan of care ongoing.

## 2024-05-21 NOTE — PLAN OF CARE
Assessment: Maurice Alvarado presents with functional mobility impairments which indicate the need for skilled intervention. Tolerating session today without incident. Pt able to negotiate flight of stairs with SBA using single handrail. Mild dizziness reported while ascending steps. All vital signs stable. Will continue to follow and progress as tolerated.     Plan/Recommendations:   If medically appropriate, No ongoing therapy recommended post-acute care. No therapy needs. Pt requires no DME at discharge.     Pt desires Home with family assist at discharge. Pt cooperative; agreeable to therapeutic recommendations and plan of care.

## 2024-05-21 NOTE — DISCHARGE SUMMARY
Date of Admission: 5/17/2024  Date of Discharge: 5/22/2024    Discharge Diagnosis:   - Severe mitral regurgitation/single-vessel CAD, EF 60-65% (LEO)--s/p complex mitral valve repair (36 mm Medtronic flexible band posterior annuloplasty) and chordal repair of P2 segment and cleftoplasty of P2-P3/CABG x 1 with LIMA to LAD (Cherrie)  - HTN--stable  - HLD--statin  - DM type II--A1c 7  - Postop ABLA, expected  - Postop TCP, consumptive  - Postop atrial fibrillation--converted with amio, SR now    Presenting Problem/History of Present Illness  Mitral valve disease [I05.9]  CAD (coronary artery disease) [I25.10]     Hospital Course  Patient is a 83 y.o. male who presented from home the morning of surgery.  On 5/17/2024, he underwent complex mitral valve repair and CABG x 1 with KEITA per Dr. Grier.  Please see op note for full details.  He was extubated in a timely manner.  On POD 2 he went into rapid atrial flutter And was started on an amiodarone drip.  By POD 3 he converted to sinus bradycardia.  Per Dr. Leslie pineda, he was started on amiodarone 200 mg daily and beta-blockade was avoided due to his bradycardia.  On POD 4 his pacing wires were removed.  He has been ambulating, eating, and has had a postop BM.  He is being discharged to home.  He will need an echo in 2 months to evaluate mitral valve repair--order was placed.     Procedures Performed  Per Dr. Grier 5/17/2024  1.  Complex mitral valve repair with a 36 mm Medtronic flexible band posterior annuloplasty and chordal repair (4 Palmer-Reyes x 2) of the P2 segment and cleft to plasty of P2-P3.  (CPT code 27641)  2.  CABG x 1 with a LIMA to the LAD (CPT code 95940)       Consults:   Consults       Date and Time Order Name Status Description    5/17/2024 11:57 AM Inpatient Cardiology Consult Completed             Pertinent Test Results:    Lab Results   Component Value Date    WBC 7.62 05/22/2024    HGB 10.2 (L) 05/22/2024    HCT 31.9 (L) 05/22/2024    MCV 94.4 05/22/2024      05/22/2024      Lab Results   Component Value Date    GLUCOSE 149 (H) 05/22/2024    CALCIUM 8.6 05/22/2024     05/22/2024    K 4.0 05/22/2024    CO2 27.0 05/22/2024     05/22/2024    BUN 13 05/22/2024    CREATININE 0.73 (L) 05/22/2024    BCR 17.8 05/22/2024    ANIONGAP 9.0 05/22/2024     Lab Results   Component Value Date    INR 1.03 05/18/2024    PROTIME 11.2 05/18/2024         Condition on Discharge: Stable for discharge to home with family assistance.    Vital Signs  Temp:  [97.1 °F (36.2 °C)-98.7 °F (37.1 °C)] 98 °F (36.7 °C)  Heart Rate:  [56-70] 58  Resp:  [18-24] 24  BP: (105-151)/(58-73) 121/61      Discharge Disposition  Home or Self Care    Discharge Medications     Discharge Medications        New Medications        Instructions Start Date   amiodarone 200 MG tablet  Commonly known as: PACERONE   200 mg, Oral, Every 24 Hours Scheduled      aspirin 81 MG EC tablet   81 mg, Oral, Daily      atorvastatin 20 MG tablet  Commonly known as: LIPITOR   20 mg, Oral, Nightly      HYDROcodone-acetaminophen 5-325 MG per tablet  Commonly known as: NORCO   1 tablet, Oral, Every 6 Hours PRN             Continue These Medications        Instructions Start Date   CoQ-10 200 MG capsule   1 tablet, Oral, Daily      EQ Complete Multivitamin-Adult tablet tablet  Generic drug: multivitamin with minerals   Take 1 tablet by mouth Daily.      Flaxseed Oil oil   Take 1 capsule by mouth Daily.      metFORMIN 500 MG tablet  Commonly known as: GLUCOPHAGE   500 mg, Oral, 2 Times Daily With Meals      Super B-Complex capsule   Take 1 capsule by mouth Daily.      tamsulosin 0.4 MG capsule 24 hr capsule  Commonly known as: FLOMAX   1 capsule, Oral, 2 Times Daily      vitamin C 500 MG tablet  Commonly known as: ASCORBIC ACID   Take 1 tablet by mouth Daily.             Stop These Medications      lisinopril 10 MG tablet  Commonly known as: PRINIVIL,ZESTRIL     mupirocin 2 % ointment  Commonly known as: BACTROBAN               Discharge Diet:   Healthy Heart Diet    Activity at Discharge:   1. No driving for 2 weeks and off narcotic pain medications.  2. Shower daily. Clean incisions with warm water and antibacterial soap only. Do not put any lotion or ointments on incisions.  3. Ambulate for 10 minutes at least 3 times a day.  4. No heavy lifting > 10lbs until seen in office.   5. Take all medications as prescribed.     Follow-up Appointments  Future Appointments   Date Time Provider Department Center   6/12/2024 12:15 PM Selene Bryan APRN [facility]   6/17/2024  3:10 PM Lenny Gilman MD [facility]     Additional Instructions for the Follow-ups that You Need to Schedule       Discharge Follow-up with PCP   As directed       Currently Documented PCP:    Alexandria Tubbs MD    PCP Phone Number:    [phone]     Follow Up Details: in one month        Discharge Follow-up with Specialty: Cardiology; 2 Weeks   As directed      Specialty: Cardiology   Follow Up: 2 Weeks   Follow Up Details: Follow-up appt with Dr. Gilman on 6/17/2024 at 3:10 PM        Discharge Follow-up with Specified Provider: Cardiac Surgery; 2 Weeks   As directed      To: Cardiac Surgery   Follow Up: 2 Weeks   Follow Up Details: NP postop follow-up 6/12/2024 at 12:45 PM        Referral to Cardiac Rehab   As directed      Call MD With Problems / Concerns    Jun 21, 2024 (Approximate)      Instructions: Call for temp >101 or any surgical wound drainage    Order Comments: Instructions: Call for temp >101 or any surgical wound drainage                 Test Results Pending at Discharge    STS information:  Pt discharged on asa/statin, no beta blocker d/t bradycardia per Dr. Gilman.  Low dose amiodarone for PAF.  No anticoagulation d/t brevity of PAF per Dr. Grier.       DAKOTAH Espinoza  05/22/24  11:02 EDT

## 2024-05-21 NOTE — PROGRESS NOTES
" LOS: 4 days   Patient Care Team:  Alexandria Tubbs MD as PCP - General  Lenny Gilman MD as Consulting Physician (Cardiology)    Chief Complaint:   Post-op follow-up, s/p CABG/MVr    Subjective  Sitting up in chair.  Has already been walking with therapy in the [place].  States he is feeling better each day.  No new complaints    Vital Signs  Temp:  [97.6 °F (36.4 °C)-98.2 °F (36.8 °C)] 98.2 °F (36.8 °C)  Heart Rate:  [55-65] 57  Resp:  [14-15] 15  BP: (105-129)/(52-67) 126/63      05/18/24  0506 05/20/24  0700 05/21/24  0600   Weight: 81.2 kg (179 lb 0.2 oz) 81.8 kg (180 lb 6.4 oz) 82.1 kg (181 lb)     Body mass index is 23.88 kg/m².    Intake/Output Summary (Last 24 hours) at 5/21/2024 1139  Last data filed at 5/21/2024 1100  Gross per 24 hour   Intake 940 ml   Output 2420 ml   Net -1480 ml     I/O this shift:  In: 220 [P.O.:220]  Out: 675 [Urine:675]        Objective:  Vital signs: (most recent): Blood pressure 126/63, pulse 57, temperature 98.2 °F (36.8 °C), temperature source Oral, resp. rate 15, height 185.4 cm (73\"), weight 82.1 kg (181 lb), SpO2 95%.                Physical Exam:   General Appearance: awake and alert, no acute distress   Lungs: respirations regular and respirations unlabored   Heart: regular rhythm & normal rate and normal S1, S2   Abdomen: soft or nontender, + bowel sounds    Skin: sternal incision clean, dry, intact   Neuro: alert and oriented, no focal deficits.     Results Review:      WBC WBC   Date Value Ref Range Status   05/21/2024 7.59 3.40 - 10.80 10*3/mm3 Final   05/20/2024 9.48 3.40 - 10.80 10*3/mm3 Final   05/19/2024 9.59 3.40 - 10.80 10*3/mm3 Final      HGB Hemoglobin   Date Value Ref Range Status   05/21/2024 10.2 (L) 13.0 - 17.7 g/dL Final   05/20/2024 10.8 (L) 13.0 - 17.7 g/dL Final   05/19/2024 10.2 (L) 13.0 - 17.7 g/dL Final      HCT Hematocrit   Date Value Ref Range Status   05/21/2024 31.4 (L) 37.5 - 51.0 % Final   05/20/2024 33.2 (L) 37.5 - 51.0 % Final   05/19/2024 " "31.1 (L) 37.5 - 51.0 % Final      Platelets Platelets   Date Value Ref Range Status   05/21/2024 126 (L) 140 - 450 10*3/mm3 Final   05/20/2024 107 (L) 140 - 450 10*3/mm3 Final   05/19/2024 101 (L) 140 - 450 10*3/mm3 Final     Comment:     Result checked          PT/INR:  No results found for: \"PROTIME\"/No results found for: \"INR\"    Sodium Sodium   Date Value Ref Range Status   05/21/2024 139 136 - 145 mmol/L Final   05/20/2024 136 136 - 145 mmol/L Final   05/19/2024 140 136 - 145 mmol/L Final      Potassium Potassium   Date Value Ref Range Status   05/21/2024 3.9 3.5 - 5.2 mmol/L Final   05/20/2024 4.0 3.5 - 5.2 mmol/L Final   05/19/2024 4.0 3.5 - 5.2 mmol/L Final      Chloride Chloride   Date Value Ref Range Status   05/21/2024 104 98 - 107 mmol/L Final   05/20/2024 102 98 - 107 mmol/L Final   05/19/2024 108 (H) 98 - 107 mmol/L Final      Bicarbonate CO2   Date Value Ref Range Status   05/21/2024 26.5 22.0 - 29.0 mmol/L Final   05/20/2024 27.0 22.0 - 29.0 mmol/L Final   05/19/2024 26.0 22.0 - 29.0 mmol/L Final      BUN BUN   Date Value Ref Range Status   05/21/2024 16 8 - 23 mg/dL Final   05/20/2024 12 8 - 23 mg/dL Final   05/19/2024 10 8 - 23 mg/dL Final      Creatinine Creatinine   Date Value Ref Range Status   05/21/2024 0.77 0.76 - 1.27 mg/dL Final   05/20/2024 0.70 (L) 0.76 - 1.27 mg/dL Final   05/19/2024 0.75 (L) 0.76 - 1.27 mg/dL Final      Calcium Calcium   Date Value Ref Range Status   05/21/2024 8.5 (L) 8.6 - 10.5 mg/dL Final   05/20/2024 8.4 (L) 8.6 - 10.5 mg/dL Final   05/19/2024 8.5 (L) 8.6 - 10.5 mg/dL Final      Magnesium No results found for: \"MG\"     amiodarone, 200 mg, Oral, Q24H  aspirin, 81 mg, Oral, Daily  atorvastatin, 40 mg, Oral, Nightly  enoxaparin, 40 mg, Subcutaneous, Q24H  insulin lispro, 2-9 Units, Subcutaneous, 4x Daily AC & at Bedtime  mupirocin, , Each Nare, BID  pantoprazole, 40 mg, Oral, Q AM  polyethylene glycol, 17 g, Oral, BID  senna-docusate sodium, 2 tablet, Oral, " BID  tamsulosin, 0.4 mg, Oral, Daily      niCARdipine, 5-15 mg/hr, Last Rate: Stopped (05/17/24 1333)          CAD (coronary artery disease)    Mitral valve disease      Assessment & Plan    - Severe mitral regurgitation/single-vessel CAD, EF 60-65% (LEO)--s/p complex mitral valve repair (36 mm Medtronic flexible band posterior annuloplasty) and chordal repair of P2 segment and cleftoplasty of P2-P3/CABG x 1 with LIMA to LAD (Pagni)  - HTN--stable  - HLD--statin  - DM type II--A1c 7  - Postop ABLA, expected  - Postop TCP, consumptive  - Postop atrial fibrillation--converted with amio, SR now  -BPH-- home flomax     POD# 4.    Continue routine care.  On room air.   Replete K.  Tolerating p.o. Amio daily per cardiology.  Discontinue epicardial wires.  Mobilize and encourage pulm toilet.  Plan discharge home with home health tomorrow.  He will need an echo in 2 months to evaluate mitral valve repair.       Ryan Johnson PA-C  05/21/24  11:39 EDT

## 2024-05-21 NOTE — THERAPY TREATMENT NOTE
Subjective: Pt agreeable to therapeutic plan of care.    Objective:     Bed mobility - N/A or Not attempted. Pt up in chair upon arrival    Transfers - Supervision. Cued for sternal precautions    Ambulation - 80 feet + 80 feet + 150 feet Supervision withou tAD    Stairs - 12 steps SBA using single handrail with step-to pattern.     Phase 1 Cardiac Rehab Initiated - Acute Care    Sitting tolerance: >10min and supported  Standing tolerance: >10min and supported    Precautions:  Mid-sternal incision; avoid scapular retraction and depression.  Cardiovascular impairment post-sx; encourage energy conservation strategies.    Therapeutic Exercise: 10 reps UE and LE AROM in supported sitting    MET level equivalent: 3.0-3.5 (Standing ADLs / showering, slow stair climbing up to 2 flights, unlimited ambulation on flat surfaces up to 15mins)    Vitals: WNL on RA    Pain: 2 VAS   Location: Sternal incision  Intervention for pain: Therapeutic Presence    Education: Provided education on the importance of mobility in the acute care setting, Verbal/Tactile Cues, Transfer Training, Gait Training, and Post-Op Precautions    Assessment: Maurice Alvarado presents with functional mobility impairments which indicate the need for skilled intervention. Tolerating session today without incident. Pt able to negotiate flight of stairs with SBA using single handrail. Mild dizziness reported while ascending steps. All vital signs stable. Will continue to follow and progress as tolerated.     Plan/Recommendations:   If medically appropriate, No ongoing therapy recommended post-acute care. No therapy needs. Pt requires no DME at discharge.     Pt desires Home with family assist at discharge. Pt cooperative; agreeable to therapeutic recommendations and plan of care.         Basic Mobility 6-click:  Rollin = Total, A lot = 2, A little = 3; 4 = None  Supine>Sit:   1 = Total, A lot = 2, A little = 3; 4 = None   Sit>Stand with arms:  1 =  Total, A lot = 2, A little = 3; 4 = None  Bed>Chair:   1 = Total, A lot = 2, A little = 3; 4 = None  Ambulate in room:  1 = Total, A lot = 2, A little = 3; 4 = None  3-5 Steps with railin = Total, A lot = 2, A little = 3; 4 = None  Score: 24    Modified Leonardo: N/A = No pre-op stroke/TIA    Post-Tx Position: on commode with Alarms activated and Call light and personal items within reach  PPE: gloves

## 2024-05-21 NOTE — THERAPY TREATMENT NOTE
"Subjective: Pt agreeable to therapeutic plan of care. Pt did not understand term \" sternal precautions\", though upon cues and education pt verbalizes understanding and able to state 3/3 precautions. States \"I didn't know they were called sternal precautions.\"  Cognition: arousal/alertness: Alert and Attentive    Objective:     Bed Mobility: Min-A, pt requested to practice bed mobility for when he returns home. OT provided education and min assist for sit<>sup and sup<>sit. X2 reps this date.   Functional Transfers: CGA  Balance: sitting EOB and unsupported SBA  Functional Ambulation: CGA,for safety    Phase 1 Cardiac Rehab Initiated - Acute Care  Sitting tolerance: >10min and supported  Standing tolerance: 5-10min and supported    Precautions:  Mid-sternal incision; avoid scapular retraction and depression.  Cardiovascular impairment post-sx; encourage energy conservation strategies.    MET level equivalent: 1.4-2.0 (Self care ADLs in sitting / slow ambulation in room, light intensity activities)     Vitals: BP as follows:  Sittin/68 (85)  Standin/58 (85)  After Ambulation: 121/58 (79)  Sittin/71 (81)  End of session sitting (128/75 (92)    Pain: 1 VAS  Location: Sternum, expresses discomfort  Interventions for pain: Repositioned and Therapeutic Presence  Education: Provided education on the importance of mobility in the acute care setting, Verbal/Tactile Cues, ADL training, Transfer Training, WB'ing status, and Post-Op Precautions    Assessment: Maurice Alvarado presents with ADL impairments affecting function including endurance / activity tolerance and strength. Pt verbalizes 3/3 sternal precautions with min cues. Pt requested to practice bed mobility for when he returns home. OT provided education and min assist for sit<>sup and sup<>sit. X2 reps this date. Pt completed STS and ambulation with CGA for safety. Demonstrated functioning below baseline abilities indicate the need for continued " skilled intervention while inpatient. Tolerating session today without incident. Will continue to follow and progress as tolerated.     Plan/Recommendations:   No ongoing therapy recommended post-acute care. No therapy needs..    Pt desires Home with family assist at discharge. Pt cooperative; agreeable to therapeutic recommendations and plan of care.     Modified [place]: N/A = No pre-op stroke/TIA    Post-Tx Position: Up in Chair, Alarms activated, and Call light and personal items within reach  PPE: gloves

## 2024-05-21 NOTE — CASE MANAGEMENT/SOCIAL WORK
Continued Stay Note  CALLI Rangel     Patient Name: Maurice Alvarado  MRN: [id number]  Today's Date: 5/21/2024    Admit Date: 5/17/2024  Plan: DC plan: Return home w/ wife. CABG 5/17/2024.   Discharge Plan       Row Name 05/21/24 1111       Plan    Plan DC plan: Return home w/ wife. CABG 5/17/2024.    Patient/Family in Agreement with Plan yes    Provided Post Acute Provider List? N/A    Provided Post Acute Provider Quality & Resource List? N/A    Plan Comments CM spoke with patient's nurse and [facility] PA Kymberly Quintanilla to obtain clinical updates. DC barriers: POD 4, pacer wires, cardiac monitoring, monitor labs.             Expected Discharge Date and Time       Expected Discharge Date Expected Discharge Time    May 22, 2024        Caroline Johnston RN      Office Phone [phone]

## 2024-05-22 ENCOUNTER — READMISSION MANAGEMENT (OUTPATIENT)
Dept: CALL CENTER | Facility: HOSPITAL | Age: 84
End: 2024-05-22
Payer: MEDICARE

## 2024-05-22 VITALS
HEART RATE: 58 BPM | TEMPERATURE: 98 F | DIASTOLIC BLOOD PRESSURE: 61 MMHG | SYSTOLIC BLOOD PRESSURE: 121 MMHG | RESPIRATION RATE: 24 BRPM | OXYGEN SATURATION: 94 % | BODY MASS INDEX: 23.9 KG/M2 | WEIGHT: 180.34 LBS | HEIGHT: 73 IN

## 2024-05-22 LAB
ANION GAP SERPL CALCULATED.3IONS-SCNC: 9 MMOL/L (ref 5–15)
BUN SERPL-MCNC: 13 MG/DL (ref 8–23)
BUN/CREAT SERPL: 17.8 (ref 7–25)
CALCIUM SPEC-SCNC: 8.6 MG/DL (ref 8.6–10.5)
CHLORIDE SERPL-SCNC: 105 MMOL/L (ref 98–107)
CO2 SERPL-SCNC: 27 MMOL/L (ref 22–29)
CREAT SERPL-MCNC: 0.73 MG/DL (ref 0.76–1.27)
DEPRECATED RDW RBC AUTO: 43.2 FL (ref 37–54)
EGFRCR SERPLBLD CKD-EPI 2021: 91.4 ML/MIN/1.73
ERYTHROCYTE [DISTWIDTH] IN BLOOD BY AUTOMATED COUNT: 12.4 % (ref 12.3–15.4)
GLUCOSE BLDC GLUCOMTR-MCNC: 138 MG/DL (ref 70–105)
GLUCOSE SERPL-MCNC: 149 MG/DL (ref 65–99)
HCT VFR BLD AUTO: 31.9 % (ref 37.5–51)
HGB BLD-MCNC: 10.2 G/DL (ref 13–17.7)
MCH RBC QN AUTO: 30.2 PG (ref 26.6–33)
MCHC RBC AUTO-ENTMCNC: 32 G/DL (ref 31.5–35.7)
MCV RBC AUTO: 94.4 FL (ref 79–97)
PLATELET # BLD AUTO: 149 10*3/MM3 (ref 140–450)
PMV BLD AUTO: 9.4 FL (ref 6–12)
POTASSIUM SERPL-SCNC: 4 MMOL/L (ref 3.5–5.2)
RBC # BLD AUTO: 3.38 10*6/MM3 (ref 4.14–5.8)
SODIUM SERPL-SCNC: 141 MMOL/L (ref 136–145)
WBC NRBC COR # BLD AUTO: 7.62 10*3/MM3 (ref 3.4–10.8)

## 2024-05-22 PROCEDURE — 80048 BASIC METABOLIC PNL TOTAL CA: CPT | Performed by: NURSE PRACTITIONER

## 2024-05-22 PROCEDURE — 99232 SBSQ HOSP IP/OBS MODERATE 35: CPT | Performed by: INTERNAL MEDICINE

## 2024-05-22 PROCEDURE — 99024 POSTOP FOLLOW-UP VISIT: CPT | Performed by: THORACIC SURGERY (CARDIOTHORACIC VASCULAR SURGERY)

## 2024-05-22 PROCEDURE — 93005 ELECTROCARDIOGRAM TRACING: CPT

## 2024-05-22 PROCEDURE — 93010 ELECTROCARDIOGRAM REPORT: CPT | Performed by: INTERNAL MEDICINE

## 2024-05-22 PROCEDURE — 82948 REAGENT STRIP/BLOOD GLUCOSE: CPT

## 2024-05-22 PROCEDURE — 85027 COMPLETE CBC AUTOMATED: CPT | Performed by: NURSE PRACTITIONER

## 2024-05-22 RX ORDER — HYDROCODONE BITARTRATE AND ACETAMINOPHEN 5; 325 MG/1; MG/1
1 TABLET ORAL EVERY 6 HOURS PRN
Qty: 20 TABLET | Refills: 0 | Status: SHIPPED | OUTPATIENT
Start: 2024-05-22 | End: 2024-05-28

## 2024-05-22 RX ORDER — ASPIRIN 81 MG/1
81 TABLET ORAL DAILY
Start: 2024-05-22

## 2024-05-22 RX ORDER — AMIODARONE HYDROCHLORIDE 200 MG/1
200 TABLET ORAL
Qty: 30 TABLET | Refills: 1 | Status: SHIPPED | OUTPATIENT
Start: 2024-05-22

## 2024-05-22 RX ORDER — ATORVASTATIN CALCIUM 20 MG/1
20 TABLET, FILM COATED ORAL NIGHTLY
Qty: 90 TABLET | Refills: 1 | Status: SHIPPED | OUTPATIENT
Start: 2024-05-22

## 2024-05-22 RX ADMIN — AMIODARONE HYDROCHLORIDE 200 MG: 200 TABLET ORAL at 09:55

## 2024-05-22 RX ADMIN — PANTOPRAZOLE SODIUM 40 MG: 40 TABLET, DELAYED RELEASE ORAL at 05:56

## 2024-05-22 RX ADMIN — TAMSULOSIN HYDROCHLORIDE 0.4 MG: 0.4 CAPSULE ORAL at 09:55

## 2024-05-22 RX ADMIN — ASPIRIN 81 MG: 81 TABLET, COATED ORAL at 09:55

## 2024-05-22 RX ADMIN — HYDROCODONE BITARTRATE AND ACETAMINOPHEN 1 TABLET: 5; 325 TABLET ORAL at 00:27

## 2024-05-22 NOTE — OUTREACH NOTE
Prep Survey      Flowsheet Row Responses   [facility] discharged from? [facility]   Is LACE score < 7 ? No   Eligibility Readm Mgmt   Discharge diagnosis CORONARY ARTERY BYPASS WITH INTERNAL MAMMARY ARTERY GRAFT with intraop LEO   Does the patient have one of the following disease processes/diagnoses(primary or secondary)? Cardiothoracic surgery   Prep survey completed? Yes            Kanchan DE PAZ - Registered Nurse

## 2024-05-22 NOTE — PROGRESS NOTES
CARDIOLOGY PROGRESS NOTE:    Maurice Alvarado  83 y.o.  male  1940  [id number]      Referring Provider: Cardiac surgeon    Reason for follow-up: S/p cardiac surgery     Patient Care Team:  Alexandria Tubbs MD as PCP - Lenny Jain MD as Consulting Physician (Cardiology)    Subjective .  Patient doing well without any symptoms    Objective sitting in chair comfortably.  Also ambulating  Pulmonary cardiac     Review of Systems   Constitutional: Negative for fever and malaise/fatigue.   HENT:  Negative for ear pain and nosebleeds.    Eyes:  Negative for blurred vision and double vision.   Cardiovascular:  Negative for chest pain, dyspnea on exertion and palpitations.   Respiratory:  Negative for cough and shortness of breath.    Skin:  Negative for rash.   Musculoskeletal:  Negative for joint pain.   Gastrointestinal:  Negative for abdominal pain, nausea and vomiting.   Neurological:  Negative for focal weakness and headaches.   Psychiatric/Behavioral:  Negative for depression. The patient is not nervous/anxious.    All other systems reviewed and are negative.      Allergies: Patient has no known allergies.    Scheduled Meds:amiodarone, 200 mg, Oral, Q24H  aspirin, 81 mg, Oral, Daily  atorvastatin, 40 mg, Oral, Nightly  enoxaparin, 40 mg, Subcutaneous, Q24H  insulin lispro, 2-9 Units, Subcutaneous, 4x Daily AC & at Bedtime  mupirocin, , Each Nare, BID  pantoprazole, 40 mg, Oral, Q AM  tamsulosin, 0.4 mg, Oral, Daily      Continuous Infusions:niCARdipine, 5-15 mg/hr, Last Rate: Stopped (05/17/24 1333)      PRN Meds:.  acetaminophen **OR** acetaminophen **OR** acetaminophen    Calcium Replacement - Follow Nurse / BPA Driven Protocol    dextrose    dextrose    glucagon (human recombinant)    HYDROcodone-acetaminophen    Magnesium Cardiology Dose Replacement - Follow Nurse / BPA Driven Protocol    melatonin    meperidine    niCARdipine    nitroglycerin    ondansetron    Phosphorus Replacement - Follow  "Nurse / BPA Driven Protocol    polyethylene glycol    Potassium Replacement - Follow Nurse / BPA Driven Protocol    senna-docusate sodium        VITAL SIGNS  Vitals:    05/22/24 0500 05/22/24 0557 05/22/24 0600 05/22/24 0700   BP: 117/59  125/60 121/61   BP Location:    Left arm   Patient Position:    Sitting   Pulse: 56 60 58    Resp:    24   Temp:    98 °F (36.7 °C)   TempSrc:    Oral   SpO2: 93% 94% 94%    Weight:  81.8 kg (180 lb 5.4 oz)     Height:           Flowsheet Rows      Flowsheet Row First Filed Value   Admission Height 185.4 cm (73\") Documented at 05/17/2024 0600   Admission Weight 76.5 kg (168 lb 10.4 oz) Documented at 05/17/2024 0600             TELEMETRY: Sinus rhythm    Physical Exam:  Constitutional:       Appearance: Well-developed.   Eyes:      General: No scleral icterus.     Conjunctiva/sclera: Conjunctivae normal.      Pupils: Pupils are equal, round, and reactive to light.   HENT:      Head: Normocephalic and atraumatic.   Neck:      Vascular: No carotid bruit or JVD.   Pulmonary:      Effort: Pulmonary effort is normal.      Breath sounds: Normal breath sounds. No wheezing. No rales.   Cardiovascular:      Normal rate. Regular rhythm.   Pulses:     Intact distal pulses.   Abdominal:      General: Bowel sounds are normal.      Palpations: Abdomen is soft.   Musculoskeletal: Normal range of motion.      Cervical back: Normal range of motion and neck supple. Skin:     General: Skin is warm and dry.      Findings: No rash.   Neurological:      Mental Status: Alert.      Comments: No focal deficits          Results Review:   I reviewed the patient's new clinical results.  Lab Results (last 24 hours)       Procedure Component Value Units Date/Time    POC Glucose 4x Daily Before Meals & at Bedtime [[id number]]  (Abnormal) Collected: 05/22/24 0731    Specimen: Blood Updated: 05/22/24 0736     Glucose 138 mg/dL      Comment: Serial Number: [id number]:  [id number]       Basic Metabolic Panel " [[id number]]  (Abnormal) Collected: 05/22/24 0536    Specimen: Blood from Arm, Left Updated: 05/22/24 0611     Glucose 149 mg/dL      BUN 13 mg/dL      Creatinine 0.73 mg/dL      Sodium 141 mmol/L      Potassium 4.0 mmol/L      Chloride 105 mmol/L      CO2 27.0 mmol/L      Calcium 8.6 mg/dL      BUN/Creatinine Ratio 17.8     Anion Gap 9.0 mmol/L      eGFR 91.4 mL/min/1.73     Narrative:      GFR Normal >60  Chronic Kidney Disease <60  Kidney Failure <15    The GFR formula is only valid for adults with stable renal function between ages 18 and 70.    CBC (No Diff) [[id number]]  (Abnormal) Collected: 05/22/24 0536    Specimen: Blood from Arm, Left Updated: 05/22/24 0544     WBC 7.62 10*3/mm3      RBC 3.38 10*6/mm3      Hemoglobin 10.2 g/dL      Hematocrit 31.9 %      MCV 94.4 fL      MCH 30.2 pg      MCHC 32.0 g/dL      RDW 12.4 %      RDW-SD 43.2 fl      MPV 9.4 fL      Platelets 149 10*3/mm3     POC Glucose Once [[id number]]  (Abnormal) Collected: 05/21/24 2001    Specimen: Blood Updated: 05/21/24 2002     Glucose 277 mg/dL      Comment: Serial Number: [id number]:  [id number]       POC Glucose 4x Daily Before Meals & at Bedtime [[id number]]  (Abnormal) Collected: 05/21/24 1712    Specimen: Blood Updated: 05/21/24 1715     Glucose 190 mg/dL      Comment: Serial Number: [id number]:  [id number]       Potassium [[id number]]  (Normal) Collected: 05/21/24 1235    Specimen: Blood Updated: 05/21/24 1303     Potassium 4.3 mmol/L     POC Glucose Once [[id number]]  (Abnormal) Collected: 05/21/24 1216    Specimen: Blood Updated: 05/21/24 1219     Glucose 206 mg/dL      Comment: Serial Number: [id number]:  [id number]               Imaging Results (Last 24 Hours)       ** No results found for the last 24 hours. **            EKG      I personally viewed and interpreted the patient's EKG/Telemetry data:    ECHOCARDIOGRAM:  Results for orders placed in visit on 05/17/24    Intra-Op Anesthesia LEO    Narrative  Intra-Op  Anesthesia LEO    Procedure Performed: Intra-Op Anesthesia LEO  Start Time:  End Time:    Preanesthesia Checklist:  Patient identified, IV assessed, risks and benefits discussed, monitors and equipment assessed, procedure being performed at surgeon's request and anesthesia consent obtained.    General Procedure Information  Diagnostic Indications for Echo:  assessment of surgical repair and hemodynamic monitoring  Location performed:  OR  Intubated  Bite block placed  Heart visualized  Probe Insertion:  Easy  Probe Type:  Biplane and multiplane  Modalities:  Color flow mapping, pulse wave Doppler and continuous wave Doppler    Echocardiographic and Doppler Measurements    Ventricles    Right Ventricle:  Cavity size normal.  Hypertrophy not present.  Thrombus not present.  Global function normal.  Ejection Fraction 50%.  Left Ventricle:  Cavity size normal.  Thrombus not present.  Global Function normal.  Ejection Fraction 60%.    Ventricular Regional Function:  1- Basal Anteroseptal:  normal  2- Basal Anterior:  normal  3- Basal Anterolateral:  normal  4- Basal Inferolateral:  normal  5- Basal Inferior:  normal  6- Basal Inferoseptal:  normal  7- Mid Anteroseptal:  normal  8- Mid Anterior:  normal  9- Mid Anterolateral:  normal  10- Mid Inferolateral:  normal  11- Mid Inferior:  normal  12- Mid Inferoseptal:  normal  13- Apical Anterior:  normal  14- Apical Lateral:  normal  15- Apical Inferior:  normal  16- Apical Septal:  normal  17- [place]:  normal      Valves    Aortic Valve:  Annulus normal.  Stenosis not present.  Regurgitation absent.  Leaflets normal.  Leaflet motions normal.    Mitral Valve:  Annulus normal.  Stenosis not present.  Regurgitation severe.  Leaflets normal.  Leaflet motions prolapsed.  Specific leaflet segments with abnormal motions are described in the following comments:  P2    Tricuspid Valve:  Annulus normal.  Stenosis not present.  Regurgitation absent.  Leaflets normal.  Leaflet motions  normal.  Pulmonic Valve:  Annulus normal.  Stenosis not present.  Regurgitation absent.      Aorta    Ascending Aorta:  Size normal.  Dissection not present.  Plaque thickness less than 3 mm.  Mobile plaque not present.  Aortic Arch:  Size normal.  Dissection not present.  Plaque thickness less than 3 mm.  Mobile plaque not present.  Descending Aorta:  Size normal.  Dissection not present.  Plaque thickness less than 3 mm.  Mobile plaque not present.      Atria    Right Atrium:  Size normal.  Left Atrium:  Size normal.  Spontaneous echo contrast not present.  Thrombus not present.  Tumor not present.  Device not present.  Left atrial appendage normal.      Septa        Ventricular Septum:  Intra-ventricular septum morphology normal.        Other Findings  Pericardium:  normal  Pleural Effusion:  none  Pulmonary Arteries:  normal      Anesthesia Information  Performed Personally  Anesthesiologist:  Wilber Del Valle MD      Echocardiogram Comments:  LEO placed easily no resistance, lubricated, bite guard      Pre cpb LEO  LV no wma EF 60  RV low nl SF  MV P2>P3 prolapse with ant and mainly posteriorly directed jet , mod sev MR +coanda  AV TV wnl      S/p cabg x 1 MV ring  LV no wma EF 60+  RV nl SF  TV mild mod TR  AV wnl  MV ring/ repair no MR       STRESS MYOVIEW:       CARDIAC CATHETERIZATION:  Results for orders placed during the hospital encounter of 05/08/24    Cardiac Catheterization/Vascular Study       OTHER:         Assessment & Plan     #1 mitral valve disease  Patient had severe mitral regurgitation by echo and possible echocardiogram and hence underwent mitral valve repair and is currently stable.  Patient is intubated and sedation has been weaned off  Patient has chest tube pressure draining very well  Patient's urine output is good  Blood pressure and heart rate are stable.  Patient is extubated and his chest tubes are removed and is tolerating oral medicines very well.    2.  Coronary disease  Patient  had single-vessel coronary disease underwent a coronary bypass surgery x 1 vessel and is currently stable.  Patient is ambulating very well and tolerating medications.    3.  Hypertension  Patient blood pressure is currently stable and is not requiring any medications especially beta-blockers because of low heart rates.    4.  Diabetes  Patient was on metformin which is currently on sliding scale for now and will be restarted on home medicines and is extubated    5.  Hyperlipidemia  Patient is on statin.    6.  Postop atrial fibrillation  Patient is postop atrial fibrillation is on IV amiodarone and is in sinus rhythm and I will stop the amiodarone IV and place him on oral amiodarone but no beta-blockers at this time.  Patient is doing well with oral amiodarone once a day and will be discharged on the same medicine.  Patient does not have any more recurrence of A-fib.  Patient is also ambulating very well and is being discharged to home.  Will follow him in the office for        I discussed the patients findings and my recommendations with patient's nurse  Lenny Gilman MD  05/22/24  10:54 EDT

## 2024-05-22 NOTE — CASE MANAGEMENT/SOCIAL WORK
Case Management Discharge Note      Final Note: Routine home.    Provided Post Acute Provider List?: N/A  Provided Post Acute Provider Quality & Resource List?: N/A    Selected Continued Care - Admitted Since 5/17/2024      Transportation Services  Private: Car    Final Discharge Disposition Code: 01 - home or self-care

## 2024-05-22 NOTE — PROGRESS NOTES
S/P POD# 5 complex mitral valve repair/ CABG x1 with LIMA--Cherrie  EF 60-65% (LEO)    Subjective: No complaints today, ready for DC home    No events overnight  Wt is up 4 kgs from preop      Intake/Output Summary (Last 24 hours) at 5/22/2024 0840  Last data filed at 5/22/2024 0600  Gross per 24 hour   Intake 934 ml   Output 2050 ml   Net -1116 ml     Temp:  [97.1 °F (36.2 °C)-98.7 °F (37.1 °C)] 98 °F (36.7 °C)  Heart Rate:  [56-70] 58  Resp:  [18-24] 24  BP: (105-151)/(58-73) 121/61      Results from last 7 days   Lab Units 05/22/24  0536 05/21/24  0531 05/19/24  0435 05/18/24  0307 05/17/24  1324 05/17/24  1221 05/17/24  1036 05/17/24  1029   WBC 10*3/mm3 7.62 7.59   < > 9.63  --  10.29  --  8.61   HEMOGLOBIN g/dL 10.2* 10.2*   < > 9.5*  --  11.1*  --  9.6*   HEMOGLOBIN, POC   --   --   --   --    < >  --    < >  --    HEMATOCRIT % 31.9* 31.4*   < > 28.9*  --  33.2*  --  29.1*   HEMATOCRIT POC   --   --   --   --    < >  --    < >  --    PLATELETS 10*3/mm3 149 126*   < > 96*  --  129*  --  137*   INR   --   --   --  1.03  --  1.06  --  1.40*    < > = values in this interval not displayed.     Results from last 7 days   Lab Units 05/22/24 0536 05/19/24 0435 05/18/24  0307   CREATININE mg/dL 0.73*   < > 0.82   POTASSIUM mmol/L 4.0   < > 4.2   SODIUM mmol/L 141   < > 141   MAGNESIUM mg/dL  --   --  2.9*   PHOSPHORUS mg/dL  --   --  3.8    < > = values in this interval not displayed.       Physical Exam:  Neuro intact, nad, up in recliner, no family present  Tele: SB 50s  Diminished bases, 96% RA  Sternotomy healing well  Benign abd, + BM  No edema    Assessment/Plan:  Principal Problem:    CAD (coronary artery disease)  Active Problems:    Mitral valve disease    S/P mitral valve repair per Dr. Grier 5/17/2024    S/P CABG x 1 with KEITA per Dr. Grier 5/17/2024    Postoperative atrial fibrillation    - Severe mitral regurgitation/single-vessel CAD, EF 60-65% (LEO)--s/p complex mitral valve repair (36 mm Medtronic  flexible band posterior annuloplasty) and chordal repair of P2 segment and cleftoplasty of P2-P3/CABG x 1 with LIMA to LAD (Pagni)  - HTN--stable  - HLD--statin  - DM type II--A1c 7  - Postop ABLA, expected  - Postop TCP, consumptive  - Postop atrial fibrillation--converted with amio, SR now    POD# 5.  Maintaining sinus.  Per Dr. Gilman, no bb, only amio 200 daily.  Ready for DC home.  He will need an echo in 2 months to evaluate mitral valve repair.    [id number] Score for Atrial Fibrillation Stroke Risk on 5/20/2024  RESULT SUMMARY:  4 points  Stroke risk was 4.8% per year in >90,000 patients (the [place] Atrial Fibrillation Cohort Study) and 6.7% risk of stroke/TIA/systemic embolism.    Routine care--as above  D/w pt/nsg, Dr. Gilman  DC home    Selene Bryan, APRN  5/22/2024  08:40 EDT

## 2024-05-23 LAB
QT INTERVAL: 312 MS
QT INTERVAL: 452 MS
QTC INTERVAL: 440 MS
QTC INTERVAL: 452 MS

## 2024-05-24 ENCOUNTER — TELEPHONE (OUTPATIENT)
Dept: CARDIAC SURGERY | Facility: CLINIC | Age: 84
End: 2024-05-24
Payer: MEDICARE

## 2024-05-24 NOTE — TELEPHONE ENCOUNTER
"  Caller: Maurice Alvarado \"[employer]\"    Relationship: Self    Best call back number: [phone]    What is the best time to reach you: ANY    Who are you requesting to speak with (clinical staff, provider,  specific staff member): CLINICAL-WELLBORN    Do you know the name of the person who called: NA    What was the call regarding: NON URGERNT POST OP & FOLLOW UP QUESTIONS FROM SURGERY.    Is it okay if the provider responds through MyChart: NA  "

## 2024-05-24 NOTE — TELEPHONE ENCOUNTER
Spoke with , discussed post op appt date,time,location. Appt scheduled at [facility] on 6/12/24@2247

## 2024-05-26 LAB
QT INTERVAL: 411 MS
QTC INTERVAL: 419 MS

## 2024-05-29 ENCOUNTER — READMISSION MANAGEMENT (OUTPATIENT)
Dept: CALL CENTER | Facility: HOSPITAL | Age: 84
End: 2024-05-29
Payer: MEDICARE

## 2024-05-29 NOTE — OUTREACH NOTE
CT Surgery Week 1 Survey      Flowsheet Row Responses   [facility] [place] patient discharged from? [facility]   Does the patient have one of the following disease processes/diagnoses(primary or secondary)? Cardiothoracic surgery   Week 1 attempt successful? Yes   Call start time 1240   Call end time 1251   Discharge diagnosis CORONARY ARTERY BYPASS WITH INTERNAL MAMMARY ARTERY GRAFT with intraop LEO   Meds reviewed with patient/caregiver? Yes   Is the patient having any side effects they believe may be caused by any medication additions or changes? No   Does the patient have all medications related to this admission filled (includes all antibiotics, pain medications, cardiac medications, etc.) Yes   Is the patient taking all medications as directed (includes completed medication regime)? Yes   Does the patient have a primary care provider?  Yes   Does the patient have an appointment scheduled with their C/T surgeon? Yes   Has the patient kept scheduled appointments due by today? N/A   Has home health visited the patient within 72 hours of discharge? N/A   Psychosocial issues? No   Did the patient receive a copy of their discharge instructions? Yes   Nursing interventions Reviewed instructions with patient   What is the patient's perception of their health status since discharge? Improving   Nursing interventions Nurse provided patient education   Is the patient/caregiver able to teach back normal signs of recovery? Pain or discomfort at incisional site   Nursing interventions Reassured on normal signs of recovery   Is the patient /caregiver able to teach back basic post-op care? Continue use of incentive spirometry at least 1 week post discharge, Practice cough and deep breath every 4 hours while awake, Hold pillow to support chest when coughing, Drive as instructed by MD in discharge instructions, Shower daily, No tub bath, swimming, or hot tub until instructed by MD, Use a clean wash cloth and antibacterial bar or  liquid soap to clean incisions, If the steri-strips are falling off, it is okay to remove them. (If applicable), Lifting as instructed by MD in discharge instructions   Is the patient/caregiver able to teach back signs and symptoms of incisional infection? Increased redness, swelling or pain at the incisonal site, Increased drainage or bleeding, Incisional warmth, Pus or odor from incision, Fever   Is the patient/caregiver able to teach back steps to recovery at home? Set small, achievable goals for return to baseline health, Rest and rebuild strength, gradually increase activity, Eat a well-balance diet   Is the patient /caregiver able to teach back the importance of cardiac rehab? Yes   If the patient is a current smoker, are they able to teach back resources for cessation? Not a smoker   Is the patient/caregiver able to teach back the hierarchy of who to call/visit for symptoms/problems? PCP, Specialist, Home health nurse, Urgent Care, ED, 911 Yes   Additional teach back comments states constipation, has improved with stool softeners, Metamucil   Week 1 call completed? Yes   Call end time 1251              Darling Edwards Registered Nurse

## 2024-05-30 LAB
QT INTERVAL: 450 MS
QTC INTERVAL: 443 MS

## 2024-06-06 ENCOUNTER — TELEPHONE (OUTPATIENT)
Dept: CARDIAC REHAB | Facility: HOSPITAL | Age: 84
End: 2024-06-06
Payer: MEDICARE

## 2024-06-06 ENCOUNTER — TRANSCRIBE ORDERS (OUTPATIENT)
Dept: CARDIAC REHAB | Facility: HOSPITAL | Age: 84
End: 2024-06-06
Payer: MEDICARE

## 2024-06-06 DIAGNOSIS — Z95.1 S/P CABG (CORONARY ARTERY BYPASS GRAFT): Primary | ICD-10-CM

## 2024-06-06 DIAGNOSIS — Z98.890 S/P MVR (MITRAL VALVE REPAIR): ICD-10-CM

## 2024-06-12 ENCOUNTER — OFFICE VISIT (OUTPATIENT)
Dept: CARDIAC SURGERY | Facility: CLINIC | Age: 84
End: 2024-06-12
Payer: MEDICARE

## 2024-06-12 VITALS
SYSTOLIC BLOOD PRESSURE: 122 MMHG | TEMPERATURE: 97.8 F | WEIGHT: 167 LBS | RESPIRATION RATE: 16 BRPM | HEIGHT: 73 IN | DIASTOLIC BLOOD PRESSURE: 62 MMHG | HEART RATE: 51 BPM | BODY MASS INDEX: 22.13 KG/M2 | OXYGEN SATURATION: 97 %

## 2024-06-12 DIAGNOSIS — Z98.890 S/P MVR (MITRAL VALVE REPAIR): Primary | ICD-10-CM

## 2024-06-12 NOTE — PROGRESS NOTES
"CARDIOVASCULAR SURGERY FOLLOW-UP PROGRESS NOTE  Chief Complaint: Post-op follow-up appointment     HPI:   Dear Dr. Gilman and Colleagues:    It was my pleasure to see your patient Maurice Alvarado in the office today.  As you know, he is a 83 y.o. male with mitral regurgitation and coronary artery disease who underwent mitral valve repair and CABG X1 (KEITA to LAD) by Dr. Grier on 5/17/2024. He did well postoperatively and was discharged shortly after surgery. He comes in today complaining of nothing.  His activity level has been good.  His rhythm is stable since discharge.  Borderline bradycardia, thus not on a beta-blocker.  Only on 200 mg Amio daily per cardiology.    Physical Exam:         /62 (BP Location: Left arm, Patient Position: Sitting, Cuff Size: Adult)   Pulse 51   Temp 97.8 °F (36.6 °C) (Temporal)   Resp 16   Ht 185.4 cm (73\")   Wt 75.8 kg (167 lb)   SpO2 97%   BMI 22.03 kg/m²   Heart:  regular rhythm, HR 50-60s  Lungs:  clear to auscultation bilaterally  Extremities:  no edema  Incision(s):  sternum stable; c/d/i    Assessment/Plan:     S/P CABG and mitral valve repair. Overall, he is doing well.    No significant post-op complications  Keep incisions clean and dry  No heavy lifting greater than 20 pounds for another 2 months.  OK to drive if not taking narcotic pain medicine  OK to begin cardiac rehab  Follow-up as scheduled with cardiology  Follow-up as scheduled with PCP  He has a postop 2D echo scheduled next month.    Follow-up with our office PRN.      Thank you for allowing me to participate in the care of your patient. If you have any questions or concerns feel free to contact myself or Dr. Grier.    Regards,  Ryan Johnson PA-C    "

## 2024-06-13 ENCOUNTER — TELEPHONE (OUTPATIENT)
Dept: CARDIAC REHAB | Facility: HOSPITAL | Age: 84
End: 2024-06-13
Payer: MEDICARE

## 2024-06-17 ENCOUNTER — TELEPHONE (OUTPATIENT)
Dept: CARDIOLOGY | Facility: CLINIC | Age: 84
End: 2024-06-17

## 2024-06-17 ENCOUNTER — TELEPHONE (OUTPATIENT)
Dept: CARDIAC SURGERY | Facility: CLINIC | Age: 84
End: 2024-06-17
Payer: MEDICARE

## 2024-06-17 ENCOUNTER — OFFICE VISIT (OUTPATIENT)
Dept: CARDIOLOGY | Facility: CLINIC | Age: 84
End: 2024-06-17
Payer: MEDICARE

## 2024-06-17 VITALS
OXYGEN SATURATION: 97 % | WEIGHT: 169 LBS | SYSTOLIC BLOOD PRESSURE: 121 MMHG | HEART RATE: 97 BPM | HEIGHT: 73 IN | BODY MASS INDEX: 22.4 KG/M2 | DIASTOLIC BLOOD PRESSURE: 76 MMHG

## 2024-06-17 DIAGNOSIS — E78.00 PURE HYPERCHOLESTEROLEMIA: ICD-10-CM

## 2024-06-17 DIAGNOSIS — I05.9 MITRAL VALVE DISEASE: ICD-10-CM

## 2024-06-17 DIAGNOSIS — I25.10 CORONARY ARTERY DISEASE INVOLVING NATIVE CORONARY ARTERY OF NATIVE HEART WITHOUT ANGINA PECTORIS: Primary | ICD-10-CM

## 2024-06-17 DIAGNOSIS — I10 ESSENTIAL HYPERTENSION: ICD-10-CM

## 2024-06-17 DIAGNOSIS — E11.9 TYPE 2 DIABETES MELLITUS WITHOUT COMPLICATION, WITHOUT LONG-TERM CURRENT USE OF INSULIN: ICD-10-CM

## 2024-06-17 PROCEDURE — 1159F MED LIST DOCD IN RCRD: CPT | Performed by: INTERNAL MEDICINE

## 2024-06-17 PROCEDURE — 99214 OFFICE O/P EST MOD 30 MIN: CPT | Performed by: INTERNAL MEDICINE

## 2024-06-17 PROCEDURE — 3074F SYST BP LT 130 MM HG: CPT | Performed by: INTERNAL MEDICINE

## 2024-06-17 PROCEDURE — 3078F DIAST BP <80 MM HG: CPT | Performed by: INTERNAL MEDICINE

## 2024-06-17 PROCEDURE — 1160F RVW MEDS BY RX/DR IN RCRD: CPT | Performed by: INTERNAL MEDICINE

## 2024-06-17 RX ORDER — AMIODARONE HYDROCHLORIDE 200 MG/1
200 TABLET ORAL
Qty: 90 TABLET | Refills: 3 | Status: SHIPPED | OUTPATIENT
Start: 2024-06-17

## 2024-06-17 NOTE — TELEPHONE ENCOUNTER
Notified patient that DAKOTAH Morton, spoke to Dr. Gilman's APRN and their office will call him with further recommendations tomorrow. Advised patient to hold off on taking lisinopril at this time. Patient verbalized understanding.

## 2024-06-17 NOTE — TELEPHONE ENCOUNTER
Patient called inquiring about resuming his Lisinopril. I informed patient that he was PRN with our office as of his last appointment. I told him he should call his Cardiologist regarding any medication changes. Patient stated that he seen Dr. Gilman this morning and called his office about the Lisinopril but was told to call Dr. Grier's office. RN picked up and spoke to patient and advised him that our APRN was reaching out to Dr. Gilman and one of our offices would give him a callback. Patient verbalized understanding.

## 2024-06-17 NOTE — PROGRESS NOTES
Subjective:     Encounter Date:06/17/2024      Patient ID: Maurice Alvarado is a 83 y.o. male.    Chief Complaint:  History of Present Illness 83-year-old white male with history of coronary disease mitral valve disease diabetes hypertension hyperlipidemia presents to the office for a follow-up.  Patient is currently still without isms of chest pain but has some shortness of breath with exertion.  No complaint any PND orthopnea.  No palpitation but has some dizziness.  No syncope or swelling of the feet.  Is taking medicine regular but he does not smoke.    The following portions of the patient's history were reviewed and updated as appropriate: allergies, current medications, past family history, past medical history, past social history, past surgical history, and problem list.  Past Medical History:   Diagnosis Date    CAD (coronary artery disease) 5/8/2024    Diabetes mellitus     Heart murmur     Heart valve disease     Hypertension     Postoperative atrial fibrillation 5/21/2024     Past Surgical History:   Procedure Laterality Date    CARDIAC CATHETERIZATION N/A 5/8/2024    Procedure: Left Heart Cath with angiogram;  Surgeon: Lenny Gilman MD;  Location: [facility] CATH INVASIVE LOCATION;  Service: Cardiovascular;  Laterality: N/A;    CARDIAC CATHETERIZATION N/A 5/8/2024    Procedure: Left ventriculography;  Surgeon: Lenny Gilman MD;  Location: [facility] CATH INVASIVE LOCATION;  Service: Cardiovascular;  Laterality: N/A;    CORONARY ARTERY BYPASS GRAFT N/A 5/17/2024    Procedure: CORONARY ARTERY BYPASS WITH INTERNAL MAMMARY ARTERY GRAFT with intraop LEO;  Surgeon: Sean Grier MD;  Location: [facility];  Service: Cardiothoracic;  Laterality: N/A;  CABG X 1 (1 LIMA graft)    GALLBLADDER SURGERY  2002    MITRAL VALVE REPAIR/REPLACEMENT N/A 5/17/2024    Procedure: MITRAL VALVE REPAIR/REPLACEMENT;  Surgeon: Sean Grier MD;  Location: [facility] CVOR;  Service: Cardiothoracic;  Laterality: N/A;  Mitral  "Valve repair with 36mm SimuPlus Band. with left atrial appendage closure.     /76   Pulse 97   Ht 185.4 cm (72.99\")   Wt 76.7 kg (169 lb)   SpO2 97%   BMI 22.30 kg/m²   Family History   Problem Relation Age of Onset    No Known Problems Mother     Heart attack Father     No Known Problems Sister     No Known Problems Brother     No Known Problems Maternal Aunt     No Known Problems Maternal Uncle     No Known Problems Paternal Aunt     No Known Problems Paternal Uncle     No Known Problems Maternal Grandmother     No Known Problems Maternal Grandfather     No Known Problems Paternal Grandmother     No Known Problems Paternal Grandfather     No Known Problems Other     Anemia Neg Hx     Arrhythmia Neg Hx     Asthma Neg Hx     Clotting disorder Neg Hx     Fainting Neg Hx     Heart disease Neg Hx     Heart failure Neg Hx     Hyperlipidemia Neg Hx     Hypertension Neg Hx        Current Outpatient Medications:     amiodarone (PACERONE) 200 MG tablet, Take 1 tablet by mouth Daily., Disp: 90 tablet, Rfl: 3    aspirin 81 MG EC tablet, Take 1 tablet by mouth Daily., Disp: , Rfl:     atorvastatin (LIPITOR) 20 MG tablet, Take 1 tablet by mouth Every Night., Disp: 90 tablet, Rfl: 1    B Complex-Biotin-FA (SUPER B-COMPLEX) capsule, Take 1 capsule by mouth Daily., Disp: , Rfl:     Coenzyme Q10 (COQ-10) 200 MG capsule, Take 1 tablet by mouth Daily., Disp: , Rfl:     Flaxseed, Linseed, (FLAXSEED OIL) oil, Take 1 capsule by mouth Daily., Disp: , Rfl:     metFORMIN (GLUCOPHAGE) 500 MG tablet, Take 1 tablet by mouth 2 (Two) Times a Day With Meals., Disp: , Rfl:     Multiple Vitamins-Minerals (EQ COMPLETE MULTIVITAMIN-ADULT) tablet, Take 1 tablet by mouth Daily., Disp: , Rfl:     tamsulosin (FLOMAX) 0.4 MG capsule 24 hr capsule, Take 1 capsule by mouth 2 (Two) Times a Day., Disp: , Rfl:     vitamin C (ASCORBIC ACID) 500 MG tablet, Take 1 tablet by mouth Daily., Disp: , Rfl:   No Known Allergies  Social History "     Socioeconomic History    Marital status:    Tobacco Use    Smoking status: Never     Passive exposure: Never    Smokeless tobacco: Never   Vaping Use    Vaping status: Never Used   Substance and Sexual Activity    Alcohol use: Yes     Alcohol/week: 2.0 standard drinks of alcohol     Types: 1 Glasses of wine, 1 Cans of beer per week     Comment: occ     Drug use: Never    Sexual activity: Not Currently     Partners: Female     Review of Systems   Constitutional: Positive for malaise/fatigue.   Cardiovascular:  Negative for chest pain, dyspnea on exertion, leg swelling and palpitations.   Respiratory:  Positive for shortness of breath. Negative for cough.    Gastrointestinal:  Negative for abdominal pain, nausea and vomiting.   Neurological:  Positive for dizziness and light-headedness. Negative for focal weakness, headaches and numbness.   All other systems reviewed and are negative.             Objective:     Constitutional:       Appearance: Well-developed.   Eyes:      General: No scleral icterus.     Conjunctiva/sclera: Conjunctivae normal.   HENT:      Head: Normocephalic and atraumatic.   Neck:      Vascular: No carotid bruit or JVD.   Pulmonary:      Effort: Pulmonary effort is normal.      Breath sounds: Normal breath sounds. No wheezing. No rales.   Cardiovascular:      Normal rate. Regular rhythm.   Pulses:     Intact distal pulses.   Abdominal:      General: Bowel sounds are normal.      Palpations: Abdomen is soft.   Musculoskeletal:      Cervical back: Normal range of motion and neck supple. Skin:     General: Skin is warm and dry.      Findings: No rash.   Neurological:      Mental Status: Alert.       Procedures    Lab Review:         MDM    #1 coronary disease  Patient had a LIMA to the LAD and is currently stable on medications with normal LV function  2.  Mitral valve disease  Patient had severe mitral regurgitation and is status post repair along with left atrial appendage  ligation  Patient has normal LV function is currently stable  2.  Diabetes  He is on oral medicines and followed by primary care doctor  3.  Hyperlipidemia  Patient on atorvastatin and the lipid levels are well within normal limits    Patient's previous medical records, labs, and EKG were reviewed and discussed with the patient at today's visit.

## 2024-06-17 NOTE — TELEPHONE ENCOUNTER
Called patient, he stated that Dr. Grier stopped his Lisinopril before open heart he will contact them to see if it is ok to restart medications.

## 2024-06-18 ENCOUNTER — TELEPHONE (OUTPATIENT)
Dept: CARDIOLOGY | Facility: CLINIC | Age: 84
End: 2024-06-18
Payer: MEDICARE

## 2024-07-09 ENCOUNTER — TELEPHONE (OUTPATIENT)
Dept: CARDIOLOGY | Facility: CLINIC | Age: 84
End: 2024-07-09
Payer: MEDICARE

## 2024-07-09 ENCOUNTER — DOCUMENTATION (OUTPATIENT)
Dept: CARDIAC REHAB | Facility: HOSPITAL | Age: 84
End: 2024-07-09
Payer: MEDICARE

## 2024-07-09 NOTE — TELEPHONE ENCOUNTER
Raina from cardiac rehab called into the office notifying that the patient is declining to do cardiac rehab.    Documentation with Raina Lewis RN (07/09/2024)

## 2024-07-09 NOTE — PROGRESS NOTES
"Mr Alvarado came in for phase 2 appt but came to rehab first and states he feels he does enough at home and feels \"great\". Discussed reasons  cardiac rehab is important to recovery but he states he does not want to do. I will notify Dr Howell office.  "

## 2024-07-17 ENCOUNTER — HOSPITAL ENCOUNTER (OUTPATIENT)
Dept: CARDIOLOGY | Facility: HOSPITAL | Age: 84
Discharge: HOME | End: 2024-07-17
Admitting: NURSE PRACTITIONER
Payer: MEDICARE

## 2024-07-17 VITALS
HEIGHT: 72 IN | DIASTOLIC BLOOD PRESSURE: 76 MMHG | SYSTOLIC BLOOD PRESSURE: 121 MMHG | WEIGHT: 169 LBS | BODY MASS INDEX: 22.89 KG/M2

## 2024-07-17 DIAGNOSIS — Z98.890 S/P MVR (MITRAL VALVE REPAIR): ICD-10-CM

## 2024-07-17 LAB
BH CV ECHO LEFT VENTRICLE GLOBAL LONGITUDINAL STRAIN: 17.1 %
BH CV ECHO MEAS - ACS: 1.74 CM
BH CV ECHO MEAS - AO MAX PG: 10.7 MMHG
BH CV ECHO MEAS - AO MEAN PG: 5.8 MMHG
BH CV ECHO MEAS - AO ROOT DIAM: 3.2 CM
BH CV ECHO MEAS - AO V2 MAX: 163.7 CM/SEC
BH CV ECHO MEAS - AO V2 VTI: 31 CM
BH CV ECHO MEAS - AVA(I,D): 1.76 CM2
BH CV ECHO MEAS - EDV(CUBED): 138.7 ML
BH CV ECHO MEAS - EDV(MOD-SP4): 65.5 ML
BH CV ECHO MEAS - EF(MOD-BP): 61 %
BH CV ECHO MEAS - EF(MOD-SP4): 60.5 %
BH CV ECHO MEAS - ESV(CUBED): 37.6 ML
BH CV ECHO MEAS - ESV(MOD-SP4): 25.8 ML
BH CV ECHO MEAS - FS: 35.3 %
BH CV ECHO MEAS - IVS/LVPW: 0.99 CM
BH CV ECHO MEAS - IVSD: 1.06 CM
BH CV ECHO MEAS - LA DIMENSION: 4.1 CM
BH CV ECHO MEAS - LV MASS(C)D: 210.4 GRAMS
BH CV ECHO MEAS - LV MAX PG: 2.9 MMHG
BH CV ECHO MEAS - LV MEAN PG: 1.73 MMHG
BH CV ECHO MEAS - LV V1 MAX: 85.2 CM/SEC
BH CV ECHO MEAS - LV V1 VTI: 16.4 CM
BH CV ECHO MEAS - LVIDD: 5.2 CM
BH CV ECHO MEAS - LVIDS: 3.4 CM
BH CV ECHO MEAS - LVOT AREA: 3.3 CM2
BH CV ECHO MEAS - LVOT DIAM: 2.06 CM
BH CV ECHO MEAS - LVPWD: 1.07 CM
BH CV ECHO MEAS - MR MAX PG: 27.6 MMHG
BH CV ECHO MEAS - MR MAX VEL: 262.5 CM/SEC
BH CV ECHO MEAS - MV A MAX VEL: 104.8 CM/SEC
BH CV ECHO MEAS - MV DEC SLOPE: 394.2 CM/SEC2
BH CV ECHO MEAS - MV DEC TIME: 0.24 SEC
BH CV ECHO MEAS - MV E MAX VEL: 92.8 CM/SEC
BH CV ECHO MEAS - MV E/A: 0.88
BH CV ECHO MEAS - MV MAX PG: 6.3 MMHG
BH CV ECHO MEAS - MV MEAN PG: 3.3 MMHG
BH CV ECHO MEAS - MV V2 VTI: 37.3 CM
BH CV ECHO MEAS - MVA(VTI): 1.46 CM2
BH CV ECHO MEAS - PA ACC TIME: 0.07 SEC
BH CV ECHO MEAS - PA V2 MAX: 116.3 CM/SEC
BH CV ECHO MEAS - RAP SYSTOLE: 3 MMHG
BH CV ECHO MEAS - RV MAX PG: 2.7 MMHG
BH CV ECHO MEAS - RV V1 MAX: 82.3 CM/SEC
BH CV ECHO MEAS - RV V1 VTI: 13.7 CM
BH CV ECHO MEAS - RVDD: 2.8 CM
BH CV ECHO MEAS - RVSP: 24.6 MMHG
BH CV ECHO MEAS - SV(LVOT): 54.6 ML
BH CV ECHO MEAS - SV(MOD-SP4): 39.7 ML
BH CV ECHO MEAS - TR MAX PG: 21.6 MMHG
BH CV ECHO MEAS - TR MAX VEL: 232.2 CM/SEC

## 2024-07-17 PROCEDURE — 93306 TTE W/DOPPLER COMPLETE: CPT

## 2024-07-17 PROCEDURE — 93356 MYOCRD STRAIN IMG SPCKL TRCK: CPT

## 2024-07-18 ENCOUNTER — TELEPHONE (OUTPATIENT)
Dept: CARDIAC SURGERY | Facility: CLINIC | Age: 84
End: 2024-07-18
Payer: MEDICARE

## 2024-07-18 NOTE — TELEPHONE ENCOUNTER
Notified patient that Dr. Grier had reviewed his echo and does not see any concerning abnormalities.

## 2024-12-17 ENCOUNTER — OFFICE VISIT (OUTPATIENT)
Dept: CARDIOLOGY | Facility: CLINIC | Age: 84
End: 2024-12-17
Payer: MEDICARE

## 2024-12-17 VITALS
HEART RATE: 72 BPM | DIASTOLIC BLOOD PRESSURE: 73 MMHG | SYSTOLIC BLOOD PRESSURE: 106 MMHG | BODY MASS INDEX: 23.84 KG/M2 | HEIGHT: 72 IN | WEIGHT: 176 LBS | OXYGEN SATURATION: 97 %

## 2024-12-17 DIAGNOSIS — E11.9 TYPE 2 DIABETES MELLITUS WITHOUT COMPLICATION, WITHOUT LONG-TERM CURRENT USE OF INSULIN: ICD-10-CM

## 2024-12-17 DIAGNOSIS — I05.9 MITRAL VALVE DISEASE: ICD-10-CM

## 2024-12-17 DIAGNOSIS — I10 ESSENTIAL HYPERTENSION: ICD-10-CM

## 2024-12-17 DIAGNOSIS — I25.10 CORONARY ARTERY DISEASE INVOLVING NATIVE CORONARY ARTERY OF NATIVE HEART WITHOUT ANGINA PECTORIS: ICD-10-CM

## 2024-12-17 DIAGNOSIS — Z98.890 S/P MVR (MITRAL VALVE REPAIR): Primary | ICD-10-CM

## 2024-12-17 DIAGNOSIS — E78.00 PURE HYPERCHOLESTEROLEMIA: ICD-10-CM

## 2024-12-17 NOTE — PROGRESS NOTES
Subjective:     Encounter Date:12/17/2024      Patient ID: Maurice Alvarado is a 84 y.o. male.    Chief Complaint:  Coronary Artery Disease  Symptoms include dizziness. Pertinent negatives include no chest pain, leg swelling, palpitations or shortness of breath.     84-year-old white male with history of mitral valve disease status post mitral valve repair coronary disease hypertension hyperlipidemia and diabetes presents to my office for a follow-up.  Patient is currently stable without any symptoms of chest pain or shortness of breath at rest or exertion.  No complaint of any PND or orthopnea.  No palpitations but has some dizziness.  No syncope or swelling of the feet.  Patient is taking all the medicines regularly.  Patient does not smoke.  The following portions of the patient's history were reviewed and updated as appropriate: allergies, current medications, past family history, past medical history, past social history, past surgical history, and problem list.  Past Medical History:   Diagnosis Date    CAD (coronary artery disease) 5/8/2024    Diabetes mellitus     Heart murmur     Heart valve disease     Hypertension     Postoperative atrial fibrillation 5/21/2024     Past Surgical History:   Procedure Laterality Date    CARDIAC CATHETERIZATION N/A 5/8/2024    Procedure: Left Heart Cath with angiogram;  Surgeon: Lenny Gilman MD;  Location: [facility] CATH INVASIVE LOCATION;  Service: Cardiovascular;  Laterality: N/A;    CARDIAC CATHETERIZATION N/A 5/8/2024    Procedure: Left ventriculography;  Surgeon: Lenny Gilman MD;  Location: [facility] CATH INVASIVE LOCATION;  Service: Cardiovascular;  Laterality: N/A;    CORONARY ARTERY BYPASS GRAFT N/A 5/17/2024    Procedure: CORONARY ARTERY BYPASS WITH INTERNAL MAMMARY ARTERY GRAFT with intraop LEO;  Surgeon: Sean Grier MD;  Location: [facility] CVOR;  Service: Cardiothoracic;  Laterality: N/A;  CABG X 1 (1 LIMA graft)    GALLBLADDER SURGERY  2002    MITRAL VALVE  "REPAIR/REPLACEMENT N/A 5/17/2024    Procedure: MITRAL VALVE REPAIR/REPLACEMENT;  Surgeon: Sean Grier MD;  Location: [facility];  Service: Cardiothoracic;  Laterality: N/A;  Mitral Valve repair with 36mm SimuPlus Band. with left atrial appendage closure.     /73   Pulse 72   Ht 182.9 cm (72.01\")   Wt 79.8 kg (176 lb)   SpO2 97%   BMI 23.86 kg/m²   Family History   Problem Relation Age of Onset    No Known Problems Mother     Heart attack Father     No Known Problems Sister     No Known Problems Brother     No Known Problems Maternal Aunt     No Known Problems Maternal Uncle     No Known Problems Paternal Aunt     No Known Problems Paternal Uncle     No Known Problems Maternal Grandmother     No Known Problems Maternal Grandfather     No Known Problems Paternal Grandmother     No Known Problems Paternal Grandfather     No Known Problems Other     Anemia Neg Hx     Arrhythmia Neg Hx     Asthma Neg Hx     Clotting disorder Neg Hx     Fainting Neg Hx     Heart disease Neg Hx     Heart failure Neg Hx     Hyperlipidemia Neg Hx     Hypertension Neg Hx        Current Outpatient Medications:     amiodarone (PACERONE) 200 MG tablet, Take 1 tablet by mouth Daily., Disp: 90 tablet, Rfl: 3    aspirin 81 MG EC tablet, Take 1 tablet by mouth Daily., Disp: , Rfl:     B Complex-Biotin-FA (SUPER B-COMPLEX) capsule, Take 1 capsule by mouth Daily., Disp: , Rfl:     Coenzyme Q10 (COQ-10) 200 MG capsule, Take 1 tablet by mouth Daily., Disp: , Rfl:     Flaxseed, Linseed, (FLAXSEED OIL) oil, Take 1 capsule by mouth Daily., Disp: , Rfl:     metFORMIN (GLUCOPHAGE) 500 MG tablet, Take 1 tablet by mouth 2 (Two) Times a Day With Meals., Disp: , Rfl:     Multiple Vitamins-Minerals (EQ COMPLETE MULTIVITAMIN-ADULT) tablet, Take 1 tablet by mouth Daily., Disp: , Rfl:     tamsulosin (FLOMAX) 0.4 MG capsule 24 hr capsule, Take 1 capsule by mouth 2 (Two) Times a Day., Disp: , Rfl:     vitamin C (ASCORBIC ACID) 500 MG tablet, Take 1 " tablet by mouth Daily., Disp: , Rfl:   No Known Allergies  Social History     Socioeconomic History    Marital status:    Tobacco Use    Smoking status: Never     Passive exposure: Never    Smokeless tobacco: Never   Vaping Use    Vaping status: Never Used   Substance and Sexual Activity    Alcohol use: Yes     Alcohol/week: 2.0 standard drinks of alcohol     Types: 1 Glasses of wine, 1 Cans of beer per week     Comment: occ     Drug use: Never    Sexual activity: Not Currently     Partners: Female     Review of Systems   Constitutional: Positive for malaise/fatigue.   Cardiovascular:  Negative for chest pain, dyspnea on exertion, leg swelling and palpitations.   Respiratory:  Negative for cough and shortness of breath.    Gastrointestinal:  Negative for abdominal pain, nausea and vomiting.   Neurological:  Positive for dizziness and light-headedness. Negative for focal weakness, headaches and numbness.   All other systems reviewed and are negative.             Objective:     Constitutional:       Appearance: Well-developed.   Eyes:      General: No scleral icterus.     Conjunctiva/sclera: Conjunctivae normal.   HENT:      Head: Normocephalic and atraumatic.   Neck:      Vascular: No carotid bruit or JVD.   Pulmonary:      Effort: Pulmonary effort is normal.      Breath sounds: Normal breath sounds. No wheezing. No rales.   Cardiovascular:      Normal rate. Regular rhythm.   Pulses:     Intact distal pulses.   Abdominal:      General: Bowel sounds are normal.      Palpations: Abdomen is soft.   Musculoskeletal:      Cervical back: Normal range of motion and neck supple. Skin:     General: Skin is warm and dry.      Findings: No rash.   Neurological:      Mental Status: Alert.       Procedures    Lab Review:         MDM    #1 coronary disease  Patient had severe single-vessel coronary disease and is status post coronary bypass surgery x 1 vessel with a SVG to the diagonal branch and is currently stable on  medications with normal V function.  2.  Mitral valve disease  Patient has severe mitral gestation status post mitral valve repair and stable on medical therapy  3.  Hypertension  Patient blood pressure actually the lower side and hence advised to check his blood pressure at home and will adjust medicines accordingly  4.  Hyperlipidemia  Patient on atorvastatin but is not taking that and is taking flaxseed oil now.  5.  Diabetes  Pain is on oral medicines and followed by the primary care doctor.    Patient's previous medical records, labs, and EKG were reviewed and discussed with the patient at today's visit.

## 2025-03-24 DIAGNOSIS — G47.33 OBSTRUCTIVE SLEEP APNEA: Primary | ICD-10-CM

## 2025-03-24 DIAGNOSIS — G47.8 NON-RESTORATIVE SLEEP: ICD-10-CM

## 2025-03-24 DIAGNOSIS — G47.10 HYPERSOMNIA: ICD-10-CM

## 2025-04-15 ENCOUNTER — HOSPITAL ENCOUNTER (OUTPATIENT)
Dept: SLEEP MEDICINE | Facility: HOSPITAL | Age: 85
Discharge: HOME | End: 2025-04-15
Admitting: NURSE PRACTITIONER
Payer: MEDICARE

## 2025-04-15 VITALS — WEIGHT: 175.93 LBS | HEIGHT: 72 IN | BODY MASS INDEX: 23.83 KG/M2

## 2025-04-15 DIAGNOSIS — G47.10 HYPERSOMNIA: ICD-10-CM

## 2025-04-15 DIAGNOSIS — G47.33 OBSTRUCTIVE SLEEP APNEA: ICD-10-CM

## 2025-04-15 DIAGNOSIS — G47.8 NON-RESTORATIVE SLEEP: ICD-10-CM

## 2025-04-15 PROCEDURE — 95811 POLYSOM 6/>YRS CPAP 4/> PARM: CPT

## 2025-04-28 DIAGNOSIS — G47.33 OBSTRUCTIVE SLEEP APNEA: Primary | ICD-10-CM

## 2025-04-28 DIAGNOSIS — G47.8 NON-RESTORATIVE SLEEP: ICD-10-CM

## 2025-04-28 DIAGNOSIS — G47.10 HYPERSOMNIA: ICD-10-CM

## 2025-06-24 ENCOUNTER — OFFICE VISIT (OUTPATIENT)
Dept: SLEEP MEDICINE | Facility: CLINIC | Age: 85
End: 2025-06-24
Payer: MEDICARE

## 2025-06-24 VITALS
HEART RATE: 65 BPM | SYSTOLIC BLOOD PRESSURE: 122 MMHG | DIASTOLIC BLOOD PRESSURE: 74 MMHG | BODY MASS INDEX: 23.75 KG/M2 | WEIGHT: 179.2 LBS | OXYGEN SATURATION: 96 % | HEIGHT: 73 IN

## 2025-06-24 DIAGNOSIS — G47.33 OSA (OBSTRUCTIVE SLEEP APNEA): Primary | ICD-10-CM

## 2025-06-24 DIAGNOSIS — Z95.1 S/P CABG X 1: ICD-10-CM

## 2025-06-24 DIAGNOSIS — Z98.890 S/P MVR (MITRAL VALVE REPAIR): ICD-10-CM

## 2025-06-24 DIAGNOSIS — I10 ESSENTIAL HYPERTENSION: ICD-10-CM

## 2025-06-24 PROCEDURE — 3074F SYST BP LT 130 MM HG: CPT | Performed by: INTERNAL MEDICINE

## 2025-06-24 PROCEDURE — 1159F MED LIST DOCD IN RCRD: CPT | Performed by: INTERNAL MEDICINE

## 2025-06-24 PROCEDURE — G0463 HOSPITAL OUTPT CLINIC VISIT: HCPCS

## 2025-06-24 PROCEDURE — 3078F DIAST BP <80 MM HG: CPT | Performed by: INTERNAL MEDICINE

## 2025-06-24 PROCEDURE — 1160F RVW MEDS BY RX/DR IN RCRD: CPT | Performed by: INTERNAL MEDICINE

## 2025-06-24 PROCEDURE — 99204 OFFICE O/P NEW MOD 45 MIN: CPT | Performed by: INTERNAL MEDICINE

## 2025-06-24 NOTE — PROGRESS NOTES
[facility]  Sleep Medicine  [address], [address]  [place] [address]  Phone   Fax       Maurice Alvarado  [id number]   1940  84 y.o.  male      Date of service: 6/24/2025    Chief Complaint   Patient presents with    Unable To Fall Asleep    Unable To Stay Asleep    Frequent Awakenings    Sleep Apnea    Snoring       History of present illness;  Maurice Alvarado 84 y.o.  is a new patient for me and was seen today for management of obstructive sleep apnea.  Patient had a home sleep test which showed moderate sleep apnea with AHI of 28.3 per hour.  This was done through [facility].  Then he had a titration here at [facility].  Patient needs a CPAP of 11 cm.  Unfortunately he was not set up for the CPAP and the report was sent to DAKOTAH Vences in [place] as the order was done by her.  However on detailed patient reports that he had a virtual visit and was never followed up.  Now the patient is in the clinic to discuss treatment options and to get the CPAP.    Patient gives the following sleep history.  Sleep schedule:  Bedtime: 11 PM  Wake time: 8 AM        Past medical history: (Relevant to sleep medicine)  Hypertension  Diabetes mellitus  Coronary artery disease status post CABG  Mitral valve repair    Medications are reviewed by me and documented in the encounter  Allergies reviewed and documented in encounter    Social history:  Do you drive a commercial vehicle:  No   Shift work:  No   Tobacco use:  No   Alcohol use:  0 per week  Caffeinated drinks: 1    FAMILY HISTORY (Your mother, father, brothers and sisters) (relevant to sleep medicine)  No family history of sleep apnea    REVIEW OF SYSTEMS.  Full review of systems available on the intake form which is scanned in the media tab.  The relevant positive are noted below  Daytime excessive sleepiness with [place] Sleepiness Scale :  Snoring      Physical exam:  Vitals:    06/24/25 1300  "  BP: 122/74   BP Location: Left arm   Patient Position: Sitting   Pulse: 65   SpO2: 96%   Weight: 81.3 kg (179 lb 3.2 oz)   Height: 185.4 cm (73\")    Body mass index is 23.64 kg/m². Neck Circumference: 13 inches  Nose: no nasal septal defects or deviation and the nasal passages are clear, no nasal polyps,  Throat: tonsils are not enlarged, tongue normal, oral airway Mallampati class 3  NECK:Neck Circumference: 13 inches, trachea is in the midline, thyroid not enlarged  RESPIRATORY SYSTEM: Breath sounds are equal on both sides, there are no wheezes   CARDIOVASULAR SYSTEM: Heart sounds are regular rhythm and mk rate, no edema  EXTREMITES: No cyanosis, clubbing  NEUROLOGICAL SYSTEM: Oriented x 3, no gross motor defects, gait normal    BMI is within normal parameters. No other follow-up for BMI required.      Assessment and plan:  Obstructive sleep apnea ( G 47.33).  Patient has moderate sleep apnea with AHI of 28.3/h and the titration showed that he needs a CPAP of 11 cm.  He wants to get the CPAP from [facility] and I am going to order a CPAP with [employer].  Patient will follow-up with me for compliance in 31 to 90 days.  I have also discussed consequences of untreated sleep apnea.  Snoring (R06.83) snoring is the sound created by turbulent airflow vibrating upper airway soft tissue.  I have also discussed factors affecting snoring including sleep deprivation, sleeping on the back and alcohol ingestion. To minimize snoring, patient is advised to have adequate sleep, sleep on the side and avoid alcohol and sedative medications before bedtime  Daytime excessive sleepiness .  It was assessed with [place] Sleepiness Scale of  .  There are many causes for daytime excessive sleepiness including sleep depression, shiftwork syndrome, depression and other medical disorders including heart, kidney and liver failure.  The most serious cause of excessive sleepiness is due to neurological conditions like " narcolepsy/cataplexy.  But the most common cause of excessive sleepiness is due to sleep apnea with frequent awakenings during sleep time.  I have discussed safety of driving and to remain vigilant while driving.  Hypertension  Diabetes mellitus  Coronary disease status post CABG  Mitral valve repair,   Return for 31 to 90 days after PAP setup with down load..  Patient's questions were answered.      6/24/2025  Joe Brito MD  Sleep Medicine  Medical Director  [facility]: [facility] sleep [facility]

## 2025-07-11 RX ORDER — AMIODARONE HYDROCHLORIDE 200 MG/1
200 TABLET ORAL
Qty: 90 TABLET | Refills: 2 | Status: SHIPPED | OUTPATIENT
Start: 2025-07-11

## 2025-07-11 NOTE — TELEPHONE ENCOUNTER
Rx Refill Note  Requested Prescriptions     Pending Prescriptions Disp Refills    amiodarone (PACERONE) 200 MG tablet [Pharmacy Med Name: AMIODARONE  MG TABLET] 90 tablet 3     Sig: TAKE 1 TABLET BY MOUTH DAILY      Last office visit with prescribing clinician: 12/17/2024   Last telemedicine visit with prescribing clinician: Visit date not found   Next office visit with prescribing clinician: 9/15/2025                         Would you like a call back once the refill request has been completed: [] Yes [] No    If the office needs to give you a call back, can they leave a voicemail: [] Yes [] No    Nancy Johnson MA  07/11/25, 10:06 EDT

## (undated) DEVICE — PK TRY HEART CATH 50

## (undated) DEVICE — CATH DIAG IMPULSE PIG .056 6F 110CM

## (undated) DEVICE — DGW .035 MC J3MM 150CM T H AMP: Brand: EMERALD

## (undated) DEVICE — ELECTRD DEFIB M/FUNC PROPADZ RADIOL 2PK

## (undated) DEVICE — CATH DIAG IMPULSE FL4 6F 100CM

## (undated) DEVICE — CATH DIAG IMPULSE FR4 6F 100CM

## (undated) DEVICE — PINNACLE INTRODUCER SHEATH: Brand: PINNACLE